# Patient Record
Sex: MALE | Race: BLACK OR AFRICAN AMERICAN | NOT HISPANIC OR LATINO | Employment: FULL TIME | URBAN - METROPOLITAN AREA
[De-identification: names, ages, dates, MRNs, and addresses within clinical notes are randomized per-mention and may not be internally consistent; named-entity substitution may affect disease eponyms.]

---

## 2019-12-30 ENCOUNTER — LAB REQUISITION (OUTPATIENT)
Dept: LAB | Facility: OTHER | Age: 39
End: 2019-12-30

## 2019-12-30 DIAGNOSIS — Z11.1 TUBERCULOSIS SCREENING: ICD-10-CM

## 2019-12-30 PROCEDURE — 86481 TB AG RESPONSE T-CELL SUSP: CPT | Performed by: EMERGENCY MEDICINE

## 2020-01-01 LAB
TSPOT INTERPRETATION: NEGATIVE
TSPOT NIL CONTROL INTERPRETATION: NORMAL
TSPOT PANEL A: 0
TSPOT PANEL B: 0
TSPOT POS CONTROL INTERPRETATION: NORMAL

## 2021-12-07 ENCOUNTER — APPOINTMENT (OUTPATIENT)
Dept: ULTRASOUND IMAGING | Facility: HOSPITAL | Age: 41
End: 2021-12-07

## 2021-12-07 ENCOUNTER — APPOINTMENT (OUTPATIENT)
Dept: GENERAL RADIOLOGY | Facility: HOSPITAL | Age: 41
End: 2021-12-07

## 2021-12-07 ENCOUNTER — HOSPITAL ENCOUNTER (INPATIENT)
Facility: HOSPITAL | Age: 41
LOS: 6 days | Discharge: HOME | End: 2021-12-13
Attending: EMERGENCY MEDICINE | Admitting: HOSPITALIST

## 2021-12-07 DIAGNOSIS — R74.8 ELEVATED LIVER ENZYMES: ICD-10-CM

## 2021-12-07 DIAGNOSIS — E66.2 OBESITY HYPOVENTILATION SYNDROME (HCC): ICD-10-CM

## 2021-12-07 DIAGNOSIS — R29.818 SUSPECTED SLEEP APNEA: ICD-10-CM

## 2021-12-07 DIAGNOSIS — R33.9 URINARY RETENTION: ICD-10-CM

## 2021-12-07 DIAGNOSIS — R60.1 ANASARCA: Primary | ICD-10-CM

## 2021-12-07 PROBLEM — E66.9 OBESITY (BMI 30-39.9): Status: ACTIVE | Noted: 2021-12-07

## 2021-12-07 PROBLEM — I16.0 HYPERTENSIVE URGENCY: Status: ACTIVE | Noted: 2021-12-07

## 2021-12-07 PROBLEM — F17.200 TOBACCO USE DISORDER: Status: ACTIVE | Noted: 2021-12-07

## 2021-12-07 PROBLEM — R33.8 ACUTE URINARY RETENTION: Status: ACTIVE | Noted: 2021-12-07

## 2021-12-07 PROBLEM — R03.0 ELEVATED BLOOD PRESSURE READING: Status: ACTIVE | Noted: 2021-12-07

## 2021-12-07 LAB
ALBUMIN SERPL-MCNC: 3.7 G/DL (ref 3.5–5.2)
ALBUMIN/GLOB SERPL: 0.8 G/DL
ALP SERPL-CCNC: 91 U/L (ref 39–117)
ALT SERPL W P-5'-P-CCNC: 60 U/L (ref 1–41)
ANION GAP SERPL CALCULATED.3IONS-SCNC: 9.3 MMOL/L (ref 5–15)
AST SERPL-CCNC: 60 U/L (ref 1–40)
BACTERIA UR QL AUTO: ABNORMAL /HPF
BASOPHILS # BLD AUTO: 0.04 10*3/MM3 (ref 0–0.2)
BASOPHILS NFR BLD AUTO: 0.6 % (ref 0–1.5)
BILIRUB SERPL-MCNC: 0.6 MG/DL (ref 0–1.2)
BILIRUB UR QL STRIP: NEGATIVE
BUN SERPL-MCNC: 14 MG/DL (ref 6–20)
BUN/CREAT SERPL: 13.9 (ref 7–25)
CALCIUM SPEC-SCNC: 9.1 MG/DL (ref 8.6–10.5)
CHLORIDE SERPL-SCNC: 98 MMOL/L (ref 98–107)
CLARITY UR: CLEAR
CO2 SERPL-SCNC: 30.7 MMOL/L (ref 22–29)
COLOR UR: YELLOW
CREAT SERPL-MCNC: 1.01 MG/DL (ref 0.76–1.27)
DEPRECATED RDW RBC AUTO: 46.7 FL (ref 37–54)
EOSINOPHIL # BLD AUTO: 0.11 10*3/MM3 (ref 0–0.4)
EOSINOPHIL NFR BLD AUTO: 1.7 % (ref 0.3–6.2)
ERYTHROCYTE [DISTWIDTH] IN BLOOD BY AUTOMATED COUNT: 17.7 % (ref 12.3–15.4)
GFR SERPL CREATININE-BSD FRML MDRD: 99 ML/MIN/1.73
GLOBULIN UR ELPH-MCNC: 4.9 GM/DL
GLUCOSE SERPL-MCNC: 86 MG/DL (ref 65–99)
GLUCOSE UR STRIP-MCNC: NEGATIVE MG/DL
HCT VFR BLD AUTO: 49.3 % (ref 37.5–51)
HGB BLD-MCNC: 14.9 G/DL (ref 13–17.7)
HGB UR QL STRIP.AUTO: NEGATIVE
HOLD SPECIMEN: NORMAL
HOLD SPECIMEN: NORMAL
HYALINE CASTS UR QL AUTO: ABNORMAL /LPF
IMM GRANULOCYTES # BLD AUTO: 0.01 10*3/MM3 (ref 0–0.05)
IMM GRANULOCYTES NFR BLD AUTO: 0.2 % (ref 0–0.5)
KETONES UR QL STRIP: NEGATIVE
LEUKOCYTE ESTERASE UR QL STRIP.AUTO: ABNORMAL
LYMPHOCYTES # BLD AUTO: 1.23 10*3/MM3 (ref 0.7–3.1)
LYMPHOCYTES NFR BLD AUTO: 19.1 % (ref 19.6–45.3)
MAGNESIUM SERPL-MCNC: 2.2 MG/DL (ref 1.6–2.6)
MCH RBC QN AUTO: 24.3 PG (ref 26.6–33)
MCHC RBC AUTO-ENTMCNC: 30.2 G/DL (ref 31.5–35.7)
MCV RBC AUTO: 80.3 FL (ref 79–97)
MONOCYTES # BLD AUTO: 0.57 10*3/MM3 (ref 0.1–0.9)
MONOCYTES NFR BLD AUTO: 8.8 % (ref 5–12)
NEUTROPHILS NFR BLD AUTO: 4.49 10*3/MM3 (ref 1.7–7)
NEUTROPHILS NFR BLD AUTO: 69.6 % (ref 42.7–76)
NITRITE UR QL STRIP: NEGATIVE
NRBC BLD AUTO-RTO: 0.8 /100 WBC (ref 0–0.2)
NT-PROBNP SERPL-MCNC: 1374 PG/ML (ref 0–450)
PH UR STRIP.AUTO: 5.5 [PH] (ref 5–8)
PLATELET # BLD AUTO: 281 10*3/MM3 (ref 140–450)
PMV BLD AUTO: 9.8 FL (ref 6–12)
POTASSIUM SERPL-SCNC: 4.6 MMOL/L (ref 3.5–5.2)
PROT SERPL-MCNC: 8.6 G/DL (ref 6–8.5)
PROT UR QL STRIP: ABNORMAL
QT INTERVAL: 353 MS
RBC # BLD AUTO: 6.14 10*6/MM3 (ref 4.14–5.8)
RBC # UR STRIP: ABNORMAL /HPF
REF LAB TEST METHOD: ABNORMAL
SARS-COV-2 ORF1AB RESP QL NAA+PROBE: NOT DETECTED
SODIUM SERPL-SCNC: 138 MMOL/L (ref 136–145)
SP GR UR STRIP: 1.02 (ref 1–1.03)
SQUAMOUS #/AREA URNS HPF: ABNORMAL /HPF
TROPONIN T SERPL-MCNC: 0.01 NG/ML (ref 0–0.03)
UROBILINOGEN UR QL STRIP: ABNORMAL
WBC # UR STRIP: ABNORMAL /HPF
WBC NRBC COR # BLD: 6.45 10*3/MM3 (ref 3.4–10.8)
WHOLE BLOOD HOLD SPECIMEN: NORMAL
WHOLE BLOOD HOLD SPECIMEN: NORMAL

## 2021-12-07 PROCEDURE — 80074 ACUTE HEPATITIS PANEL: CPT | Performed by: STUDENT IN AN ORGANIZED HEALTH CARE EDUCATION/TRAINING PROGRAM

## 2021-12-07 PROCEDURE — 83036 HEMOGLOBIN GLYCOSYLATED A1C: CPT | Performed by: STUDENT IN AN ORGANIZED HEALTH CARE EDUCATION/TRAINING PROGRAM

## 2021-12-07 PROCEDURE — P9612 CATHETERIZE FOR URINE SPEC: HCPCS

## 2021-12-07 PROCEDURE — 80053 COMPREHEN METABOLIC PANEL: CPT | Performed by: EMERGENCY MEDICINE

## 2021-12-07 PROCEDURE — 84484 ASSAY OF TROPONIN QUANT: CPT | Performed by: NURSE PRACTITIONER

## 2021-12-07 PROCEDURE — 25010000002 FUROSEMIDE PER 20 MG: Performed by: NURSE PRACTITIONER

## 2021-12-07 PROCEDURE — 83735 ASSAY OF MAGNESIUM: CPT | Performed by: NURSE PRACTITIONER

## 2021-12-07 PROCEDURE — 84443 ASSAY THYROID STIM HORMONE: CPT | Performed by: STUDENT IN AN ORGANIZED HEALTH CARE EDUCATION/TRAINING PROGRAM

## 2021-12-07 PROCEDURE — U0004 COV-19 TEST NON-CDC HGH THRU: HCPCS | Performed by: NURSE PRACTITIONER

## 2021-12-07 PROCEDURE — 83880 ASSAY OF NATRIURETIC PEPTIDE: CPT | Performed by: NURSE PRACTITIONER

## 2021-12-07 PROCEDURE — 93010 ELECTROCARDIOGRAM REPORT: CPT | Performed by: INTERNAL MEDICINE

## 2021-12-07 PROCEDURE — 85025 COMPLETE CBC W/AUTO DIFF WBC: CPT | Performed by: EMERGENCY MEDICINE

## 2021-12-07 PROCEDURE — 81001 URINALYSIS AUTO W/SCOPE: CPT | Performed by: NURSE PRACTITIONER

## 2021-12-07 PROCEDURE — 51798 US URINE CAPACITY MEASURE: CPT

## 2021-12-07 PROCEDURE — 93005 ELECTROCARDIOGRAM TRACING: CPT | Performed by: NURSE PRACTITIONER

## 2021-12-07 PROCEDURE — 71045 X-RAY EXAM CHEST 1 VIEW: CPT

## 2021-12-07 PROCEDURE — 80061 LIPID PANEL: CPT | Performed by: STUDENT IN AN ORGANIZED HEALTH CARE EDUCATION/TRAINING PROGRAM

## 2021-12-07 PROCEDURE — 99285 EMERGENCY DEPT VISIT HI MDM: CPT

## 2021-12-07 RX ORDER — ACETAMINOPHEN 325 MG/1
650 TABLET ORAL EVERY 4 HOURS PRN
Status: DISCONTINUED | OUTPATIENT
Start: 2021-12-07 | End: 2021-12-13 | Stop reason: HOSPADM

## 2021-12-07 RX ORDER — SODIUM CHLORIDE 0.9 % (FLUSH) 0.9 %
10 SYRINGE (ML) INJECTION EVERY 12 HOURS SCHEDULED
Status: DISCONTINUED | OUTPATIENT
Start: 2021-12-07 | End: 2021-12-08

## 2021-12-07 RX ORDER — ONDANSETRON 2 MG/ML
4 INJECTION INTRAMUSCULAR; INTRAVENOUS EVERY 6 HOURS PRN
Status: DISCONTINUED | OUTPATIENT
Start: 2021-12-07 | End: 2021-12-13 | Stop reason: HOSPADM

## 2021-12-07 RX ORDER — CHOLECALCIFEROL (VITAMIN D3) 125 MCG
5 CAPSULE ORAL NIGHTLY PRN
Status: DISCONTINUED | OUTPATIENT
Start: 2021-12-07 | End: 2021-12-13 | Stop reason: HOSPADM

## 2021-12-07 RX ORDER — SODIUM CHLORIDE 0.9 % (FLUSH) 0.9 %
10 SYRINGE (ML) INJECTION AS NEEDED
Status: DISCONTINUED | OUTPATIENT
Start: 2021-12-07 | End: 2021-12-08

## 2021-12-07 RX ORDER — NITROGLYCERIN 0.4 MG/1
0.4 TABLET SUBLINGUAL
Status: DISCONTINUED | OUTPATIENT
Start: 2021-12-07 | End: 2021-12-13 | Stop reason: HOSPADM

## 2021-12-07 RX ORDER — ACETAMINOPHEN 650 MG/1
650 SUPPOSITORY RECTAL EVERY 4 HOURS PRN
Status: DISCONTINUED | OUTPATIENT
Start: 2021-12-07 | End: 2021-12-10

## 2021-12-07 RX ORDER — SODIUM CHLORIDE 0.9 % (FLUSH) 0.9 %
10 SYRINGE (ML) INJECTION AS NEEDED
Status: DISCONTINUED | OUTPATIENT
Start: 2021-12-07 | End: 2021-12-10

## 2021-12-07 RX ORDER — ACETAMINOPHEN 160 MG/5ML
650 SOLUTION ORAL EVERY 4 HOURS PRN
Status: DISCONTINUED | OUTPATIENT
Start: 2021-12-07 | End: 2021-12-10

## 2021-12-07 RX ORDER — FUROSEMIDE 10 MG/ML
80 INJECTION INTRAMUSCULAR; INTRAVENOUS ONCE
Status: COMPLETED | OUTPATIENT
Start: 2021-12-07 | End: 2021-12-07

## 2021-12-07 RX ADMIN — FUROSEMIDE 80 MG: 10 INJECTION, SOLUTION INTRAMUSCULAR; INTRAVENOUS at 20:31

## 2021-12-07 NOTE — ED NOTES
Pt presents to ED with complaints of groin swelling x2 days, and decreased urination. Pt went to [facility] and they told him to come to the ER for additional scans. Pt is A&Ox4, able to ambulate, and in a mask at this time.     Patient was placed in face mask during first look triage.  Patient was wearing a face mask throughout encounter.  I wore personal protective equipment throughout the encounter.  Hand hygiene was performed before and after patient encounter.       Randi Díaz, RN  12/07/21 0666

## 2021-12-07 NOTE — ED PROVIDER NOTES
EMERGENCY DEPARTMENT ENCOUNTER    Room Number:  07/07  Date of encounter:  12/7/2021  PCP: Provider, No Known  Historian: Patient      PPE    Patient was placed in face mask in first look. Patient was wearing facemask when I entered the room and throughout our encounter. I wore full protective equipment throughout this patient encounter including a CAPR face mask, and gloves. Hand hygiene was performed before donning protective equipment and after removal when leaving the room.        HPI:  Chief Complaint: Edema  A complete HPI/ROS/PMH/PSH/SH/FH are unobtainable due to: Nothing    Context: Richard Rodrigez is a 41 y.o. male who arrives to the ED via private vehicle.  Patient presents with c/o moderate, constant, swelling to his abdomen, bilateral groin, penis and bilateral lower extremities for the past 2 days.   Patient also complains of decreased urine output also for the past 2 days.  Patient states that it is difficult for him to urinate because he is so swollen.  Patient denies fever, chills, chest pain, shortness of breath, nausea, vomiting, dizziness or any other symptoms.  Patient states that nothing makes the symptoms better and nothing worsens symptoms.  Patient states that he has no past medical history and takes no medications.  He states he did get his first COVID-19 vaccine 2 to 3 days ago.  He states he does drink at least 3 times a week.        PAST MEDICAL HISTORY  Active Ambulatory Problems     Diagnosis Date Noted   • No Active Ambulatory Problems     Resolved Ambulatory Problems     Diagnosis Date Noted   • No Resolved Ambulatory Problems     No Additional Past Medical History         PAST SURGICAL HISTORY  History reviewed. No pertinent surgical history.      FAMILY HISTORY  History reviewed. No pertinent family history.      SOCIAL HISTORY  Social History     Socioeconomic History   • Marital status:    Tobacco Use   • Smoking status: Current Every Day Smoker   Substance and Sexual  Activity   • Alcohol use: Yes     Alcohol/week: 2.0 standard drinks     Types: 2 Shots of liquor per week   • Drug use: Never   • Sexual activity: Defer         ALLERGIES  Patient has no known allergies.        REVIEW OF SYSTEMS  Review of Systems     All systems reviewed and negative except for those discussed in HPI.        PHYSICAL EXAM    ED Triage Vitals [12/07/21 1520]   Temp Heart Rate Resp BP SpO2   97.8 °F (36.6 °C) 117 18 -- 96 %      Temp src Heart Rate Source Patient Position BP Location FiO2 (%)   Tympanic Monitor -- -- --       Physical Exam  GENERAL: Patient appears older than stated age, nontoxic appearing, not distressed  HENT: normocephalic, atraumatic  EYES: no scleral icterus, PERRL  CV: regular rhythm, tachycardic, no murmur  RESPIRATORY: normal effort, diminished  ABDOMEN: Large, swollen abdomen with erythema, no tenderness to palpation, normal bowel sounds  Patient has moderate swelling to bilateral groin, penis and scrotum..  Cathy RN was at bedside during exam.  MUSCULOSKELETAL:Patient has 2+ pitting edema to bilateral lower extremities  NEURO: alert, moves all extremities, follows commands, mental status normal/baseline  SKIN: warm, dry, no rash   Psych: Appropriate mood and affect  Nursing notes and vital signs reviewed      LAB RESULTS  Recent Results (from the past 24 hour(s))   Comprehensive Metabolic Panel    Collection Time: 12/07/21  5:49 PM    Specimen: Blood   Result Value Ref Range    Glucose 86 65 - 99 mg/dL    BUN 14 6 - 20 mg/dL    Creatinine 1.01 0.76 - 1.27 mg/dL    Sodium 138 136 - 145 mmol/L    Potassium 4.6 3.5 - 5.2 mmol/L    Chloride 98 98 - 107 mmol/L    CO2 30.7 (H) 22.0 - 29.0 mmol/L    Calcium 9.1 8.6 - 10.5 mg/dL    Total Protein 8.6 (H) 6.0 - 8.5 g/dL    Albumin 3.70 3.50 - 5.20 g/dL    ALT (SGPT) 60 (H) 1 - 41 U/L    AST (SGOT) 60 (H) 1 - 40 U/L    Alkaline Phosphatase 91 39 - 117 U/L    Total Bilirubin 0.6 0.0 - 1.2 mg/dL    eGFR  African Amer 99 >60 mL/min/1.73     Globulin 4.9 gm/dL    A/G Ratio 0.8 g/dL    BUN/Creatinine Ratio 13.9 7.0 - 25.0    Anion Gap 9.3 5.0 - 15.0 mmol/L   CBC Auto Differential    Collection Time: 12/07/21  5:49 PM    Specimen: Blood   Result Value Ref Range    WBC 6.45 3.40 - 10.80 10*3/mm3    RBC 6.14 (H) 4.14 - 5.80 10*6/mm3    Hemoglobin 14.9 13.0 - 17.7 g/dL    Hematocrit 49.3 37.5 - 51.0 %    MCV 80.3 79.0 - 97.0 fL    MCH 24.3 (L) 26.6 - 33.0 pg    MCHC 30.2 (L) 31.5 - 35.7 g/dL    RDW 17.7 (H) 12.3 - 15.4 %    RDW-SD 46.7 37.0 - 54.0 fl    MPV 9.8 6.0 - 12.0 fL    Platelets 281 140 - 450 10*3/mm3    Neutrophil % 69.6 42.7 - 76.0 %    Lymphocyte % 19.1 (L) 19.6 - 45.3 %    Monocyte % 8.8 5.0 - 12.0 %    Eosinophil % 1.7 0.3 - 6.2 %    Basophil % 0.6 0.0 - 1.5 %    Immature Grans % 0.2 0.0 - 0.5 %    Neutrophils, Absolute 4.49 1.70 - 7.00 10*3/mm3    Lymphocytes, Absolute 1.23 0.70 - 3.10 10*3/mm3    Monocytes, Absolute 0.57 0.10 - 0.90 10*3/mm3    Eosinophils, Absolute 0.11 0.00 - 0.40 10*3/mm3    Basophils, Absolute 0.04 0.00 - 0.20 10*3/mm3    Immature Grans, Absolute 0.01 0.00 - 0.05 10*3/mm3    nRBC 0.8 (H) 0.0 - 0.2 /100 WBC   Green Top (Gel)    Collection Time: 12/07/21  5:49 PM   Result Value Ref Range    Extra Tube Hold for add-ons.    Lavender Top    Collection Time: 12/07/21  5:49 PM   Result Value Ref Range    Extra Tube hold for add-on    Gold Top - SST    Collection Time: 12/07/21  5:49 PM   Result Value Ref Range    Extra Tube Hold for add-ons.    Light Blue Top    Collection Time: 12/07/21  5:49 PM   Result Value Ref Range    Extra Tube hold for add-on    BNP    Collection Time: 12/07/21  5:49 PM    Specimen: Blood   Result Value Ref Range    proBNP 1,374.0 (H) 0.0 - 450.0 pg/mL   Troponin    Collection Time: 12/07/21  5:49 PM    Specimen: Blood   Result Value Ref Range    Troponin T 0.011 0.000 - 0.030 ng/mL   Magnesium    Collection Time: 12/07/21  5:49 PM    Specimen: Blood   Result Value Ref Range    Magnesium 2.2 1.6 - 2.6  mg/dL   Urinalysis With Microscopic If Indicated (No Culture) - Urine, Catheter    Collection Time: 12/07/21  5:51 PM    Specimen: Urine, Catheter   Result Value Ref Range    Color, UA Yellow Yellow, Straw    Appearance, UA Clear Clear    pH, UA 5.5 5.0 - 8.0    Specific Gravity, UA 1.016 1.005 - 1.030    Glucose, UA Negative Negative    Ketones, UA Negative Negative    Bilirubin, UA Negative Negative    Blood, UA Negative Negative    Protein, UA Trace (A) Negative    Leuk Esterase, UA Small (1+) (A) Negative    Nitrite, UA Negative Negative    Urobilinogen, UA 1.0 E.U./dL 0.2 - 1.0 E.U./dL   Urinalysis, Microscopic Only - Urine, Catheter    Collection Time: 12/07/21  5:51 PM    Specimen: Urine, Catheter   Result Value Ref Range    RBC, UA 0-2 None Seen, 0-2 /HPF    WBC, UA 6-12 (A) None Seen, 0-2 /HPF    Bacteria, UA None Seen None Seen /HPF    Squamous Epithelial Cells, UA 0-2 None Seen, 0-2 /HPF    Hyaline Casts, UA 0-2 None Seen /LPF    Methodology Automated Microscopy    ECG 12 Lead    Collection Time: 12/07/21  5:54 PM   Result Value Ref Range    QT Interval 353 ms       Ordered the above labs and independently reviewed the results.      RADIOLOGY  XR Chest 1 View    Result Date: 12/7/2021  AP CHEST  HISTORY: Swelling to abdomen and lower extremity  COMPARISON: None  FINDINGS: Lung fields appear clear. Heart size probably normal for technique. No appreciable pneumothorax. No acute bony abnormality.      No acute findings  This report was finalized on 12/7/2021 6:41 PM by Dr. Bryan Houston M.D.        I ordered the above noted radiological studies and viewed the images on the PACS system.       EKG      Independently viewed by me and interpreted by Dr Flores         MEDICAL RECORD REVIEW  Medical records reviewed in [facility], patient was sent here urgent care center today.  Otherwise no relevant medical records to review in epic      PROCEDURES    Procedures        DIFFERENTIAL DIAGNOSIS  Differential diagnosis  include but are not limited to the following: CHF, anasarca, electrolyte abnormality, DIEGO,      PROGRESS, DATA ANALYSIS, CONSULTS, AND MEDICAL DECISION MAKING        ED Course as of 12/07/21 2051   Tue Dec 07, 2021   1742 Discussed with patient that we will obtain labs, chest x-ray and EKG to evaluate for causes of his diffuse swelling.  Discussed with patient that he is going to require admission to the hospital.  Patient verbalized understanding and is agreeable to this plan. [MS]   1746 Bladder scan showed 469 cc.  Order placed for brown catheter. [MS]   1746 RN placed brown catheter with return of 800 cc of urine. [MS]   1832 BP(!): 187/117 [MS]   2000 proBNP(!): 1,374.0 [MS]   2016 Consult Note    Discussed care with Dr Ramos  Reviewed patient's history, exam, results and need for admission secondary to anasarca and urinary retention  Dr. Ramos accepts the patient to be admitted to telemetry inpatient bed.     [MS]      ED Course User Index  [MS] Charo Medley APRN     ADMISSION    Discussed treatment plan and reason for admission with pt/family and admitting physician.  Pt/family voiced understanding of the plan for admission for further testing/treatment as needed.      DIAGNOSIS  Final diagnoses:   Anasarca   Urinary retention   Elevated liver enzymes           MEDICATIONS GIVEN IN ED    Medications   sodium chloride 0.9 % flush 10 mL (has no administration in time range)   furosemide (LASIX) injection 80 mg (80 mg Intravenous Given 12/7/21 2031)           COURSE & MEDICAL DECISION MAKING  Any/All labs and Any/All Imaging studies that were ordered were reviewed and are noted above.  Results were reviewed/discussed with the patient and they were also made aware of online access.    Pt also made aware that some labs, such as cultures, will not be resulted during ER visit and followup with PMD is necessary.        Charo Medley APRN  12/07/21 2051

## 2021-12-07 NOTE — ED PROVIDER NOTES
I have supervised the care provided by the midlevel provider.    We have discussed this patient's history, physical exam, and treatment plan.   I have reviewed the note and have personally examined the patient and agree with the plan of care.  See attached attending note.  My personal findings are below:    Patient complains of swelling of the legs, groin, scrotum, and abdomen for the past several days.  Also reports decreased urine output and difficulty urinating.  Denies fever, chills, chest pain, shortness of breath, vomiting, or diarrhea.  Patient denies any significant past medical history but has not seen a primary care doctor many years.    On exam: Awake and alert.  Heart is regular rate and rhythm.  Lungs are clear bilaterally.  Respiratory effort is normal.  Abdomen is obese, distended, and nontender.  There is edema of the scrotum and penis.  There is 2+ pitting edema in both lower extremities.    Patient was found to have urinary retention and a Tilley catheter was placed with return of 800 mL of urine.  Labs and chest x-ray are pending.      EKG          EKG time: 1754  Rhythm/Rate: Sinus tachycardia, rate 101  P waves and OH: Normal  QRS, axis: RAD  ST and T waves: Normal    Interpreted Contemporaneously by me, independently viewed  No prior available for comparison       César Flores MD  12/07/21 0188

## 2021-12-07 NOTE — ED NOTES
Attempt IV x2 no success, call to Macho Leija RN to come place iv with ultrasound machine     Alejandra Feliciano RN  12/07/21 8819

## 2021-12-08 ENCOUNTER — APPOINTMENT (OUTPATIENT)
Dept: ULTRASOUND IMAGING | Facility: HOSPITAL | Age: 41
End: 2021-12-08

## 2021-12-08 PROBLEM — R79.89 ELEVATED LFTS: Status: ACTIVE | Noted: 2021-12-08

## 2021-12-08 LAB
ANION GAP SERPL CALCULATED.3IONS-SCNC: 3.9 MMOL/L (ref 5–15)
BASOPHILS # BLD AUTO: 0.03 10*3/MM3 (ref 0–0.2)
BASOPHILS NFR BLD AUTO: 0.5 % (ref 0–1.5)
BUN SERPL-MCNC: 14 MG/DL (ref 6–20)
BUN/CREAT SERPL: 14.7 (ref 7–25)
CALCIUM SPEC-SCNC: 9.3 MG/DL (ref 8.6–10.5)
CHLORIDE SERPL-SCNC: 98 MMOL/L (ref 98–107)
CHOLEST SERPL-MCNC: 133 MG/DL (ref 0–200)
CO2 SERPL-SCNC: 34.1 MMOL/L (ref 22–29)
CREAT SERPL-MCNC: 0.95 MG/DL (ref 0.76–1.27)
CREAT UR-MCNC: 11.8 MG/DL
DEPRECATED RDW RBC AUTO: 48.2 FL (ref 37–54)
EOSINOPHIL # BLD AUTO: 0.06 10*3/MM3 (ref 0–0.4)
EOSINOPHIL NFR BLD AUTO: 0.9 % (ref 0.3–6.2)
ERYTHROCYTE [DISTWIDTH] IN BLOOD BY AUTOMATED COUNT: 18 % (ref 12.3–15.4)
GFR SERPL CREATININE-BSD FRML MDRD: 106 ML/MIN/1.73
GLUCOSE SERPL-MCNC: 148 MG/DL (ref 65–99)
HAV IGM SERPL QL IA: NORMAL
HAV IGM SERPL QL IA: NORMAL
HBA1C MFR BLD: 6.4 % (ref 4.8–5.6)
HBV CORE IGM SERPL QL IA: NORMAL
HBV CORE IGM SERPL QL IA: NORMAL
HBV SURFACE AG SERPL QL IA: NORMAL
HBV SURFACE AG SERPL QL IA: NORMAL
HCT VFR BLD AUTO: 50 % (ref 37.5–51)
HCV AB SER DONR QL: NORMAL
HCV AB SER DONR QL: NORMAL
HDLC SERPL-MCNC: 40 MG/DL (ref 40–60)
HGB BLD-MCNC: 14.7 G/DL (ref 13–17.7)
HIV1+2 AB SER QL: NORMAL
IMM GRANULOCYTES # BLD AUTO: 0.02 10*3/MM3 (ref 0–0.05)
IMM GRANULOCYTES NFR BLD AUTO: 0.3 % (ref 0–0.5)
LDLC SERPL CALC-MCNC: 77 MG/DL (ref 0–100)
LDLC/HDLC SERPL: 1.92 {RATIO}
LYMPHOCYTES # BLD AUTO: 0.71 10*3/MM3 (ref 0.7–3.1)
LYMPHOCYTES NFR BLD AUTO: 10.9 % (ref 19.6–45.3)
MAGNESIUM SERPL-MCNC: 2.2 MG/DL (ref 1.6–2.6)
MCH RBC QN AUTO: 24.1 PG (ref 26.6–33)
MCHC RBC AUTO-ENTMCNC: 29.4 G/DL (ref 31.5–35.7)
MCV RBC AUTO: 81.8 FL (ref 79–97)
MONOCYTES # BLD AUTO: 0.45 10*3/MM3 (ref 0.1–0.9)
MONOCYTES NFR BLD AUTO: 6.9 % (ref 5–12)
NEUTROPHILS NFR BLD AUTO: 5.27 10*3/MM3 (ref 1.7–7)
NEUTROPHILS NFR BLD AUTO: 80.5 % (ref 42.7–76)
NRBC BLD AUTO-RTO: 0.5 /100 WBC (ref 0–0.2)
PHOSPHATE SERPL-MCNC: 5.3 MG/DL (ref 2.5–4.5)
PLATELET # BLD AUTO: 292 10*3/MM3 (ref 140–450)
PMV BLD AUTO: 10.6 FL (ref 6–12)
POTASSIUM SERPL-SCNC: 4.6 MMOL/L (ref 3.5–5.2)
PROT ?TM UR-MCNC: <4 MG/DL
PROT/CREAT UR: NORMAL MG/G{CREAT}
RBC # BLD AUTO: 6.11 10*6/MM3 (ref 4.14–5.8)
SODIUM SERPL-SCNC: 136 MMOL/L (ref 136–145)
TRIGL SERPL-MCNC: 81 MG/DL (ref 0–150)
TSH SERPL DL<=0.05 MIU/L-ACNC: 3.04 UIU/ML (ref 0.27–4.2)
VLDLC SERPL-MCNC: 16 MG/DL (ref 5–40)
WBC NRBC COR # BLD: 6.54 10*3/MM3 (ref 3.4–10.8)

## 2021-12-08 PROCEDURE — 83735 ASSAY OF MAGNESIUM: CPT | Performed by: STUDENT IN AN ORGANIZED HEALTH CARE EDUCATION/TRAINING PROGRAM

## 2021-12-08 PROCEDURE — 84156 ASSAY OF PROTEIN URINE: CPT | Performed by: STUDENT IN AN ORGANIZED HEALTH CARE EDUCATION/TRAINING PROGRAM

## 2021-12-08 PROCEDURE — 80074 ACUTE HEPATITIS PANEL: CPT | Performed by: INTERNAL MEDICINE

## 2021-12-08 PROCEDURE — 25010000002 FUROSEMIDE PER 20 MG: Performed by: INTERNAL MEDICINE

## 2021-12-08 PROCEDURE — 82105 ALPHA-FETOPROTEIN SERUM: CPT | Performed by: HOSPITALIST

## 2021-12-08 PROCEDURE — 84100 ASSAY OF PHOSPHORUS: CPT | Performed by: STUDENT IN AN ORGANIZED HEALTH CARE EDUCATION/TRAINING PROGRAM

## 2021-12-08 PROCEDURE — 85025 COMPLETE CBC W/AUTO DIFF WBC: CPT | Performed by: STUDENT IN AN ORGANIZED HEALTH CARE EDUCATION/TRAINING PROGRAM

## 2021-12-08 PROCEDURE — 80048 BASIC METABOLIC PNL TOTAL CA: CPT | Performed by: STUDENT IN AN ORGANIZED HEALTH CARE EDUCATION/TRAINING PROGRAM

## 2021-12-08 PROCEDURE — 82378 CARCINOEMBRYONIC ANTIGEN: CPT | Performed by: HOSPITALIST

## 2021-12-08 PROCEDURE — 82570 ASSAY OF URINE CREATININE: CPT | Performed by: STUDENT IN AN ORGANIZED HEALTH CARE EDUCATION/TRAINING PROGRAM

## 2021-12-08 PROCEDURE — G0432 EIA HIV-1/HIV-2 SCREEN: HCPCS | Performed by: STUDENT IN AN ORGANIZED HEALTH CARE EDUCATION/TRAINING PROGRAM

## 2021-12-08 PROCEDURE — 25010000002 FUROSEMIDE PER 20 MG: Performed by: STUDENT IN AN ORGANIZED HEALTH CARE EDUCATION/TRAINING PROGRAM

## 2021-12-08 RX ORDER — FUROSEMIDE 10 MG/ML
40 INJECTION INTRAMUSCULAR; INTRAVENOUS
Status: DISCONTINUED | OUTPATIENT
Start: 2021-12-08 | End: 2021-12-08

## 2021-12-08 RX ORDER — FUROSEMIDE 10 MG/ML
60 INJECTION INTRAMUSCULAR; INTRAVENOUS EVERY 8 HOURS
Status: DISCONTINUED | OUTPATIENT
Start: 2021-12-08 | End: 2021-12-11

## 2021-12-08 RX ADMIN — SODIUM CHLORIDE, PRESERVATIVE FREE 10 ML: 5 INJECTION INTRAVENOUS at 00:43

## 2021-12-08 RX ADMIN — FUROSEMIDE 60 MG: 10 INJECTION, SOLUTION INTRAMUSCULAR; INTRAVENOUS at 16:49

## 2021-12-08 RX ADMIN — ACETAMINOPHEN 650 MG: 325 TABLET, FILM COATED ORAL at 21:54

## 2021-12-08 RX ADMIN — SODIUM CHLORIDE, PRESERVATIVE FREE 10 ML: 5 INJECTION INTRAVENOUS at 10:02

## 2021-12-08 RX ADMIN — FUROSEMIDE 40 MG: 10 INJECTION, SOLUTION INTRAMUSCULAR; INTRAVENOUS at 10:01

## 2021-12-08 NOTE — PLAN OF CARE
Problem: Adult Inpatient Plan of Care  Goal: Plan of Care Review  Flowsheets (Taken 12/8/2021 5825)  Progress: improving  Plan of Care Reviewed With: patient  Outcome Summary: 3 + edema to body feet to mid abdomen having difficulty moving around diuresed a great deal with lasix f/c paent to bsd urine clear yellow oxygen on at 4 liters desats when asleep to the 80's much like sleep apnea   Goal Outcome Evaluation:  Plan of Care Reviewed With: patient        Progress: improving  Outcome Summary: 3 + edema to body feet to mid abdomen having difficulty moving around diuresed a great deal with lasix f/c paent to bsd urine clear yellow oxygen on at 4 liters desats when asleep to the 80's much like sleep apnea

## 2021-12-08 NOTE — ED NOTES
Pt in mask throughout encounter. This ERT was in appropriate ppe.       Tito Lee, PCT  12/07/21 1932

## 2021-12-08 NOTE — PROGRESS NOTES
Name: Richard Rodrigez ADMIT: 2021   : 1980  PCP: Provider, No Known    MRN: [id number] LOS: 1 days   AGE/SEX: 41 y.o. male  ROOM: [facility]/     Subjective   Subjective     Patient is lying on the bed and is in no major distress.  Denies nausea, vomiting, chest pain.  Does admit to shortness of breath.  Review of Systems     Objective   Objective   Vital Signs  Temp:  [97.1 °F (36.2 °C)-97.9 °F (36.6 °C)] 97.6 °F (36.4 °C)  Heart Rate:  [] 105  Resp:  [14-20] 20  BP: (139-187)/() 179/98  SpO2:  [90 %-100 %] 90 %  on  Flow (L/min):  [4-8] 4;   Device (Oxygen Therapy): nasal cannula  Body mass index is 50.86 kg/m².  Physical Exam  Constitutional:       General: He is not in acute distress.     Appearance: Normal appearance. He is obese.   HENT:      Head: Atraumatic.      Nose: Nose normal.      Mouth/Throat:      Mouth: Mucous membranes are moist.   Eyes:      Extraocular Movements: Extraocular movements intact.      Conjunctiva/sclera: Conjunctivae normal.      Pupils: Pupils are equal, round, and reactive to light.   Cardiovascular:      Rate and Rhythm: Normal rate and regular rhythm.      Pulses: Normal pulses.      Heart sounds: Normal heart sounds.   Pulmonary:      Effort: Pulmonary effort is normal. No respiratory distress.      Breath sounds: Normal breath sounds.   Abdominal:      General: There is distension.      Tenderness: There is no abdominal tenderness.      Comments: Positive for ascites   Musculoskeletal:         General: Swelling present. Normal range of motion.      Cervical back: Neck supple. No rigidity.   Skin:     General: Skin is warm and dry.      Coloration: Skin is not jaundiced.   Neurological:      General: No focal deficit present.      Mental Status: He is alert and oriented to person, place, and time.   Psychiatric:         Mood and Affect: Mood normal.         Behavior: Behavior normal.             Results Review     I reviewed the patient's new clinical  results.  Results from last 7 days   Lab Units 12/08/21  0828 12/07/21  1749   WBC 10*3/mm3 6.54 6.45   HEMOGLOBIN g/dL 14.7 14.9   PLATELETS 10*3/mm3 292 281     Results from last 7 days   Lab Units 12/08/21  0828 12/07/21  1749   SODIUM mmol/L 136 138   POTASSIUM mmol/L 4.6 4.6   CHLORIDE mmol/L 98 98   CO2 mmol/L 34.1* 30.7*   BUN mg/dL 14 14   CREATININE mg/dL 0.95 1.01   GLUCOSE mg/dL 148* 86   EGFR IF AFRICN AM mL/min/1.73 106 99     Results from last 7 days   Lab Units 12/07/21  1749   ALBUMIN g/dL 3.70   BILIRUBIN mg/dL 0.6   ALK PHOS U/L 91   AST (SGOT) U/L 60*   ALT (SGPT) U/L 60*     Results from last 7 days   Lab Units 12/08/21  0828 12/07/21  1749   CALCIUM mg/dL 9.3 9.1   ALBUMIN g/dL  --  3.70   MAGNESIUM mg/dL 2.2 2.2   PHOSPHORUS mg/dL 5.3*  --        COVID19   Date Value Ref Range Status   12/07/2021 Not Detected Not Detected - Ref. Range Final     Hemoglobin A1C   Date/Time Value Ref Range Status   12/07/2021 1749 6.40 (H) 4.80 - 5.60 % Final       XR Chest 1 View  Narrative: AP CHEST     HISTORY: Swelling to abdomen and lower extremity     COMPARISON: None     FINDINGS: Lung fields appear clear. Heart size probably normal for  technique. No appreciable pneumothorax. No acute bony abnormality.     Impression: No acute findings     This report was finalized on 12/7/2021 6:41 PM by Dr. Bryan Houston M.D.       Scheduled Medications  furosemide, 40 mg, Intravenous, BID  sodium chloride, 10 mL, Intravenous, Q12H    Infusions   Diet  Diet Regular; Low Sodium; 2,000 mg Na       Assessment/Plan     Active Hospital Problems    Diagnosis  POA   • **Anasarca [R60.1]  Yes   • Elevated LFTs [R79.89]  Yes   • Acute urinary retention [R33.8]  Yes   • Obesity (BMI 30-39.9) [E66.9]  Yes   • Tobacco use disorder [F17.200]  Yes   • Elevated blood pressure reading [R03.0]  Yes      Resolved Hospital Problems   No resolved problems to display.       41 y.o. male admitted with Anasarca.    1. Anasarca, patient will  be diuresed and liver ultrasound and 2D echo will also be obtained.  Cardiology consult also be obtained.  2.  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  He was on folate and thiamine supplements as well.  3.  Obesity, counseled to lose weight.  4.  Tobacco abuse, counseled to quit smoking.  5.  Borderline diabetes mellitus, blood sugars are reasonably well controlled at the moment.  6.  On Lovenox for DVT prophylaxis.  7.  CODE STATUS is full code.    Chacho Castro MD  [facility]  12/08/21  16:27 EST

## 2021-12-08 NOTE — H&P
"    Patient Name:  Richard Rodrigez  YOB: 1980  MRN:  [id number]  Admit Date:  12/7/2021  Patient Care Team:  Provider, No Known as PCP - General      Subjective   History Present Illness     Chief Complaint   Patient presents with   • Groin Swelling   • Urinary Retention       History of Present Illness   Mr. Rodrigez is a 41 y.o. male with no significant past medical history who presents with penile swelling and difficulty urinating for the last 2 days.  He also has significant pitting edema up to his flanks.  He is not sure when the lower extremity swelling started, \"maybe a few weeks\".  He denies orthopnea, shortness of breath, or cough.    Review of Systems   Constitutional: Negative for appetite change, chills, fever and unexpected weight change.   HENT: Negative for trouble swallowing.    Respiratory: Negative for cough and shortness of breath.    Cardiovascular: Positive for leg swelling. Negative for chest pain and palpitations.   Gastrointestinal: Positive for abdominal distention. Negative for abdominal pain, blood in stool, constipation, diarrhea, nausea and vomiting.   Endocrine: Negative for polydipsia and polyuria.   Genitourinary: Positive for difficulty urinating, penile swelling and scrotal swelling. Negative for dysuria, flank pain and hematuria.   Neurological: Negative for dizziness, syncope and headaches.        Personal History     History reviewed. No pertinent past medical history.  Past Surgical History:   Procedure Laterality Date   • FRACTURE SURGERY       History reviewed. No pertinent family history.  Social History     Tobacco Use   • Smoking status: Current Every Day Smoker     Types: Cigars   • Smokeless tobacco: Not on file   Substance Use Topics   • Alcohol use: Yes     Alcohol/week: 2.0 standard drinks     Types: 2 Shots of liquor per week   • Drug use: Never     No current facility-administered medications on file prior to encounter.     No current outpatient " medications on file prior to encounter.     No Known Allergies    Objective    Objective     Vital Signs  Temp:  [97.8 °F (36.6 °C)-97.9 °F (36.6 °C)] 97.9 °F (36.6 °C)  Heart Rate:  [] 94  Resp:  [14-18] 18  BP: (139-187)/() 139/84  SpO2:  [95 %-100 %] 95 %  on  Flow (L/min):  [4-8] 4;   Device (Oxygen Therapy): nasal cannula  There is no height or weight on file to calculate BMI.    Physical Exam  Constitutional:       General: He is not in acute distress.     Appearance: He is obese. He is not diaphoretic.   HENT:      Head: Normocephalic and atraumatic.      Mouth/Throat:      Mouth: Mucous membranes are moist.      Pharynx: No oropharyngeal exudate or posterior oropharyngeal erythema.   Eyes:      General: No scleral icterus.     Extraocular Movements: Extraocular movements intact.      Pupils: Pupils are equal, round, and reactive to light.   Cardiovascular:      Rate and Rhythm: Normal rate and regular rhythm.      Pulses: Normal pulses.      Heart sounds: No murmur heard.  No gallop.    Pulmonary:      Effort: Pulmonary effort is normal. No respiratory distress.      Breath sounds: No wheezing or rhonchi.   Abdominal:      General: There is no distension.      Palpations: Abdomen is soft.      Tenderness: There is no abdominal tenderness. There is no guarding.   Musculoskeletal:         General: No tenderness or deformity.      Right lower leg: Edema present.      Left lower leg: Edema present.      Comments: Dependent pitting edema up to his flanks   Skin:     General: Skin is warm and dry.      Capillary Refill: Capillary refill takes less than 2 seconds.      Findings: No lesion or rash.   Neurological:      Mental Status: He is alert and oriented to person, place, and time.   Psychiatric:         Mood and Affect: Mood normal.         Results Review:  I reviewed the patient's new clinical results.  I reviewed the patient's new imaging results and agree with the interpretation.  I reviewed the  patient's other test results and agree with the interpretation  I personally viewed and interpreted the patient's EKG/Telemetry data  Discussed with ED provider.    Lab Results (last 24 hours)     Procedure Component Value Units Date/Time    COVID PRE-OP / PRE-PROCEDURE SCREENING ORDER (NO ISOLATION) - Swab, Nasopharynx [[id number]]  (Normal) Collected: 12/07/21 1748    Specimen: Swab from Nasopharynx Updated: 12/07/21 2256    Narrative:      The following orders were created for panel order COVID PRE-OP / PRE-PROCEDURE SCREENING ORDER (NO ISOLATION) - Swab, Nasopharynx.  Procedure                               Abnormality         Status                     ---------                               -----------         ------                     COVID-19,APTIMA PANTHER(...[[id number]]  Normal              Final result                 Please view results for these tests on the individual orders.    COVID-19,APTIMA PANTHER(ROE),[facility], NP/OP SWAB IN UTM/VTM/SALINE TRANSPORT MEDIA,24 HR TAT - Swab, Nasopharynx [[id number]]  (Normal) Collected: 12/07/21 1748    Specimen: Swab from Nasopharynx Updated: 12/07/21 2256     COVID19 Not Detected    Narrative:      Fact sheet for providers: https://www.fda.gov/media/849576/download     Fact sheet for patients: https://www.fda.gov/media/794926/download    Test performed by RT PCR.    CBC & Differential [[id number]]  (Abnormal) Collected: 12/07/21 1749    Specimen: Blood Updated: 12/07/21 1838    Narrative:      The following orders were created for panel order CBC & Differential.  Procedure                               Abnormality         Status                     ---------                               -----------         ------                     CBC Auto Differential[[id number]]        Abnormal            Final result                 Please view results for these tests on the individual orders.    Comprehensive Metabolic Panel [[id number]]  (Abnormal) Collected: 12/07/21 1749     Specimen: Blood Updated: 12/07/21 1932     Glucose 86 mg/dL      BUN 14 mg/dL      Creatinine 1.01 mg/dL      Sodium 138 mmol/L      Potassium 4.6 mmol/L      Chloride 98 mmol/L      CO2 30.7 mmol/L      Calcium 9.1 mg/dL      Total Protein 8.6 g/dL      Albumin 3.70 g/dL      ALT (SGPT) 60 U/L      AST (SGOT) 60 U/L      Alkaline Phosphatase 91 U/L      Total Bilirubin 0.6 mg/dL      eGFR  African Amer 99 mL/min/1.73      Globulin 4.9 gm/dL      A/G Ratio 0.8 g/dL      BUN/Creatinine Ratio 13.9     Anion Gap 9.3 mmol/L     Narrative:      GFR Normal >60  Chronic Kidney Disease <60  Kidney Failure <15      CBC Auto Differential [[id number]]  (Abnormal) Collected: 12/07/21 1749    Specimen: Blood Updated: 12/07/21 1838     WBC 6.45 10*3/mm3      RBC 6.14 10*6/mm3      Hemoglobin 14.9 g/dL      Hematocrit 49.3 %      MCV 80.3 fL      MCH 24.3 pg      MCHC 30.2 g/dL      RDW 17.7 %      RDW-SD 46.7 fl      MPV 9.8 fL      Platelets 281 10*3/mm3      Neutrophil % 69.6 %      Lymphocyte % 19.1 %      Monocyte % 8.8 %      Eosinophil % 1.7 %      Basophil % 0.6 %      Immature Grans % 0.2 %      Neutrophils, Absolute 4.49 10*3/mm3      Lymphocytes, Absolute 1.23 10*3/mm3      Monocytes, Absolute 0.57 10*3/mm3      Eosinophils, Absolute 0.11 10*3/mm3      Basophils, Absolute 0.04 10*3/mm3      Immature Grans, Absolute 0.01 10*3/mm3      nRBC 0.8 /100 WBC     BNP [[id number]]  (Abnormal) Collected: 12/07/21 1749    Specimen: Blood Updated: 12/07/21 1947     proBNP 1,374.0 pg/mL     Narrative:      Among patients with dyspnea, NT-proBNP is highly sensitive for the detection of acute congestive heart failure. In addition NT-proBNP of <300 pg/ml effectively rules out acute congestive heart failure with 99% negative predictive value.    Results may be falsely decreased if patient taking Biotin.      Troponin [[id number]]  (Normal) Collected: 12/07/21 1749    Specimen: Blood Updated: 12/07/21 1947     Troponin T 0.011 ng/mL      Narrative:      Troponin T Reference Range:  <= 0.03 ng/mL-   Negative for AMI  >0.03 ng/mL-     Abnormal for myocardial necrosis.  Clinicians would have to utilize clinical acumen, EKG, Troponin and serial changes to determine if it is an Acute Myocardial Infarction or myocardial injury due to an underlying chronic condition.       Results may be falsely decreased if patient taking Biotin.      Magnesium [[id number]]  (Normal) Collected: 12/07/21 1749    Specimen: Blood Updated: 12/07/21 1932     Magnesium 2.2 mg/dL     Urinalysis With Microscopic If Indicated (No Culture) - Urine, Catheter [[id number]]  (Abnormal) Collected: 12/07/21 1751    Specimen: Urine, Catheter Updated: 12/07/21 1821     Color, UA Yellow     Appearance, UA Clear     pH, UA 5.5     Specific Gravity, UA 1.016     Glucose, UA Negative     Ketones, UA Negative     Bilirubin, UA Negative     Blood, UA Negative     Protein, UA Trace     Leuk Esterase, UA Small (1+)     Nitrite, UA Negative     Urobilinogen, UA 1.0 E.U./dL    Urinalysis, Microscopic Only - Urine, Catheter [[id number]]  (Abnormal) Collected: 12/07/21 1751    Specimen: Urine, Catheter Updated: 12/07/21 1821     RBC, UA 0-2 /HPF      WBC, UA 6-12 /HPF      Bacteria, UA None Seen /HPF      Squamous Epithelial Cells, UA 0-2 /HPF      Hyaline Casts, UA 0-2 /LPF      Methodology Automated Microscopy    Protein / Creatinine Ratio, Urine - Urine, Clean Catch [[id number]] Collected: 12/08/21 0043    Specimen: Urine, Clean Catch Updated: 12/08/21 0108          Imaging Results (Last 24 Hours)     Procedure Component Value Units Date/Time    XR Chest 1 View [[id number]] Collected: 12/07/21 1840     Updated: 12/07/21 1844    Narrative:      AP CHEST     HISTORY: Swelling to abdomen and lower extremity     COMPARISON: None     FINDINGS: Lung fields appear clear. Heart size probably normal for  technique. No appreciable pneumothorax. No acute bony abnormality.       Impression:      No acute findings      This report was finalized on 12/7/2021 6:41 PM by Dr. Bryan Houston M.D.                 ECG 12 Lead   Final Result   HEART RATE= 101  bpm   RR Interval= 594  ms   UT Interval= 196  ms   P Horizontal Axis= -7  deg   P Front Axis= 63  deg   QRSD Interval= 88  ms   QT Interval= 353  ms   QRS Axis= 104  deg   T Wave Axis= 56  deg   - OTHERWISE NORMAL ECG -   Sinus tachycardia   Right axis deviation   No Prior Tracing for Comparison   Electronically Signed By: Adrianna Sheikh ([facility]) 07-Dec-2021 18:25:56   Date and Time of Study: 2021-12-07 17:54:36      I personally reviewed his EKG which shows sinus tachycardia  I personally reviewed his chest x-ray which shows no obvious consolidation, effusion or pneumothorax     Assessment/Plan     Active Hospital Problems    Diagnosis  POA   • **Anasarca [R60.1]  Yes   • Elevated LFTs [R79.89]  Yes   • Acute urinary retention [R33.8]  Yes   • Obesity (BMI 30-39.9) [E66.9]  Yes   • Tobacco use disorder [F17.200]  Yes   • Elevated blood pressure reading [R03.0]  Yes      Resolved Hospital Problems   No resolved problems to display.       Mr. Rodrigez is a 41 y.o. male with no significant past medical history who presents with anasarca with penile swelling resulting in urinary retention.    Anasarca  -Unknown etiology.  Right-sided heart failure vs nephrotic syndrome.   Only trace protein on his UA and normal albumin.  Check lipid panel.  BNP 1300  -Echocardiogram, TSH and urine protein/creatinine ratio ordered.  Further work-up pending these results.   -Lasix 40 IV twice daily    Penile swelling, urinary retention related to above  -Tilley placed in the ED. can continue for now until swelling improves    Elevated LFTs  -Check hepatitis panel    · I discussed the patient's findings and my recommendations with patient and ED provider.  Patient was seen and evaluated on 12/7.  Documentation delayed due to patient care      VTE Prophylaxis - SCDs.  Code Status - Full code.        Stacey Ramos, [facility]  12/08/21  01:30 EST

## 2021-12-08 NOTE — ED NOTES
Nursing report ED to floor  Richard Rodrigez  41 y.o.  male    HPI :   Chief Complaint   Patient presents with   • Groin Swelling   • Urinary Retention       Admitting doctor:   Stacey Ramos DO    Admitting diagnosis:   The primary encounter diagnosis was Anasarca. Diagnoses of Urinary retention and Elevated liver enzymes were also pertinent to this visit.    Code status:   Current Code Status     Date Active Code Status Order ID Comments User Context       Not on file    Advance Care Planning Activity          Allergies:   Patient has no known allergies.    Intake and Output    Intake/Output Summary (Last 24 hours) at 12/7/2021 2044  Last data filed at 12/7/2021 2037  Gross per 24 hour   Intake --   Output 1600 ml   Net -1600 ml       Weight:   There were no vitals filed for this visit.    Most recent vitals:   Vitals:    12/07/21 2031 12/07/21 2033 12/07/21 2036 12/07/21 2042   BP: (!) 172/106  (!) 160/105    BP Location:       Patient Position:       Pulse: 108 106 103 100   Resp:       Temp:       TempSrc:       SpO2:  99% 100% 99%       Active LDAs/IV Access:   Lines, Drains & Airways     Active LDAs     Name Placement date Placement time Site Days    Peripheral IV 12/07/21 1752 Left Antecubital 12/07/21  1752  Antecubital  less than 1    Urethral Catheter Non-latex 16 Fr. 12/07/21  1740   less than 1                Labs (abnormal labs have a star):   Labs Reviewed   COMPREHENSIVE METABOLIC PANEL - Abnormal; Notable for the following components:       Result Value    CO2 30.7 (*)     Total Protein 8.6 (*)     ALT (SGPT) 60 (*)     AST (SGOT) 60 (*)     All other components within normal limits    Narrative:     GFR Normal >60  Chronic Kidney Disease <60  Kidney Failure <15     CBC WITH AUTO DIFFERENTIAL - Abnormal; Notable for the following components:    RBC 6.14 (*)     MCH 24.3 (*)     MCHC 30.2 (*)     RDW 17.7 (*)     Lymphocyte % 19.1 (*)     nRBC 0.8 (*)     All other components within normal limits    BNP (IN-HOUSE) - Abnormal; Notable for the following components:    proBNP 1,374.0 (*)     All other components within normal limits    Narrative:     Among patients with dyspnea, NT-proBNP is highly sensitive for the detection of acute congestive heart failure. In addition NT-proBNP of <300 pg/ml effectively rules out acute congestive heart failure with 99% negative predictive value.    Results may be falsely decreased if patient taking Biotin.     URINALYSIS W/ MICROSCOPIC IF INDICATED (NO CULTURE) - Abnormal; Notable for the following components:    Protein, UA Trace (*)     Leuk Esterase, UA Small (1+) (*)     All other components within normal limits   URINALYSIS, MICROSCOPIC ONLY - Abnormal; Notable for the following components:    WBC, UA 6-12 (*)     All other components within normal limits   TROPONIN (IN-HOUSE) - Normal    Narrative:     Troponin T Reference Range:  <= 0.03 ng/mL-   Negative for AMI  >0.03 ng/mL-     Abnormal for myocardial necrosis.  Clinicians would have to utilize clinical acumen, EKG, Troponin and serial changes to determine if it is an Acute Myocardial Infarction or myocardial injury due to an underlying chronic condition.       Results may be falsely decreased if patient taking Biotin.     MAGNESIUM - Normal   COVID PRE-OP / PRE-PROCEDURE SCREENING ORDER (NO ISOLATION)    Narrative:     The following orders were created for panel order COVID PRE-OP / PRE-PROCEDURE SCREENING ORDER (NO ISOLATION) - Swab, Nasopharynx.  Procedure                               Abnormality         Status                     ---------                               -----------         ------                     COVID-19,APTIMA PANTHER(...[[id number]]                      In process                   Please view results for these tests on the individual orders.   COVID-19,APTIMA PANTHER (ROE) [facility] ,NP/OP SWAB IN UTM/VTM/SALINE TRANSPORT MEDIA,24 HR TAT   RAINBOW DRAW    Narrative:     The following orders  were created for panel order [place] Draw.  Procedure                               Abnormality         Status                     ---------                               -----------         ------                     Green Top (Gel)[[id number]]                                  Final result               Lavender Top[[id number]]                                     Final result               Gold Top - SST[[id number]]                                   Final result               Light Blue Top[[id number]]                                   Final result                 Please view results for these tests on the individual orders.   CBC AND DIFFERENTIAL    Narrative:     The following orders were created for panel order CBC & Differential.  Procedure                               Abnormality         Status                     ---------                               -----------         ------                     CBC Auto Differential[[id number]]        Abnormal            Final result                 Please view results for these tests on the individual orders.   GREEN TOP   LAVENDER TOP   GOLD TOP - SST   LIGHT BLUE TOP       EKG:   ECG 12 Lead   Final Result   HEART RATE= 101  bpm   RR Interval= 594  ms   AK Interval= 196  ms   P Horizontal Axis= -7  deg   P Front Axis= 63  deg   QRSD Interval= 88  ms   QT Interval= 353  ms   QRS Axis= 104  deg   T Wave Axis= 56  deg   - OTHERWISE NORMAL ECG -   Sinus tachycardia   Right axis deviation   No Prior Tracing for Comparison   Electronically Signed By: Adrianna Sheikh ([facility]) 07-Dec-2021 18:25:56   Date and Time of Study: 2021-12-07 17:54:36          Meds given in ED:   Medications   sodium chloride 0.9 % flush 10 mL (has no administration in time range)   furosemide (LASIX) injection 80 mg (80 mg Intravenous Given 12/7/21 2031)       Imaging results:  XR Chest 1 View    Result Date: 12/7/2021  No acute findings  This report was finalized on 12/7/2021 6:41 PM by Dr. Bryan Houston,  M.D.        Ambulatory status:   - up with assist    Social issues:   Social History     Socioeconomic History   • Marital status:    Tobacco Use   • Smoking status: Current Every Day Smoker   Substance and Sexual Activity   • Alcohol use: Yes     Alcohol/week: 2.0 standard drinks     Types: 2 Shots of liquor per week   • Drug use: Never   • Sexual activity: Defer       NIH Stroke Scale:        Nursing report ED to floor:       Alejandra Feliciano RN  12/07/21 5132

## 2021-12-08 NOTE — PAYOR COMM NOTE
"INITIAL CLINICAL FOR REVIEW  REF #[id number]  F:  [phone]      Richard Rodrigez (41 y.o. Male)             Date of Birth Social Security Number Address Home Phone MRN    1980  [address] [address] [phone] [id number]    Christian Marital Status             None        Admission Date Admission Type Admitting Provider Attending Provider Department, Room/Bed    12/7/21 Emergency Stacey Ramos DO Reddy, Rahul Kandada, MD [address], [address]/1    Discharge Date Discharge Disposition Discharge Destination                         Attending Provider: Chacho Castro MD    Allergies: No Known Allergies    Isolation: None   Infection: None   Code Status: CPR   Advance Care Planning Activity    Ht: 182.9 cm (72\")   Wt: 170 kg (375 lb)    Admission Cmt: None   Principal Problem: Anasarca [R60.1]                 Active Insurance as of 12/7/2021     Primary Coverage     Payor Plan Insurance Group Employer/Plan Group    [employer] BLUE [employer]OSS [employer] BLUE CROSS BLUE [employer] PPO [id number]     Payor Plan Address Payor Plan Phone Number Payor Plan Fax Number Effective Dates    [address] [phone]  1/1/2021 - None Entered    [address] [address]       Subscriber Name Subscriber Birth Date Member ID       RICHARD RODRIGEZ 1980 [id number]                 Emergency Contacts      (Rel.) Home Phone Work Phone Mobile Phone    AIDE KING (Daughter) [phone] -- [phone]               History & Physical      Stacey Ramos DO at 12/07/21 3109              Patient Name:  Richard Rodrigez  YOB: 1980  MRN:  [id number]  Admit Date:  12/7/2021  Patient Care Team:  Provider, No Known as PCP - General      Subjective   History Present Illness     Chief Complaint   Patient presents with   • Groin Swelling   • Urinary Retention       History of Present Illness   Mr. Rodrigez is a 41 y.o. male with no significant past medical " "history who presents with penile swelling and difficulty urinating for the last 2 days.  He also has significant pitting edema up to his flanks.  He is not sure when the lower extremity swelling started, \"maybe a few weeks\".  He denies orthopnea, shortness of breath, or cough.    Review of Systems   Constitutional: Negative for appetite change, chills, fever and unexpected weight change.   HENT: Negative for trouble swallowing.    Respiratory: Negative for cough and shortness of breath.    Cardiovascular: Positive for leg swelling. Negative for chest pain and palpitations.   Gastrointestinal: Positive for abdominal distention. Negative for abdominal pain, blood in stool, constipation, diarrhea, nausea and vomiting.   Endocrine: Negative for polydipsia and polyuria.   Genitourinary: Positive for difficulty urinating, penile swelling and scrotal swelling. Negative for dysuria, flank pain and hematuria.   Neurological: Negative for dizziness, syncope and headaches.        Personal History     History reviewed. No pertinent past medical history.  Past Surgical History:   Procedure Laterality Date   • FRACTURE SURGERY       History reviewed. No pertinent family history.  Social History     Tobacco Use   • Smoking status: Current Every Day Smoker     Types: Cigars   • Smokeless tobacco: Not on file   Substance Use Topics   • Alcohol use: Yes     Alcohol/week: 2.0 standard drinks     Types: 2 Shots of liquor per week   • Drug use: Never     No current facility-administered medications on file prior to encounter.     No current outpatient medications on file prior to encounter.     No Known Allergies    Objective    Objective     Vital Signs  Temp:  [97.8 °F (36.6 °C)-97.9 °F (36.6 °C)] 97.9 °F (36.6 °C)  Heart Rate:  [] 94  Resp:  [14-18] 18  BP: (139-187)/() 139/84  SpO2:  [95 %-100 %] 95 %  on  Flow (L/min):  [4-8] 4;   Device (Oxygen Therapy): nasal cannula  There is no height or weight on file to calculate " BMI.    Physical Exam  Constitutional:       General: He is not in acute distress.     Appearance: He is obese. He is not diaphoretic.   HENT:      Head: Normocephalic and atraumatic.      Mouth/Throat:      Mouth: Mucous membranes are moist.      Pharynx: No oropharyngeal exudate or posterior oropharyngeal erythema.   Eyes:      General: No scleral icterus.     Extraocular Movements: Extraocular movements intact.      Pupils: Pupils are equal, round, and reactive to light.   Cardiovascular:      Rate and Rhythm: Normal rate and regular rhythm.      Pulses: Normal pulses.      Heart sounds: No murmur heard.  No gallop.    Pulmonary:      Effort: Pulmonary effort is normal. No respiratory distress.      Breath sounds: No wheezing or rhonchi.   Abdominal:      General: There is no distension.      Palpations: Abdomen is soft.      Tenderness: There is no abdominal tenderness. There is no guarding.   Musculoskeletal:         General: No tenderness or deformity.      Right lower leg: Edema present.      Left lower leg: Edema present.      Comments: Dependent pitting edema up to his flanks   Skin:     General: Skin is warm and dry.      Capillary Refill: Capillary refill takes less than 2 seconds.      Findings: No lesion or rash.   Neurological:      Mental Status: He is alert and oriented to person, place, and time.   Psychiatric:         Mood and Affect: Mood normal.         Results Review:  I reviewed the patient's new clinical results.  I reviewed the patient's new imaging results and agree with the interpretation.  I reviewed the patient's other test results and agree with the interpretation  I personally viewed and interpreted the patient's EKG/Telemetry data  Discussed with ED provider.    Lab Results (last 24 hours)     Procedure Component Value Units Date/Time    COVID PRE-OP / PRE-PROCEDURE SCREENING ORDER (NO ISOLATION) - Swab, Nasopharynx [[id number]]  (Normal) Collected: 12/07/21 3630    Specimen: Swab from  Nasopharynx Updated: 12/07/21 2256    Narrative:      The following orders were created for panel order COVID PRE-OP / PRE-PROCEDURE SCREENING ORDER (NO ISOLATION) - Swab, Nasopharynx.  Procedure                               Abnormality         Status                     ---------                               -----------         ------                     COVID-19,APTIMA PANTHER(...[[id number]]  Normal              Final result                 Please view results for these tests on the individual orders.    COVID-19,APTIMA PANTHER(ROE), SABINA, NP/OP SWAB IN UTM/VTM/SALINE TRANSPORT MEDIA,24 HR TAT - Swab, Nasopharynx [[id number]]  (Normal) Collected: 12/07/21 1748    Specimen: Swab from Nasopharynx Updated: 12/07/21 2256     COVID19 Not Detected    Narrative:      Fact sheet for providers: https://www.fda.gov/media/646935/download     Fact sheet for patients: https://www.fda.gov/media/983068/download    Test performed by RT PCR.    CBC & Differential [[id number]]  (Abnormal) Collected: 12/07/21 1749    Specimen: Blood Updated: 12/07/21 1838    Narrative:      The following orders were created for panel order CBC & Differential.  Procedure                               Abnormality         Status                     ---------                               -----------         ------                     CBC Auto Differential[[id number]]        Abnormal            Final result                 Please view results for these tests on the individual orders.    Comprehensive Metabolic Panel [[id number]]  (Abnormal) Collected: 12/07/21 1749    Specimen: Blood Updated: 12/07/21 1932     Glucose 86 mg/dL      BUN 14 mg/dL      Creatinine 1.01 mg/dL      Sodium 138 mmol/L      Potassium 4.6 mmol/L      Chloride 98 mmol/L      CO2 30.7 mmol/L      Calcium 9.1 mg/dL      Total Protein 8.6 g/dL      Albumin 3.70 g/dL      ALT (SGPT) 60 U/L      AST (SGOT) 60 U/L      Alkaline Phosphatase 91 U/L      Total Bilirubin 0.6 mg/dL      eGFR    Amer 99 mL/min/1.73      Globulin 4.9 gm/dL      A/G Ratio 0.8 g/dL      BUN/Creatinine Ratio 13.9     Anion Gap 9.3 mmol/L     Narrative:      GFR Normal >60  Chronic Kidney Disease <60  Kidney Failure <15      CBC Auto Differential [[id number]]  (Abnormal) Collected: 12/07/21 1749    Specimen: Blood Updated: 12/07/21 1838     WBC 6.45 10*3/mm3      RBC 6.14 10*6/mm3      Hemoglobin 14.9 g/dL      Hematocrit 49.3 %      MCV 80.3 fL      MCH 24.3 pg      MCHC 30.2 g/dL      RDW 17.7 %      RDW-SD 46.7 fl      MPV 9.8 fL      Platelets 281 10*3/mm3      Neutrophil % 69.6 %      Lymphocyte % 19.1 %      Monocyte % 8.8 %      Eosinophil % 1.7 %      Basophil % 0.6 %      Immature Grans % 0.2 %      Neutrophils, Absolute 4.49 10*3/mm3      Lymphocytes, Absolute 1.23 10*3/mm3      Monocytes, Absolute 0.57 10*3/mm3      Eosinophils, Absolute 0.11 10*3/mm3      Basophils, Absolute 0.04 10*3/mm3      Immature Grans, Absolute 0.01 10*3/mm3      nRBC 0.8 /100 WBC     BNP [[id number]]  (Abnormal) Collected: 12/07/21 1749    Specimen: Blood Updated: 12/07/21 1947     proBNP 1,374.0 pg/mL     Narrative:      Among patients with dyspnea, NT-proBNP is highly sensitive for the detection of acute congestive heart failure. In addition NT-proBNP of <300 pg/ml effectively rules out acute congestive heart failure with 99% negative predictive value.    Results may be falsely decreased if patient taking Biotin.      Troponin [[id number]]  (Normal) Collected: 12/07/21 1749    Specimen: Blood Updated: 12/07/21 1947     Troponin T 0.011 ng/mL     Narrative:      Troponin T Reference Range:  <= 0.03 ng/mL-   Negative for AMI  >0.03 ng/mL-     Abnormal for myocardial necrosis.  Clinicians would have to utilize clinical acumen, EKG, Troponin and serial changes to determine if it is an Acute Myocardial Infarction or myocardial injury due to an underlying chronic condition.       Results may be falsely decreased if patient taking Biotin.       Magnesium [[id number]]  (Normal) Collected: 12/07/21 1749    Specimen: Blood Updated: 12/07/21 1932     Magnesium 2.2 mg/dL     Urinalysis With Microscopic If Indicated (No Culture) - Urine, Catheter [[id number]]  (Abnormal) Collected: 12/07/21 1751    Specimen: Urine, Catheter Updated: 12/07/21 1821     Color, UA Yellow     Appearance, UA Clear     pH, UA 5.5     Specific Gravity, UA 1.016     Glucose, UA Negative     Ketones, UA Negative     Bilirubin, UA Negative     Blood, UA Negative     Protein, UA Trace     Leuk Esterase, UA Small (1+)     Nitrite, UA Negative     Urobilinogen, UA 1.0 E.U./dL    Urinalysis, Microscopic Only - Urine, Catheter [[id number]]  (Abnormal) Collected: 12/07/21 1751    Specimen: Urine, Catheter Updated: 12/07/21 1821     RBC, UA 0-2 /HPF      WBC, UA 6-12 /HPF      Bacteria, UA None Seen /HPF      Squamous Epithelial Cells, UA 0-2 /HPF      Hyaline Casts, UA 0-2 /LPF      Methodology Automated Microscopy    Protein / Creatinine Ratio, Urine - Urine, Clean Catch [[id number]] Collected: 12/08/21 0043    Specimen: Urine, Clean Catch Updated: 12/08/21 0108          Imaging Results (Last 24 Hours)     Procedure Component Value Units Date/Time    XR Chest 1 View [[id number]] Collected: 12/07/21 1840     Updated: 12/07/21 1844    Narrative:      AP CHEST     HISTORY: Swelling to abdomen and lower extremity     COMPARISON: None     FINDINGS: Lung fields appear clear. Heart size probably normal for  technique. No appreciable pneumothorax. No acute bony abnormality.       Impression:      No acute findings     This report was finalized on 12/7/2021 6:41 PM by Dr. Bryan Houston M.D.                 ECG 12 Lead   Final Result   HEART RATE= 101  bpm   RR Interval= 594  ms   [place] Interval= 196  ms   P Horizontal Axis= -7  deg   P Front Axis= 63  deg   QRSD Interval= 88  ms   QT Interval= 353  ms   QRS Axis= 104  deg   T Wave Axis= 56  deg   - OTHERWISE NORMAL ECG -   Sinus tachycardia   Right axis  deviation   No Prior Tracing for Comparison   Electronically Signed By: Adrianna Sheikh ([facility]) 07-Dec-2021 18:25:56   Date and Time of Study: 2021-12-07 17:54:36      I personally reviewed his EKG which shows sinus tachycardia  I personally reviewed his chest x-ray which shows no obvious consolidation, effusion or pneumothorax     Assessment/Plan     Active Hospital Problems    Diagnosis  POA   • **Anasarca [R60.1]  Yes   • Elevated LFTs [R79.89]  Yes   • Acute urinary retention [R33.8]  Yes   • Obesity (BMI 30-39.9) [E66.9]  Yes   • Tobacco use disorder [F17.200]  Yes   • Elevated blood pressure reading [R03.0]  Yes      Resolved Hospital Problems   No resolved problems to display.       Mr. Rodrigez is a 41 y.o. male with no significant past medical history who presents with anasarca with penile swelling resulting in urinary retention.    Anasarca  -Unknown etiology.  Right-sided heart failure vs nephrotic syndrome.   Only trace protein on his UA and normal albumin.  Check lipid panel.  BNP 1300  -Echocardiogram, TSH and urine protein/creatinine ratio ordered.  Further work-up pending these results.   -Lasix 40 IV twice daily    Penile swelling, urinary retention related to above  -Tilley placed in the ED. can continue for now until swelling improves    Elevated LFTs  -Check hepatitis panel    · I discussed the patient's findings and my recommendations with patient and ED provider.  Patient was seen and evaluated on 12/7.  Documentation delayed due to patient care      VTE Prophylaxis - SCDs.  Code Status - Full code.       Stacey Ramos DO  [facility]  12/08/21  01:30 EST      Electronically signed by Stacey Ramos DO at 12/08/21 0136          Emergency Department Notes      Randi Díaz, RN at 12/07/21 1515        Pt presents to ED with complaints of groin swelling x2 days, and decreased urination. Pt went to [facility] and they told him to come to the ER for additional scans. Pt is  A&Ox4, able to ambulate, and in a mask at this time.     Patient was placed in face mask during first look triage.  Patient was wearing a face mask throughout encounter.  I wore personal protective equipment throughout the encounter.  Hand hygiene was performed before and after patient encounter.       Randi Díaz RN  12/07/21 1519      Electronically signed by Randi Díaz RN at 12/07/21 1519     Alejandra Feliciano RN at 12/07/21 1729        Attempt IV x2 no success, call to Macho Leija RN to come place iv with ultrasound machine     Alejandra Feliciano RN  12/07/21 1729      Electronically signed by Alejandra Feliciano RN at 12/07/21 1729     Charo Medley APRN at 12/07/21 1734     Attestation signed by César Flores MD at 12/07/21 8609          For this patient encounter, I reviewed the NP or PA documentation, treatment plan, and medical decision making. César Flores MD 12/7/2021 21:36 EST                   EMERGENCY DEPARTMENT ENCOUNTER    Room Number:  07/07  Date of encounter:  12/7/2021  PCP: Provider, No Known  Historian: Patient      PPE    Patient was placed in face mask in first look. Patient was wearing facemask when I entered the room and throughout our encounter. I wore full protective equipment throughout this patient encounter including a CAPR face mask, and gloves. Hand hygiene was performed before donning protective equipment and after removal when leaving the room.        HPI:  Chief Complaint: Edema  A complete HPI/ROS/PMH/PSH/SH/FH are unobtainable due to: Nothing    Context: Richard Rodrigez is a 41 y.o. male who arrives to the ED via private vehicle.  Patient presents with c/o moderate, constant, swelling to his abdomen, bilateral groin, penis and bilateral lower extremities for the past 2 days.   Patient also complains of decreased urine output also for the past 2 days.  Patient states that it is difficult for him to urinate because he is so swollen.  Patient  denies fever, chills, chest pain, shortness of breath, nausea, vomiting, dizziness or any other symptoms.  Patient states that nothing makes the symptoms better and nothing worsens symptoms.  Patient states that he has no past medical history and takes no medications.  He states he did get his first COVID-19 vaccine 2 to 3 days ago.  He states he does drink at least 3 times a week.        PAST MEDICAL HISTORY  Active Ambulatory Problems     Diagnosis Date Noted   • No Active Ambulatory Problems     Resolved Ambulatory Problems     Diagnosis Date Noted   • No Resolved Ambulatory Problems     No Additional Past Medical History         PAST SURGICAL HISTORY  History reviewed. No pertinent surgical history.      FAMILY HISTORY  History reviewed. No pertinent family history.      SOCIAL HISTORY  Social History     Socioeconomic History   • Marital status:    Tobacco Use   • Smoking status: Current Every Day Smoker   Substance and Sexual Activity   • Alcohol use: Yes     Alcohol/week: 2.0 standard drinks     Types: 2 Shots of liquor per week   • Drug use: Never   • Sexual activity: Defer         ALLERGIES  Patient has no known allergies.        REVIEW OF SYSTEMS  Review of Systems     All systems reviewed and negative except for those discussed in HPI.        PHYSICAL EXAM    ED Triage Vitals [12/07/21 1520]   Temp Heart Rate Resp BP SpO2   97.8 °F (36.6 °C) 117 18 -- 96 %      Temp src Heart Rate Source Patient Position BP Location FiO2 (%)   Tympanic Monitor -- -- --       Physical Exam  GENERAL: Patient appears older than stated age, nontoxic appearing, not distressed  HENT: normocephalic, atraumatic  EYES: no scleral icterus, PERRL  CV: regular rhythm, tachycardic, no murmur  RESPIRATORY: normal effort, diminished  ABDOMEN: Large, swollen abdomen with erythema, no tenderness to palpation, normal bowel sounds  Patient has moderate swelling to bilateral groin, penis and scrotum..  ASHA Morgan was at bedside during  exam.  MUSCULOSKELETAL:Patient has 2+ pitting edema to bilateral lower extremities  NEURO: alert, moves all extremities, follows commands, mental status normal/baseline  SKIN: warm, dry, no rash   Psych: Appropriate mood and affect  Nursing notes and vital signs reviewed      LAB RESULTS  Recent Results (from the past 24 hour(s))   Comprehensive Metabolic Panel    Collection Time: 12/07/21  5:49 PM    Specimen: Blood   Result Value Ref Range    Glucose 86 65 - 99 mg/dL    BUN 14 6 - 20 mg/dL    Creatinine 1.01 0.76 - 1.27 mg/dL    Sodium 138 136 - 145 mmol/L    Potassium 4.6 3.5 - 5.2 mmol/L    Chloride 98 98 - 107 mmol/L    CO2 30.7 (H) 22.0 - 29.0 mmol/L    Calcium 9.1 8.6 - 10.5 mg/dL    Total Protein 8.6 (H) 6.0 - 8.5 g/dL    Albumin 3.70 3.50 - 5.20 g/dL    ALT (SGPT) 60 (H) 1 - 41 U/L    AST (SGOT) 60 (H) 1 - 40 U/L    Alkaline Phosphatase 91 39 - 117 U/L    Total Bilirubin 0.6 0.0 - 1.2 mg/dL    eGFR  African Amer 99 >60 mL/min/1.73    Globulin 4.9 gm/dL    A/G Ratio 0.8 g/dL    BUN/Creatinine Ratio 13.9 7.0 - 25.0    Anion Gap 9.3 5.0 - 15.0 mmol/L   CBC Auto Differential    Collection Time: 12/07/21  5:49 PM    Specimen: Blood   Result Value Ref Range    WBC 6.45 3.40 - 10.80 10*3/mm3    RBC 6.14 (H) 4.14 - 5.80 10*6/mm3    Hemoglobin 14.9 13.0 - 17.7 g/dL    Hematocrit 49.3 37.5 - 51.0 %    MCV 80.3 79.0 - 97.0 fL    MCH 24.3 (L) 26.6 - 33.0 pg    MCHC 30.2 (L) 31.5 - 35.7 g/dL    RDW 17.7 (H) 12.3 - 15.4 %    RDW-SD 46.7 37.0 - 54.0 fl    MPV 9.8 6.0 - 12.0 fL    Platelets 281 140 - 450 10*3/mm3    Neutrophil % 69.6 42.7 - 76.0 %    Lymphocyte % 19.1 (L) 19.6 - 45.3 %    Monocyte % 8.8 5.0 - 12.0 %    Eosinophil % 1.7 0.3 - 6.2 %    Basophil % 0.6 0.0 - 1.5 %    Immature Grans % 0.2 0.0 - 0.5 %    Neutrophils, Absolute 4.49 1.70 - 7.00 10*3/mm3    Lymphocytes, Absolute 1.23 0.70 - 3.10 10*3/mm3    Monocytes, Absolute 0.57 0.10 - 0.90 10*3/mm3    Eosinophils, Absolute 0.11 0.00 - 0.40 10*3/mm3     Basophils, Absolute 0.04 0.00 - 0.20 10*3/mm3    Immature Grans, Absolute 0.01 0.00 - 0.05 10*3/mm3    nRBC 0.8 (H) 0.0 - 0.2 /100 WBC   Green Top (Gel)    Collection Time: 12/07/21  5:49 PM   Result Value Ref Range    Extra Tube Hold for add-ons.    Lavender Top    Collection Time: 12/07/21  5:49 PM   Result Value Ref Range    Extra Tube hold for add-on    Gold Top - SST    Collection Time: 12/07/21  5:49 PM   Result Value Ref Range    Extra Tube Hold for add-ons.    Light Blue Top    Collection Time: 12/07/21  5:49 PM   Result Value Ref Range    Extra Tube hold for add-on    BNP    Collection Time: 12/07/21  5:49 PM    Specimen: Blood   Result Value Ref Range    proBNP 1,374.0 (H) 0.0 - 450.0 pg/mL   Troponin    Collection Time: 12/07/21  5:49 PM    Specimen: Blood   Result Value Ref Range    Troponin T 0.011 0.000 - 0.030 ng/mL   Magnesium    Collection Time: 12/07/21  5:49 PM    Specimen: Blood   Result Value Ref Range    Magnesium 2.2 1.6 - 2.6 mg/dL   Urinalysis With Microscopic If Indicated (No Culture) - Urine, Catheter    Collection Time: 12/07/21  5:51 PM    Specimen: Urine, Catheter   Result Value Ref Range    Color, UA Yellow Yellow, Straw    Appearance, UA Clear Clear    pH, UA 5.5 5.0 - 8.0    Specific Gravity, UA 1.016 1.005 - 1.030    Glucose, UA Negative Negative    Ketones, UA Negative Negative    Bilirubin, UA Negative Negative    Blood, UA Negative Negative    Protein, UA Trace (A) Negative    Leuk Esterase, UA Small (1+) (A) Negative    Nitrite, UA Negative Negative    Urobilinogen, UA 1.0 E.U./dL 0.2 - 1.0 E.U./dL   Urinalysis, Microscopic Only - Urine, Catheter    Collection Time: 12/07/21  5:51 PM    Specimen: Urine, Catheter   Result Value Ref Range    RBC, UA 0-2 None Seen, 0-2 /HPF    WBC, UA 6-12 (A) None Seen, 0-2 /HPF    Bacteria, UA None Seen None Seen /HPF    Squamous Epithelial Cells, UA 0-2 None Seen, 0-2 /HPF    Hyaline Casts, UA 0-2 None Seen /LPF    Methodology Automated  Microscopy    ECG 12 Lead    Collection Time: 12/07/21  5:54 PM   Result Value Ref Range    QT Interval 353 ms       Ordered the above labs and independently reviewed the results.      RADIOLOGY  XR Chest 1 View    Result Date: 12/7/2021  AP CHEST  HISTORY: Swelling to abdomen and lower extremity  COMPARISON: None  FINDINGS: Lung fields appear clear. Heart size probably normal for technique. No appreciable pneumothorax. No acute bony abnormality.      No acute findings  This report was finalized on 12/7/2021 6:41 PM by Dr. Bryan Houston M.D.        I ordered the above noted radiological studies and viewed the images on the PACS system.       EKG      Independently viewed by me and interpreted by Dr Flores         MEDICAL RECORD REVIEW  Medical records reviewed in [facility], patient was sent here urgent care center today.  Otherwise no relevant medical records to review in epic      PROCEDURES    Procedures        DIFFERENTIAL DIAGNOSIS  Differential diagnosis include but are not limited to the following: CHF, anasarca, electrolyte abnormality, DIEGO,      PROGRESS, DATA ANALYSIS, CONSULTS, AND MEDICAL DECISION MAKING        ED Course as of 12/07/21 2051   Tue Dec 07, 2021   1742 Discussed with patient that we will obtain labs, chest x-ray and EKG to evaluate for causes of his diffuse swelling.  Discussed with patient that he is going to require admission to the hospital.  Patient verbalized understanding and is agreeable to this plan. [MS]   1746 Bladder scan showed 469 cc.  Order placed for brown catheter. [MS]   1746 RN placed brown catheter with return of 800 cc of urine. [MS]   1832 BP(!): 187/117 [MS]   2000 proBNP(!): 1,374.0 [MS]   2016 Consult Note    Discussed care with Dr Ramos  Reviewed patient's history, exam, results and need for admission secondary to anasarca and urinary retention  Dr. Ramos accepts the patient to be admitted to telemetry inpatient bed.     [MS]      ED Course User Index  [MS]  Charo Medley APRN     ADMISSION    Discussed treatment plan and reason for admission with pt/family and admitting physician.  Pt/family voiced understanding of the plan for admission for further testing/treatment as needed.      DIAGNOSIS  Final diagnoses:   Anasarca   Urinary retention   Elevated liver enzymes           MEDICATIONS GIVEN IN ED    Medications   sodium chloride 0.9 % flush 10 mL (has no administration in time range)   furosemide (LASIX) injection 80 mg (80 mg Intravenous Given 12/7/21 2031)           COURSE & MEDICAL DECISION MAKING  Any/All labs and Any/All Imaging studies that were ordered were reviewed and are noted above.  Results were reviewed/discussed with the patient and they were also made aware of online access.    Pt also made aware that some labs, such as cultures, will not be resulted during ER visit and followup with PMD is necessary.        Charo Medley APRN  12/07/21 2051      Electronically signed by César Flores MD at 12/07/21 2136     César Flores MD at 12/07/21 1815        I have supervised the care provided by the midlevel provider.    We have discussed this patient's history, physical exam, and treatment plan.   I have reviewed the note and have personally examined the patient and agree with the plan of care.  See attached attending note.  My personal findings are below:    Patient complains of swelling of the legs, groin, scrotum, and abdomen for the past several days.  Also reports decreased urine output and difficulty urinating.  Denies fever, chills, chest pain, shortness of breath, vomiting, or diarrhea.  Patient denies any significant past medical history but has not seen a primary care doctor many years.    On exam: Awake and alert.  Heart is regular rate and rhythm.  Lungs are clear bilaterally.  Respiratory effort is normal.  Abdomen is obese, distended, and nontender.  There is edema of the scrotum and penis.  There is 2+ pitting  edema in both lower extremities.    Patient was found to have urinary retention and a Tilley catheter was placed with return of 800 mL of urine.  Labs and chest x-ray are pending.      EKG          EKG time: 1754  Rhythm/Rate: Sinus tachycardia, rate 101  P waves and SD: Normal  QRS, axis: RAD  ST and T waves: Normal    Interpreted Contemporaneously by me, independently viewed  No prior available for comparison       César Flores MD  12/07/21 2136      Electronically signed by César Flores MD at 12/07/21 2136     Tito Lee PCT at 12/07/21 1932        Pt in mask throughout encounter. This ERT was in appropriate ppe.       Tito Lee PCT  12/07/21 1932      Electronically signed by Tito Lee PCT at 12/07/21 1932     Alejandra eFliciano, RN at 12/07/21 2044          Nursing report ED to floor  Richard Rodrigez  41 y.o.  male    HPI :   Chief Complaint   Patient presents with   • Groin Swelling   • Urinary Retention       Admitting doctor:   Stacey Ramos DO    Admitting diagnosis:   The primary encounter diagnosis was Anasarca. Diagnoses of Urinary retention and Elevated liver enzymes were also pertinent to this visit.    Code status:   Current Code Status     Date Active Code Status Order ID Comments User Context       Not on file    Advance Care Planning Activity          Allergies:   Patient has no known allergies.    Intake and Output    Intake/Output Summary (Last 24 hours) at 12/7/2021 2044  Last data filed at 12/7/2021 2037  Gross per 24 hour   Intake --   Output 1600 ml   Net -1600 ml       Weight:   There were no vitals filed for this visit.    Most recent vitals:   Vitals:    12/07/21 2031 12/07/21 2033 12/07/21 2036 12/07/21 2042   BP: (!) 172/106  (!) 160/105    BP Location:       Patient Position:       Pulse: 108 106 103 100   Resp:       Temp:       TempSrc:       SpO2:  99% 100% 99%       Active LDAs/IV Access:   Lines, Drains & Airways     Active LDAs     Name Placement  date Placement time Site Days    Peripheral IV 12/07/21 1752 Left Antecubital 12/07/21  1752  Antecubital  less than 1    Urethral Catheter Non-latex 16 Fr. 12/07/21  1740   less than 1                Labs (abnormal labs have a star):   Labs Reviewed   COMPREHENSIVE METABOLIC PANEL - Abnormal; Notable for the following components:       Result Value    CO2 30.7 (*)     Total Protein 8.6 (*)     ALT (SGPT) 60 (*)     AST (SGOT) 60 (*)     All other components within normal limits    Narrative:     GFR Normal >60  Chronic Kidney Disease <60  Kidney Failure <15     CBC WITH AUTO DIFFERENTIAL - Abnormal; Notable for the following components:    RBC 6.14 (*)     MCH 24.3 (*)     MCHC 30.2 (*)     RDW 17.7 (*)     Lymphocyte % 19.1 (*)     nRBC 0.8 (*)     All other components within normal limits   BNP (IN-HOUSE) - Abnormal; Notable for the following components:    proBNP 1,374.0 (*)     All other components within normal limits    Narrative:     Among patients with dyspnea, NT-proBNP is highly sensitive for the detection of acute congestive heart failure. In addition NT-proBNP of <300 pg/ml effectively rules out acute congestive heart failure with 99% negative predictive value.    Results may be falsely decreased if patient taking Biotin.     URINALYSIS W/ MICROSCOPIC IF INDICATED (NO CULTURE) - Abnormal; Notable for the following components:    Protein, UA Trace (*)     Leuk Esterase, UA Small (1+) (*)     All other components within normal limits   URINALYSIS, MICROSCOPIC ONLY - Abnormal; Notable for the following components:    WBC, UA 6-12 (*)     All other components within normal limits   TROPONIN (IN-HOUSE) - Normal    Narrative:     Troponin T Reference Range:  <= 0.03 ng/mL-   Negative for AMI  >0.03 ng/mL-     Abnormal for myocardial necrosis.  Clinicians would have to utilize clinical acumen, EKG, Troponin and serial changes to determine if it is an Acute Myocardial Infarction or myocardial injury due to an  underlying chronic condition.       Results may be falsely decreased if patient taking Biotin.     MAGNESIUM - Normal   COVID PRE-OP / PRE-PROCEDURE SCREENING ORDER (NO ISOLATION)    Narrative:     The following orders were created for panel order COVID PRE-OP / PRE-PROCEDURE SCREENING ORDER (NO ISOLATION) - Swab, Nasopharynx.  Procedure                               Abnormality         Status                     ---------                               -----------         ------                     COVID-19,APTIMA PANTHER(...[[id number]]                      In process                   Please view results for these tests on the individual orders.   COVID-19,APTIMA PANTHER (ROE)[facility] ,NP/OP SWAB IN UTM/VTM/SALINE TRANSPORT MEDIA,24 HR TAT   RAINBOW DRAW    Narrative:     The following orders were created for panel order [place] Draw.  Procedure                               Abnormality         Status                     ---------                               -----------         ------                     Green Top (Gel)[[id number]]                                  Final result               Lavender Top[[id number]]                                     Final result               Gold Top - SST[[id number]]                                   Final result               Light Blue Top[[id number]]                                   Final result                 Please view results for these tests on the individual orders.   CBC AND DIFFERENTIAL    Narrative:     The following orders were created for panel order CBC & Differential.  Procedure                               Abnormality         Status                     ---------                               -----------         ------                     CBC Auto Differential[[id number]]        Abnormal            Final result                 Please view results for these tests on the individual orders.   GREEN TOP   LAVENDER TOP   GOLD TOP - SST   LIGHT BLUE TOP       EKG:   ECG  12 Lead   Final Result   HEART RATE= 101  bpm   RR Interval= 594  ms   NM Interval= 196  ms   P Horizontal Axis= -7  deg   P Front Axis= 63  deg   QRSD Interval= 88  ms   QT Interval= 353  ms   QRS Axis= 104  deg   T Wave Axis= 56  deg   - OTHERWISE NORMAL ECG -   Sinus tachycardia   Right axis deviation   No Prior Tracing for Comparison   Electronically Signed By: [facility]) 07-Dec-2021 18:25:56   Date and Time of Study: 2021-12-07 17:54:36          Meds given in ED:   Medications   sodium chloride 0.9 % flush 10 mL (has no administration in time range)   furosemide (LASIX) injection 80 mg (80 mg Intravenous Given 12/7/21 2031)       Imaging results:  XR Chest 1 View    Result Date: 12/7/2021  No acute findings  This report was finalized on 12/7/2021 6:41 PM by Dr. Bryan Houston M.D.        Ambulatory status:   - up with assist    Social issues:   Social History     Socioeconomic History   • Marital status:    Tobacco Use   • Smoking status: Current Every Day Smoker   Substance and Sexual Activity   • Alcohol use: Yes     Alcohol/week: 2.0 standard drinks     Types: 2 Shots of liquor per week   • Drug use: Never   • Sexual activity: Defer       NIH Stroke Scale:        Nursing report ED to floor:       Alejandra Feliciano RN  12/07/21 2044      Electronically signed by Alejandra Feliciano RN at 12/07/21 2044     Alejandra Feliciano RN at 12/07/21 2103        PPE per protocol utilized.         Alejandra Feliciano RN  12/07/21 2103      Electronically signed by Alejandra Feliciano RN at 12/07/21 2103       {Outbreak/Travel/Exposure Documentation......;  Question Available Choices Patient Response   COVID-19 Outbreak Screen:  Do you currently have a new onset of the following symptoms?        Fever/Chills, Cough, Shortness of air, Loss of taste or smell, No, Unknown  No (12/07/21 1520)   COVID-19 Outbreak Screen: In the last 14 days, have you had contact with anyone who is ill, has show any of the  symptoms listed above and/or has been diagnosis with the 2019 Novel Coronavirus? This includes any immediate household members but excludes any patients with whom you have been in contact within your normal work duties wearing proper PPE, if you are a healthcare worker.  Yes, No, Unknown              No (12/07/21 1520)   COVID-19 Outbreak Screen: Who was notified? Free text (not recorded)   Ebola Screening Outbreak Screen: Have you traveled to the [place] or [place] within the past 21 days?  Yes, No, Unknown (not recorded)   Ebola Screening Outbreak Screen: Do you have ANY of the following symptoms: Fever/Chills, Vomiting, Diarrhea, Fatigue, Headache, Muscle pain, Unexplained bleeding, Abdominal (stomach) pain, No, Unknown (not recorded)   Ebola Screening Outbreak Screen: Name of Person notified Free text (not recorded)   Travel Screen: Have you traveled in the last month? If so, to what country have you traveled? If [place] what state? Yes, No, Unknown  List of all countries  List of all States No (12/07/21 1520)  (not recorded)  (not recorded)   Infection Risk: Do you currently have the following symptoms?  (If cough is selected, the Tuberculosis Screen is performed.) Cough, Fever, Rash, No No (12/07/21 1520)   Tuberculosis Screen: Do you have any of the following Tuberculosis Risks?  · Have you lived or spent time with anyone who had or may have TB?  · Have you lived in or visited any of the following areas for more than one month: [place], [place], [place], [place] or [place], the [place] or [place]?  · Do you have HIV/AIDS?  · Have you lived in or worked in a nursing home, homeless shelter, correctional facility, or substance abuse treatment facility?   · No    If Yes do you have any of the following symptoms? Yes responses display to the right    If Yes, symptoms listed are:  Cough greater than or equal to 3 weeks, Loss of appetite, Unexplained weight loss, Night sweats, Bloody  sputum or hemoptysis, Hoarseness, Fever, Fatigue, Chest pain, No (not recorded)  (not recorded)   Exposure Screen: Have you been exposed to any of these contagious diseases in the last month? Measles, Chickenpox, Meningitis, Pertussis, Whooping Cough, No No (12/07/21 1520)       Vital Signs (last day)     Date/Time Temp Temp src Pulse Resp BP Patient Position SpO2    12/08/21 1100 97.1 (36.2) Oral 108 20 149/97 Lying 94    12/08/21 0822 97.3 (36.3) Oral 101 18 154/97 Lying 96    12/08/21 0700 97.3 (36.3) Oral 101 18 154/97 Sitting 96    12/08/21 0026 97.9 (36.6) Oral 94 18 139/84 -- 95    12/08/21 0024 97.9 (36.6) Oral 94 18 139/84 -- 95    12/07/21 2331 97.9 (36.6) Oral 94 18 139/84 Sitting 95    12/07/21 2146 -- -- 108 18 144/98 Lying 100    12/07/21 2101 -- -- 101 14 -- -- 99    12/07/21 2042 -- -- 100 -- -- -- 99    12/07/21 2036 -- -- 103 -- 160/105 -- 100    12/07/21 2033 -- -- 106 -- -- -- 99    12/07/21 2031 -- -- 108 -- 172/106 -- --    12/07/21 2009 -- -- 105 -- -- -- 100    12/07/21 2006 -- -- 102 -- 172/106 -- 100    12/07/21 1936 -- -- 103 -- 159/102 -- 100    12/07/21 1746 -- -- 100 -- -- -- 95    12/07/21 1742 -- -- -- -- 187/117 Lying --    12/07/21 1520 97.8 (36.6) Tympanic 117 18 -- -- 96          Oxygen Therapy (last day)     Date/Time SpO2 Device (Oxygen Therapy) Flow (L/min) Oxygen Concentration (%) ETCO2 (mmHg)    12/08/21 1100 94 nasal cannula 4 -- --    12/08/21 0822 96 nasal cannula -- -- --    12/08/21 0700 96 nasal cannula 4 -- --    12/08/21 0026 95 -- -- -- --    12/08/21 0024 95 nasal cannula 4 -- --    12/07/21 2331 95 nasal cannula -- -- --    12/07/21 2200 -- nasal cannula 4 -- --    12/07/21 2146 100 nasal cannula -- -- --    12/07/21 2101 99 nasal cannula 4 -- --    12/07/21 2042 99 -- -- -- --    12/07/21 2036 100 -- -- -- --    12/07/21 2033 99 -- -- -- --    12/07/21 2009 100 -- -- -- --    12/07/21 2006 100 -- -- -- --    12/07/21 1936 100 -- -- -- --    12/07/21 19:24:53 -- --  8 100 --    12/07/21 1746 95 -- -- -- --    12/07/21 1520 96 room air -- -- --          Lines, Drains & Airways     Active LDAs     Name Placement date Placement time Site Days    Peripheral IV 12/07/21 1752 Left Antecubital 12/07/21  1752  Antecubital  less than 1    Urethral Catheter Non-latex 16 Fr. 12/07/21  1740   less than 1                  Medication Administration Report for Richard Rodrigez as of 12/08/21 1444   Legend:    Given Hold Not Given Due Canceled Entry Other Actions    Time Time (Time) Time  Time-Action       Discontinued     Completed     Future     MAR Hold     Linked           Medications 12/07/21 12/08/21    furosemide (LASIX) injection 40 mg  Dose: 40 mg  Freq: 2 Times Daily (Diuretics) Route: IV  Start: 12/08/21 0900        1001-Given     1800              sodium chloride 0.9 % flush 10 mL  Dose: 10 mL  Freq: Every 12 Hours Scheduled Route: IV  Start: 12/07/21 2330        0043-Given     1002-Given     2100            Completed Medications  Medications 12/07/21 12/08/21       furosemide (LASIX) injection 80 mg  Dose: 80 mg  Freq: Once Route: IV  Start: 12/07/21 2004   End: 12/07/21 2031       2031-Given                     ,   Medication Administration Report for Richard Rodrigez as of 12/08/21 1444   Legend:    Given Hold Not Given Due Canceled Entry Other Actions    Time Time (Time) Time  Time-Action       Discontinued     Completed     Future     MAR Hold     Linked           Medications 12/07/21 12/08/21          and   Medication Administration Report for Richard Rodrigez as of 12/08/21 1444   Legend:    Given Hold Not Given Due Canceled Entry Other Actions    Time Time (Time) Time  Time-Action       Discontinued     Completed     Future     MAR Hold     Linked           Medications 12/07/21 12/08/21    acetaminophen (TYLENOL) tablet 650 mg  Dose: 650 mg  Freq: Every 4 Hours PRN Route: PO  PRN Reason: Mild Pain   Start: 12/07/21 2236   Admin Instructions:   Do not exceed 4 grams of  acetaminophen in a 24 hr period. Max dose of 2gm for AST/ALT greater than 120 units/L      If given for pain, use the following pain scale:   Mild Pain = Pain Score of 1-3, CPOT 1-2  Moderate Pain = Pain Score of 4-6, CPOT 3-4  Severe Pain = Pain Score of 7-10, CPOT 5-8        Or  acetaminophen (TYLENOL) 160 MG/5ML solution 650 mg  Dose: 650 mg  Freq: Every 4 Hours PRN Route: PO  PRN Reason: Mild Pain   Start: 12/07/21 2236   Admin Instructions:   Do not exceed 4 grams of acetaminophen in a 24 hr period. Max dose of 2gm for AST/ALT greater than 120 units/L      If given for pain, use the following pain scale:   Mild Pain = Pain Score of 1-3, CPOT 1-2  Moderate Pain = Pain Score of 4-6, CPOT 3-4  Severe Pain = Pain Score of 7-10, CPOT 5-8        Or  acetaminophen (TYLENOL) suppository 650 mg  Dose: 650 mg  Freq: Every 4 Hours PRN Route: RE  PRN Reason: Mild Pain   Start: 12/07/21 2236   Admin Instructions:   Do not exceed 4 grams of acetaminophen in a 24 hr period. Max dose of 2gm for AST/ALT greater than 120 units/L      If given for pain, use the following pain scale:   Mild Pain = Pain Score of 1-3, CPOT 1-2  Moderate Pain = Pain Score of 4-6, CPOT 3-4  Severe Pain = Pain Score of 7-10, CPOT 5-8         melatonin tablet 5 mg  Dose: 5 mg  Freq: Nightly PRN Route: PO  PRN Reason: Sleep  Start: 12/07/21 2236         nitroglycerin (NITROSTAT) SL tablet 0.4 mg  Dose: 0.4 mg  Freq: Every 5 Minutes PRN Route: SL  PRN Reason: Chest Pain  PRN Comment: Only if SBP Greater Than 100  Start: 12/07/21 2236   Admin Instructions:   If Pain Unrelieved After 3 Doses Notify MD         ondansetron (ZOFRAN) injection 4 mg  Dose: 4 mg  Freq: Every 6 Hours PRN Route: IV  PRN Reasons: Nausea,Vomiting  Start: 12/07/21 2236   Admin Instructions:   If BOTH ondansetron (ZOFRAN) and promethazine (PHENERGAN) are ordered use ondansetron first and THEN promethazine IF ondansetron is ineffective.         sodium chloride 0.9 % flush 10 mL  Dose:  10 mL  Freq: As Needed Route: IV  PRN Reason: Line Care  Start: 12/07/21 2236         sodium chloride 0.9 % flush 10 mL  Dose: 10 mL  Freq: As Needed Route: IV  PRN Reason: Line Care  Start: 12/07/21 1733                     Orders (active)      Start     Ordered    12/08/21 0906  Inpatient Case Management  Consult  Once        Provider:  (Not yet assigned)    12/08/21 0906    12/08/21 0900  furosemide (LASIX) injection 40 mg  2 Times Daily (Diuretics)         12/08/21 0115    12/08/21 0600  Basic Metabolic Panel  Daily       12/07/21 2237 12/08/21 0600  CBC & Differential  Daily       12/07/21 2237 12/08/21 0117  Code Status and Medical Interventions:  Continuous         12/08/21 0116    12/08/21 0000  Vital Signs  Every 4 Hours       12/07/21 2236 12/07/21 2330  sodium chloride 0.9 % flush 10 mL  Every 12 Hours Scheduled         12/07/21 2236 12/07/21 2238  Adult Transthoracic Echo Complete W/ Cont if Necessary Per Protocol  Once         12/07/21 2239 12/07/21 2237  Intake & Output  Every Shift       12/07/21 2236 12/07/21 2237  Weigh Patient  Once         12/07/21 2236 12/07/21 2237  Oxygen Therapy- Nasal Cannula; Titrate for SPO2: 90% - 95%  Continuous         12/07/21 2236 12/07/21 2237  Insert Peripheral IV  Once         12/07/21 2236 12/07/21 2237  Maintain Sequential Compression Device  Continuous         12/07/21 2236 12/07/21 2237  Telemetry - Maintain IV Access  Continuous         12/07/21 2236 12/07/21 2237  Continuous Cardiac Monitoring  Continuous         12/07/21 2236 12/07/21 2237  May Be Off Telemetry for Tests  Continuous         12/07/21 2236 12/07/21 2237  ACLS Protocol For Life Threatening Dysrhythmias (Unless Code Status Indicates Otherwise)  Continuous         12/07/21 2236 12/07/21 2237  Notify Provider if ACLS Protocol Activated  Until Discontinued         12/07/21 2236 12/07/21 2236  ondansetron (ZOFRAN) injection 4 mg  Every 6  "Hours PRN         12/07/21 2236 12/07/21 2236  melatonin tablet 5 mg  Nightly PRN         12/07/21 2236 12/07/21 2236  acetaminophen (TYLENOL) tablet 650 mg  Every 4 Hours PRN        \"Or\" Linked Group Details    12/07/21 2236 12/07/21 2236  acetaminophen (TYLENOL) 160 MG/5ML solution 650 mg  Every 4 Hours PRN        \"Or\" Linked Group Details    12/07/21 2236 12/07/21 2236  acetaminophen (TYLENOL) suppository 650 mg  Every 4 Hours PRN        \"Or\" Linked Group Details    12/07/21 2236 12/07/21 2236  nitroglycerin (NITROSTAT) SL tablet 0.4 mg  Every 5 Minutes PRN         12/07/21 2236 12/07/21 2236  sodium chloride 0.9 % flush 10 mL  As Needed         12/07/21 2236 12/07/21 2236  Diet Regular; Low Sodium; 2,000 mg Na  Diet Effective Now         12/07/21 2235 12/07/21 2002  [facility] (on-call MD unless specified) Details  Once        Specialty:  Hospitalist  Provider:  (Not yet assigned)    12/07/21 2002 12/07/21 1734  Assess Need for Indwelling Urinary Catheter - Follow Removal Protocol  Continuous        Comments: Indwelling Urinary Catheter Removal Criteria  Discontinue Indwelling Urinary Catheter Unless One of the Following is Present:  Urinary Retention or Obstruction  Chronic Urinary Catheter Use  End of Life  Critical Illness with Strict I/O   Tract or Abdominal Surgery  Stage 3/4 Sacral / Perineal Wound  Required Activity Restriction: Trauma  Required Activity Restriction: Spine Surgery  If Patient is Being Followed by Urology Contact Them PRIOR to Removal  Do Not Remove Indwelling Urinary Catheter Order is Present with a CLINICAL REASON to Maintain the Catheter. Provider is Required to Include a Clinical Reason to Maintain a Urinary Catheter    Patient Admitted With Indwelling Urinary Catheter (Not Placed at [facility])  Assess for Continued Need & Document Medical Necessity  If Infection is Suspected, Contact the Provider       \"And\" Linked Group Details    12/07/21 1734    " "12/07/21 1734  Urinary Catheter Care  Every Shift      \"And\" Linked Group Details    12/07/21 1734    12/07/21 1734  Insert peripheral IV  Once        \"And\" Linked Group Details    12/07/21 1734    12/07/21 1733  sodium chloride 0.9 % flush 10 mL  As Needed        \"And\" Linked Group Details    12/07/21 1734    Unscheduled  Telemetry - Pulse Oximetry  Continuous PRN      Comments: If Patient Develops Unresponsiveness, Acute Dyspnea, Cyanosis or Suspected Hypoxemia Start Continuous Pulse Ox Monitoring, Apply Oxygen & Notify Provider    12/07/21 2236    Unscheduled  Oxygen Therapy- Nasal Cannula; Titrate for SPO2: 90% - 95%  Continuous PRN      Comments: If Patient Develops Unresponsiveness, Acute Dyspnea, Cyanosis or Suspected Hypoxemia Start Continuous Pulse Ox Monitoring, Apply Oxygen & Notify Provider    12/07/21 2236    Unscheduled  ECG 12 Lead  As Needed      Comments: Nurse to Release if Patient Expericences Acute Chest Pain or Dysrhythmias    12/07/21 2236    Unscheduled  Potassium  As Needed      Comments: For Ventricular Arrhythmias      12/07/21 2236    Unscheduled  Magnesium  As Needed      Comments: For Ventricular Arrhythmias      12/07/21 2236    Unscheduled  Troponin  As Needed      Comments: For Chest Pain      12/07/21 2236    Unscheduled  Digoxin Level  As Needed      Comments: For Atrial Arrhythmias      12/07/21 2236    Unscheduled  Blood Gas, Arterial -  As Needed      Comments: Per O2 PolicyNotify Physician      12/07/21 2236    Unscheduled  Follow Acute Urinary Retention Protocol  As Needed       12/07/21 2236    Unscheduled  Measure Post Void Residual  As Needed       12/08/21 0116                "

## 2021-12-08 NOTE — CASE MANAGEMENT/SOCIAL WORK
Discharge Planning Assessment  [facility]     Patient Name: Richard Rodrigez  MRN: [id number]  Today's Date: 12/8/2021    Admit Date: 12/7/2021     Discharge Needs Assessment     Row Name 12/08/21 1518       Living Environment    Lives With alone    Current Living Arrangements home/apartment/condo    Primary Care Provided by self    Provides Primary Care For no one    Family Caregiver if Needed none    Quality of Family Relationships supportive       Resource/Environmental Concerns    Resource/Environmental Concerns none    Transportation Concerns car, none       Transition Planning    Patient/Family Anticipates Transition to home with family    Patient/Family Anticipated Services at Transition none       Discharge Needs Assessment    Equipment Currently Used at Home none    Concerns to be Addressed no discharge needs identified    Equipment Needed After Discharge none               Discharge Plan     Row Name 12/08/21 1518       Plan    Plan Home    Patient/Family in Agreement with Plan yes    Plan Comments Met with pt at bedside Introduced self, explained CCP role, facesheet verified.  Pt states he lives alone and independent with ADLs.  Pt states he does not have a PCP and declines assistance in obtaining one.  No history of DME, HH, or SNF.  Plans to return home at discharge, no identified needs.  JUDY Chacon RN              Continued Care and Services - Admitted Since 12/7/2021    Coordination has not been started for this encounter.          Demographic Summary     Row Name 12/08/21 1517       General Information    Admission Type inpatient    Arrived From home    Referral Source admission list    Reason for Consult discharge planning    Preferred Language English       Contact Information    Permission Granted to Share Info With family/designee  Navarro Bullock (daughter) [phone]               Functional Status    No documentation.                Psychosocial    No documentation.                Abuse/Neglect     No documentation.                Legal    No documentation.                Substance Abuse    No documentation.                Patient Forms    No documentation.                   Carlie Chacon RN

## 2021-12-09 ENCOUNTER — APPOINTMENT (OUTPATIENT)
Dept: ULTRASOUND IMAGING | Facility: HOSPITAL | Age: 41
End: 2021-12-09

## 2021-12-09 ENCOUNTER — APPOINTMENT (OUTPATIENT)
Dept: CARDIOLOGY | Facility: HOSPITAL | Age: 41
End: 2021-12-09

## 2021-12-09 LAB
ALBUMIN SERPL-MCNC: 3.3 G/DL (ref 3.5–5.2)
ALBUMIN/GLOB SERPL: 0.6 G/DL
ALP SERPL-CCNC: 99 U/L (ref 39–117)
ALT SERPL W P-5'-P-CCNC: 51 U/L (ref 1–41)
ANION GAP SERPL CALCULATED.3IONS-SCNC: 6.7 MMOL/L (ref 5–15)
AORTIC DIMENSIONLESS INDEX: 0.8 (DI)
AST SERPL-CCNC: 47 U/L (ref 1–40)
BASOPHILS # BLD AUTO: 0.02 10*3/MM3 (ref 0–0.2)
BASOPHILS NFR BLD AUTO: 0.3 % (ref 0–1.5)
BH CV ECHO MEAS - AO MAX PG (FULL): 2.2 MMHG
BH CV ECHO MEAS - AO MAX PG: 6 MMHG
BH CV ECHO MEAS - AO MEAN PG (FULL): 1.5 MMHG
BH CV ECHO MEAS - AO MEAN PG: 3.5 MMHG
BH CV ECHO MEAS - AO ROOT AREA (BSA CORRECTED): 1.2
BH CV ECHO MEAS - AO ROOT AREA: 8.6 CM^2
BH CV ECHO MEAS - AO ROOT DIAM: 3.3 CM
BH CV ECHO MEAS - AO V2 MAX: 122.2 CM/SEC
BH CV ECHO MEAS - AO V2 MEAN: 89.8 CM/SEC
BH CV ECHO MEAS - AO V2 VTI: 20.9 CM
BH CV ECHO MEAS - AVA(I,A): 3.6 CM^2
BH CV ECHO MEAS - AVA(I,D): 3.6 CM^2
BH CV ECHO MEAS - AVA(V,A): 3.4 CM^2
BH CV ECHO MEAS - AVA(V,D): 3.4 CM^2
BH CV ECHO MEAS - BSA(HAYCOCK): 3 M^2
BH CV ECHO MEAS - BSA: 2.8 M^2
BH CV ECHO MEAS - BZI_BMI: 50.9 KILOGRAMS/M^2
BH CV ECHO MEAS - BZI_METRIC_HEIGHT: 182.9 CM
BH CV ECHO MEAS - BZI_METRIC_WEIGHT: 170.1 KG
BH CV ECHO MEAS - EDV(CUBED): 135.8 ML
BH CV ECHO MEAS - EDV(MOD-SP2): 114 ML
BH CV ECHO MEAS - EDV(MOD-SP4): 111 ML
BH CV ECHO MEAS - EDV(TEICH): 126.1 ML
BH CV ECHO MEAS - EF(CUBED): 66.6 %
BH CV ECHO MEAS - EF(MOD-BP): 56.5 %
BH CV ECHO MEAS - EF(MOD-SP2): 54.4 %
BH CV ECHO MEAS - EF(MOD-SP4): 58.6 %
BH CV ECHO MEAS - EF(TEICH): 57.8 %
BH CV ECHO MEAS - ESV(CUBED): 45.3 ML
BH CV ECHO MEAS - ESV(MOD-SP2): 52 ML
BH CV ECHO MEAS - ESV(MOD-SP4): 46 ML
BH CV ECHO MEAS - ESV(TEICH): 53.2 ML
BH CV ECHO MEAS - FS: 30.6 %
BH CV ECHO MEAS - IVS/LVPW: 0.87
BH CV ECHO MEAS - IVSD: 1.2 CM
BH CV ECHO MEAS - LAT PEAK E' VEL: 17.4 CM/SEC
BH CV ECHO MEAS - LV DIASTOLIC VOL/BSA (35-75): 39.9 ML/M^2
BH CV ECHO MEAS - LV MASS(C)D: 278.9 GRAMS
BH CV ECHO MEAS - LV MASS(C)DI: 100.2 GRAMS/M^2
BH CV ECHO MEAS - LV MAX PG: 3.8 MMHG
BH CV ECHO MEAS - LV MEAN PG: 2.1 MMHG
BH CV ECHO MEAS - LV SYSTOLIC VOL/BSA (12-30): 16.5 ML/M^2
BH CV ECHO MEAS - LV V1 MAX: 97.1 CM/SEC
BH CV ECHO MEAS - LV V1 MEAN: 67.4 CM/SEC
BH CV ECHO MEAS - LV V1 VTI: 17.3 CM
BH CV ECHO MEAS - LVIDD: 5.1 CM
BH CV ECHO MEAS - LVIDS: 3.6 CM
BH CV ECHO MEAS - LVLD AP2: 9.7 CM
BH CV ECHO MEAS - LVLD AP4: 9 CM
BH CV ECHO MEAS - LVLS AP2: 8.1 CM
BH CV ECHO MEAS - LVLS AP4: 7.2 CM
BH CV ECHO MEAS - LVOT AREA (M): 4.2 CM^2
BH CV ECHO MEAS - LVOT AREA: 4.3 CM^2
BH CV ECHO MEAS - LVOT DIAM: 2.3 CM
BH CV ECHO MEAS - LVPWD: 1.4 CM
BH CV ECHO MEAS - MED PEAK E' VEL: 10.8 CM/SEC
BH CV ECHO MEAS - MV DEC SLOPE: 1018 CM/SEC^2
BH CV ECHO MEAS - MV DEC TIME: 114 SEC
BH CV ECHO MEAS - MV E MAX VEL: 117 CM/SEC
BH CV ECHO MEAS - MV MAX PG: 6.1 MMHG
BH CV ECHO MEAS - MV MEAN PG: 2.5 MMHG
BH CV ECHO MEAS - MV P1/2T MAX VEL: 124.9 CM/SEC
BH CV ECHO MEAS - MV P1/2T: 35.9 MSEC
BH CV ECHO MEAS - MV V2 MAX: 123.5 CM/SEC
BH CV ECHO MEAS - MV V2 MEAN: 74.2 CM/SEC
BH CV ECHO MEAS - MV V2 VTI: 18.6 CM
BH CV ECHO MEAS - MVA P1/2T LCG: 1.8 CM^2
BH CV ECHO MEAS - MVA(P1/2T): 6.1 CM^2
BH CV ECHO MEAS - MVA(VTI): 4 CM^2
BH CV ECHO MEAS - PA ACC TIME: 0.09 SEC
BH CV ECHO MEAS - PA MAX PG (FULL): 1.9 MMHG
BH CV ECHO MEAS - PA MAX PG: 3.1 MMHG
BH CV ECHO MEAS - PA PR(ACCEL): 38.6 MMHG
BH CV ECHO MEAS - PA V2 MAX: 88.2 CM/SEC
BH CV ECHO MEAS - RAP SYSTOLE: 3 MMHG
BH CV ECHO MEAS - RV MAX PG: 1.2 MMHG
BH CV ECHO MEAS - RV MEAN PG: 0.63 MMHG
BH CV ECHO MEAS - RV V1 MAX: 55.3 CM/SEC
BH CV ECHO MEAS - RV V1 MEAN: 37.3 CM/SEC
BH CV ECHO MEAS - RV V1 VTI: 9 CM
BH CV ECHO MEAS - SI(AO): 64.2 ML/M^2
BH CV ECHO MEAS - SI(CUBED): 32.5 ML/M^2
BH CV ECHO MEAS - SI(LVOT): 26.8 ML/M^2
BH CV ECHO MEAS - SI(MOD-SP2): 22.3 ML/M^2
BH CV ECHO MEAS - SI(MOD-SP4): 23.4 ML/M^2
BH CV ECHO MEAS - SI(TEICH): 26.2 ML/M^2
BH CV ECHO MEAS - SV(AO): 178.5 ML
BH CV ECHO MEAS - SV(CUBED): 90.5 ML
BH CV ECHO MEAS - SV(LVOT): 74.5 ML
BH CV ECHO MEAS - SV(MOD-SP2): 62 ML
BH CV ECHO MEAS - SV(MOD-SP4): 65 ML
BH CV ECHO MEAS - SV(TEICH): 72.9 ML
BH CV ECHO MEAS - TAPSE (>1.6): 2.3 CM
BH CV ECHO MEASUREMENTS AVERAGE E/E' RATIO: 8.3
BH CV XLRA - RV BASE: 3.7 CM
BH CV XLRA - RV LENGTH: 9.4 CM
BH CV XLRA - RV MID: 3.1 CM
BH CV XLRA - TDI S': 13.8 CM/SEC
BILIRUB SERPL-MCNC: 0.6 MG/DL (ref 0–1.2)
BUN SERPL-MCNC: 11 MG/DL (ref 6–20)
BUN/CREAT SERPL: 13.1 (ref 7–25)
CALCIUM SPEC-SCNC: 9.4 MG/DL (ref 8.6–10.5)
CHLORIDE SERPL-SCNC: 92 MMOL/L (ref 98–107)
CO2 SERPL-SCNC: 39.3 MMOL/L (ref 22–29)
CREAT SERPL-MCNC: 0.84 MG/DL (ref 0.76–1.27)
DEPRECATED RDW RBC AUTO: 49.2 FL (ref 37–54)
EOSINOPHIL # BLD AUTO: 0.12 10*3/MM3 (ref 0–0.4)
EOSINOPHIL NFR BLD AUTO: 1.6 % (ref 0.3–6.2)
ERYTHROCYTE [DISTWIDTH] IN BLOOD BY AUTOMATED COUNT: 17.9 % (ref 12.3–15.4)
GFR SERPL CREATININE-BSD FRML MDRD: 123 ML/MIN/1.73
GLOBULIN UR ELPH-MCNC: 5.7 GM/DL
GLUCOSE SERPL-MCNC: 123 MG/DL (ref 65–99)
HCT VFR BLD AUTO: 52.4 % (ref 37.5–51)
HGB BLD-MCNC: 15.1 G/DL (ref 13–17.7)
IMM GRANULOCYTES # BLD AUTO: 0.03 10*3/MM3 (ref 0–0.05)
IMM GRANULOCYTES NFR BLD AUTO: 0.4 % (ref 0–0.5)
LEFT ATRIUM VOLUME INDEX: 14.7 ML/M2
LV EF 2D ECHO EST: 57 %
LYMPHOCYTES # BLD AUTO: 0.85 10*3/MM3 (ref 0.7–3.1)
LYMPHOCYTES NFR BLD AUTO: 11.6 % (ref 19.6–45.3)
MCH RBC QN AUTO: 23.9 PG (ref 26.6–33)
MCHC RBC AUTO-ENTMCNC: 28.8 G/DL (ref 31.5–35.7)
MCV RBC AUTO: 82.9 FL (ref 79–97)
MONOCYTES # BLD AUTO: 0.7 10*3/MM3 (ref 0.1–0.9)
MONOCYTES NFR BLD AUTO: 9.5 % (ref 5–12)
NEUTROPHILS NFR BLD AUTO: 5.62 10*3/MM3 (ref 1.7–7)
NEUTROPHILS NFR BLD AUTO: 76.6 % (ref 42.7–76)
NRBC BLD AUTO-RTO: 0.4 /100 WBC (ref 0–0.2)
PLATELET # BLD AUTO: 296 10*3/MM3 (ref 140–450)
PMV BLD AUTO: 10.2 FL (ref 6–12)
POTASSIUM SERPL-SCNC: 4.3 MMOL/L (ref 3.5–5.2)
PROT SERPL-MCNC: 9 G/DL (ref 6–8.5)
RBC # BLD AUTO: 6.32 10*6/MM3 (ref 4.14–5.8)
SODIUM SERPL-SCNC: 138 MMOL/L (ref 136–145)
WBC NRBC COR # BLD: 7.34 10*3/MM3 (ref 3.4–10.8)

## 2021-12-09 PROCEDURE — 93306 TTE W/DOPPLER COMPLETE: CPT

## 2021-12-09 PROCEDURE — 85025 COMPLETE CBC W/AUTO DIFF WBC: CPT | Performed by: STUDENT IN AN ORGANIZED HEALTH CARE EDUCATION/TRAINING PROGRAM

## 2021-12-09 PROCEDURE — 76700 US EXAM ABDOM COMPLETE: CPT

## 2021-12-09 PROCEDURE — 94762 N-INVAS EAR/PLS OXIMTRY CONT: CPT

## 2021-12-09 PROCEDURE — 25010000002 PERFLUTREN (DEFINITY) 8.476 MG IN SODIUM CHLORIDE (PF) 0.9 % 10 ML INJECTION: Performed by: STUDENT IN AN ORGANIZED HEALTH CARE EDUCATION/TRAINING PROGRAM

## 2021-12-09 PROCEDURE — 99222 1ST HOSP IP/OBS MODERATE 55: CPT | Performed by: INTERNAL MEDICINE

## 2021-12-09 PROCEDURE — 80053 COMPREHEN METABOLIC PANEL: CPT | Performed by: INTERNAL MEDICINE

## 2021-12-09 PROCEDURE — 93306 TTE W/DOPPLER COMPLETE: CPT | Performed by: INTERNAL MEDICINE

## 2021-12-09 PROCEDURE — 25010000002 FUROSEMIDE PER 20 MG: Performed by: INTERNAL MEDICINE

## 2021-12-09 RX ORDER — LISINOPRIL 20 MG/1
20 TABLET ORAL EVERY 12 HOURS SCHEDULED
Status: DISCONTINUED | OUTPATIENT
Start: 2021-12-09 | End: 2021-12-13 | Stop reason: HOSPADM

## 2021-12-09 RX ORDER — HYDRALAZINE HYDROCHLORIDE 20 MG/ML
10 INJECTION INTRAMUSCULAR; INTRAVENOUS EVERY 4 HOURS PRN
Status: DISCONTINUED | OUTPATIENT
Start: 2021-12-09 | End: 2021-12-10

## 2021-12-09 RX ADMIN — FUROSEMIDE 60 MG: 10 INJECTION, SOLUTION INTRAMUSCULAR; INTRAVENOUS at 08:56

## 2021-12-09 RX ADMIN — LISINOPRIL 20 MG: 20 TABLET ORAL at 20:16

## 2021-12-09 RX ADMIN — PERFLUTREN 3 ML: 6.52 INJECTION, SUSPENSION INTRAVENOUS at 10:32

## 2021-12-09 RX ADMIN — FUROSEMIDE 60 MG: 10 INJECTION, SOLUTION INTRAMUSCULAR; INTRAVENOUS at 02:54

## 2021-12-09 RX ADMIN — FUROSEMIDE 60 MG: 10 INJECTION, SOLUTION INTRAMUSCULAR; INTRAVENOUS at 17:32

## 2021-12-09 NOTE — CONSULTS
Cardiology History & Physical / Consultation      Patient Name: Richard Rodrigez  Age/Sex: 41 y.o. male  : 1980  MRN: [id number]    Date of Admission: 2021  Date of Encounter Visit: 21  Encounter Provider: Dennsy Moreno MD  Referring Provider: Stacey Ramos DO  Place of Service: [facility] CARDIOLOGY  Patient Care Team:  Provider, No Known as PCP - General          Subjective:     Chief Complaint: edema    Reason for consultation: CHF    History of Present Illness:  Richard Rodrigez is a 41 y.o. male pt with no significant medical history.       Pt presented to ER with complaints of penile swelling and difficulty urinating for 2 days. Pt also having swelling to his abdomen, bilateral groin and bilateral lower extremities. Pt reported he had his first COVID vaccine 2-3 days ago. Pt reported he drinks at least 3 times a week. In ER ALT/AST 60/60, troponin 0.011, BNP 1374, UA negative for nitrates and bacteria, CXR showed nothing acute. /117.       Patient any type of chest discomfort.  He says it just started he has never had anything like this before.  He denies orthopnea exertional chest discomfort.  He has had a little bit of a cough here recently.      Past Medical History:  History reviewed. No pertinent past medical history.    Past Surgical History:   Procedure Laterality Date   • FRACTURE SURGERY         Home Medications:   No medications prior to admission.       Allergies:  No Known Allergies    Past Social History:  Social History     Socioeconomic History   • Marital status:    Tobacco Use   • Smoking status: Current Every Day Smoker     Types: Cigars   Substance and Sexual Activity   • Alcohol use: Yes     Alcohol/week: 2.0 standard drinks     Types: 2 Shots of liquor per week   • Drug use: Never   • Sexual activity: Defer       Past Family History: History reviewed. No pertinent family history.   History reviewed. No pertinent  family history.    Review of Systems   All other systems reviewed and are negative.          Objective:     Objective:  Temp:  [97.6 °F (36.4 °C)-98.4 °F (36.9 °C)] 98.4 °F (36.9 °C)  Heart Rate:  [105-117] 117  Resp:  [18-20] 18  BP: (106-179)/() 166/111    Intake/Output Summary (Last 24 hours) at 12/9/2021 1354  Last data filed at 12/9/2021 1300  Gross per 24 hour   Intake 600 ml   Output 75440 ml   Net -9400 ml     Body mass index is 50.86 kg/m².      12/08/21  0026 12/08/21  0822 12/09/21  1031   Weight: (!) 170 kg (375 lb) (!) 170 kg (375 lb) (!) 170 kg (375 lb)           Physical Exam:   Vitals reviewed.   Constitutional:       Appearance: Well-developed.   Eyes:      Conjunctiva/sclera: Conjunctivae normal.   HENT:      Head: Normocephalic.   Pulmonary:      Breath sounds: Normal breath sounds.   Cardiovascular:      Normal rate. Regular rhythm.   Edema:     Ankle: bilateral 1+ edema of the ankle.     Feet: bilateral 1+ edema of the feet.  Abdominal:      General: Bowel sounds are normal.      Palpations: Abdomen is soft.   Musculoskeletal: Normal range of motion.      Cervical back: Normal range of motion. Skin:     General: Skin is warm and dry.   Neurological:      Mental Status: Alert and oriented to person, place, and time.   Psychiatric:         Behavior: Behavior normal.          Labs:   Lab Review:     Results from last 7 days   Lab Units 12/09/21  0518 12/08/21  0828 12/07/21  1749   SODIUM mmol/L 138 136 138   POTASSIUM mmol/L 4.3 4.6 4.6   CHLORIDE mmol/L 92* 98 98   CO2 mmol/L 39.3* 34.1* 30.7*   BUN mg/dL 11 14 14   CREATININE mg/dL 0.84 0.95 1.01   GLUCOSE mg/dL 123* 148* 86   CALCIUM mg/dL 9.4 9.3 9.1   AST (SGOT) U/L 47*  --  60*   ALT (SGPT) U/L 51*  --  60*     Results from last 7 days   Lab Units 12/07/21  1749   TROPONIN T ng/mL 0.011     Results from last 7 days   Lab Units 12/09/21  0518   WBC 10*3/mm3 7.34   HEMOGLOBIN g/dL 15.1   HEMATOCRIT % 52.4*   PLATELETS 10*3/mm3 296          Results from last 7 days   Lab Units 12/07/21  1749   CHOLESTEROL mg/dL 133     Results from last 7 days   Lab Units 12/08/21  0828   MAGNESIUM mg/dL 2.2     Results from last 7 days   Lab Units 12/07/21  1749   CHOLESTEROL mg/dL 133   TRIGLYCERIDES mg/dL 81   HDL CHOL mg/dL 40   LDL CHOL mg/dL 77     Results from last 7 days   Lab Units 12/07/21  1749   PROBNP pg/mL 1,374.0*         Results from last 7 days   Lab Units 12/07/21  1749   TSH uIU/mL 3.040             Interpretation Summary    · Left ventricular wall thickness is consistent with mild concentric hypertrophy.  · Estimated left ventricular EF = 57% Left ventricular systolic function is normal.  · Left ventricular diastolic function was indeterminate.  · Mildly reduced right ventricular systolic function noted.        Assessment:       Anasarca    Acute urinary retention    Obesity (BMI 30-39.9)    Tobacco use disorder    Elevated blood pressure reading    Elevated LFTs        Plan:     1.  New onset lower extremity edema/anasarca.  Patient's echocardiogram noted above he has normal LV function 57% with some mild left ventricular dysfunction.  Patient is a large individual.  His hematocrit is elevated.  With his obesity and his right-sided issues sleep apnea is also significant concern.  2.  Patient is diuresed very nicely almost 7 L he says he is much better today.  He had any types of chest discomforts.  3.  Patient did get Covid vaccine.  Troponins not elevated ECG shows no ST or T wave abnormalities.  Patient's had no chest discomforts.  Also no wall motion abnormalities noted on echo.  4.  Treat symptomatic and see what evolves.    Thank you for allowing me to participate in the care of Richard Rodrigez. Feel free to contact me directly with any further questions or concerns.    Dennys Moreno MD  [facility]  12/09/21  13:54 EST

## 2021-12-09 NOTE — PROGRESS NOTES
Name: Richard Rodrigez ADMIT: 2021   : 1980  PCP: Provider, No Known    MRN: [id number] LOS: 2 days   AGE/SEX: 41 y.o. male  ROOM: [facility]/     Subjective   Subjective     Patient is lying on the bed and is in no major distress.  Denies nausea, vomiting, chest pain.  Does admit to shortness of breath.    Review of Systems     Objective   Objective   Vital Signs  Temp:  [98 °F (36.7 °C)-98.4 °F (36.9 °C)] 98.2 °F (36.8 °C)  Heart Rate:  [110-117] 113  Resp:  [18-19] 18  BP: (106-176)/() 137/82  SpO2:  [97 %] 97 %  on  Flow (L/min):  [4-11] 11;   Device (Oxygen Therapy): high-flow nasal cannula; humidified  Body mass index is 50.86 kg/m².  Physical Exam  Constitutional:       General: He is not in acute distress.     Appearance: Normal appearance. He is obese.   HENT:      Head: Atraumatic.      Nose: Nose normal.      Mouth/Throat:      Mouth: Mucous membranes are moist.   Eyes:      Extraocular Movements: Extraocular movements intact.      Conjunctiva/sclera: Conjunctivae normal.      Pupils: Pupils are equal, round, and reactive to light.   Cardiovascular:      Rate and Rhythm: Normal rate and regular rhythm.      Pulses: Normal pulses.      Heart sounds: Normal heart sounds.   Pulmonary:      Effort: Pulmonary effort is normal. No respiratory distress.      Breath sounds: Normal breath sounds.   Abdominal:      General: There is distension.      Tenderness: There is no abdominal tenderness.      Comments: Positive for ascites   Musculoskeletal:         General: Swelling present. Normal range of motion.      Cervical back: Neck supple. No rigidity.   Skin:     General: Skin is warm and dry.      Coloration: Skin is not jaundiced.   Neurological:      General: No focal deficit present.      Mental Status: He is alert and oriented to person, place, and time.   Psychiatric:         Mood and Affect: Mood normal.         Behavior: Behavior normal.             Results Review     I reviewed the patient's  new clinical results.  Results from last 7 days   Lab Units 12/09/21  0518 12/08/21  0828 12/07/21  1749   WBC 10*3/mm3 7.34 6.54 6.45   HEMOGLOBIN g/dL 15.1 14.7 14.9   PLATELETS 10*3/mm3 296 292 281     Results from last 7 days   Lab Units 12/09/21  0518 12/08/21  0828 12/07/21  1749   SODIUM mmol/L 138 136 138   POTASSIUM mmol/L 4.3 4.6 4.6   CHLORIDE mmol/L 92* 98 98   CO2 mmol/L 39.3* 34.1* 30.7*   BUN mg/dL 11 14 14   CREATININE mg/dL 0.84 0.95 1.01   GLUCOSE mg/dL 123* 148* 86   EGFR IF AFRICN AM mL/min/1.73 123 106 99     Results from last 7 days   Lab Units 12/09/21  0518 12/07/21  1749   ALBUMIN g/dL 3.30* 3.70   BILIRUBIN mg/dL 0.6 0.6   ALK PHOS U/L 99 91   AST (SGOT) U/L 47* 60*   ALT (SGPT) U/L 51* 60*     Results from last 7 days   Lab Units 12/09/21  0518 12/08/21  0828 12/07/21  1749   CALCIUM mg/dL 9.4 9.3 9.1   ALBUMIN g/dL 3.30*  --  3.70   MAGNESIUM mg/dL  --  2.2 2.2   PHOSPHORUS mg/dL  --  5.3*  --        COVID19   Date Value Ref Range Status   12/07/2021 Not Detected Not Detected - Ref. Range Final     Hemoglobin A1C   Date/Time Value Ref Range Status   12/07/2021 1749 6.40 (H) 4.80 - 5.60 % Final       Adult Transthoracic Echo Complete W/ Cont if Necessary Per Protocol  · Left ventricular wall thickness is consistent with mild concentric   hypertrophy.  · Estimated left ventricular EF = 57% Left ventricular systolic function   is normal.  · Left ventricular diastolic function was indeterminate.  · Mildly reduced right ventricular systolic function noted.     US Abdomen Complete  US ABDOMEN COMPLETE-     Clinical: Alcoholic hepatitis, ascites     No: Technically difficult examination due to large body habitus     Both kidneys were somewhat difficult to image. The right kidney is 11.4  cm in length and left kidney is 13.4 cm in length. No obstructive  uropathy or mass identified. Renal cortical echotexture within normal  limits.     There is a 1 cm mobile gallstone demonstrated. In addition  there is  adenomyomatosis of the gallbladder. No biliary duct dilatation CBD is 5  mm. No pericholecystic fluid or gallbladder wall thickening seen.     The abdominal aorta is partially visualized 2.4 cm in maximum diameter.  Visualized inferior vena cava within normal limits. Splenic size upper  limits of normal measuring 12.9 cm in maximum length. No free fluid is  demonstrated within the upper abdomen.     The pancreas cannot be adequately visualized. Obscured due to gas within  the overlying stomach. There is however a masslike area in the  generalized area of the pancreatic head measuring 5.3 x 4.4 x 4.4 cm.  Pancreatic head mass or enlarged caudate lobe of the liver are both  possibilities. Computed tomography recommended as follow-up.     Hepatic echotexture is heterogenous. Hepatic size normal to slightly  smaller than anticipated. The overall hepatic echotexture is greater  than anticipated, suggesting fatty infiltration. Areas of fatty sparing  noted. No focal liver lesion seen.     CONCLUSION:  1. Indeterminate masslike area in the general vicinity of the pancreatic  head, dedicated pancreatic protocol CT is recommended as follow-up.  2. Hepatic echotexture is heterogenous, overall greater than anticipated  consistent with areas of fatty infiltration. No focal liver lesion seen.  3. No free fluid within the upper abdomen.  4. Splenic size upper limits of normal.  5. Gallstone with adenomyomatosis of the gallbladder.     This report was finalized on 12/9/2021 8:09 AM by Dr. Darryl Dallas M.D.       Scheduled Medications  furosemide, 60 mg, Intravenous, Q8H    Infusions   Diet  Diet Regular; Thin; Consistent Carbohydrate, Low Sodium; 2,000 mg Na       Assessment/Plan     Active Hospital Problems    Diagnosis  POA   • **Anasarca [R60.1]  Yes   • Elevated LFTs [R79.89]  Yes   • Acute urinary retention [R33.8]  Yes   • Obesity (BMI 30-39.9) [E66.9]  Yes   • Tobacco use disorder [F17.200]  Yes   • Elevated  blood pressure reading [R03.0]  Yes      Resolved Hospital Problems   No resolved problems to display.       41 y.o. male admitted with Anasarca.    1. Anasarca/lower extremity edema, echo revealed normal ejection fraction with no wall motion abnormalities.  Diuresing quite well with IV Lasix which will be continued.  Liver ultrasound revealed fatty infiltration and possible mass in the pancreatic head.  UA with no significant proteinuria.  Overall his volume overload status has been improving and cardiology is following along as well.    2.  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  He was on folate and thiamine supplements as well.    3.  Obesity, counseled to lose weight.    4.  Tobacco abuse, counseled to quit smoking.    5.  Borderline diabetes mellitus, blood sugars are reasonably well controlled at the moment.    6.  Suspected pancreatic mass on ultrasound, CT of the abdomen and pelvis with contrast will be ordered to further evaluate this.    7.  On Lovenox for DVT prophylaxis.    8.  CODE STATUS is full code.    Chacho Castro MD  [facility]  12/09/21  16:48 EST

## 2021-12-09 NOTE — PLAN OF CARE
Goal Outcome Evaluation:      VSS, A&o x4 when awake, sinus tach, room air unless sleeping-patient required 11 LPM high flow to maintain O2 sats greater than 90%. Patient slept the majority of the shift. Patient educated and encouraged to shift positions frequently to prevent pressure injury. Patient slept supine and would not change positions. Patient with increased urine output after Lasix IV, output decreases a couple hours after Lasix. Patient had Echo and abd U/S done this shift.

## 2021-12-09 NOTE — PLAN OF CARE
Problem: Adult Inpatient Plan of Care  Goal: Plan of Care Review  Outcome: Ongoing, Progressing  Goal: Patient-Specific Goal (Individualized)  Outcome: Ongoing, Progressing  Goal: Absence of Hospital-Acquired Illness or Injury  Outcome: Ongoing, Progressing  Intervention: Identify and Manage Fall Risk  Recent Flowsheet Documentation  Taken 12/8/2021 2135 by Ivanna Mendes, RN  Safety Promotion/Fall Prevention:   activity supervised   fall prevention program maintained   nonskid shoes/slippers when out of bed   safety round/check completed  Goal: Optimal Comfort and Wellbeing  Outcome: Ongoing, Progressing  Intervention: Provide Person-Centered Care  Recent Flowsheet Documentation  Taken 12/8/2021 2135 by Ivanna Mendes, RN  Trust Relationship/Rapport:   care explained   choices provided  Goal: Readiness for Transition of Care  Outcome: Ongoing, Progressing     Problem: Skin Injury Risk Increased  Goal: Skin Health and Integrity  Outcome: Ongoing, Progressing     Problem: Fluid Volume Excess  Goal: Fluid Balance  Outcome: Ongoing, Progressing   Goal Outcome Evaluation:   VSS, overnight O2 study failed. 8L HFNC. Jarek remains. Up with SBA.

## 2021-12-10 ENCOUNTER — APPOINTMENT (OUTPATIENT)
Dept: CT IMAGING | Facility: HOSPITAL | Age: 41
End: 2021-12-10

## 2021-12-10 PROBLEM — K76.0 HEPATIC STEATOSIS: Status: ACTIVE | Noted: 2021-12-08

## 2021-12-10 PROBLEM — R29.818 SUSPECTED SLEEP APNEA: Status: ACTIVE | Noted: 2021-12-10

## 2021-12-10 PROBLEM — J96.02 ACUTE RESPIRATORY FAILURE WITH HYPOXIA AND HYPERCAPNIA: Status: ACTIVE | Noted: 2021-12-10

## 2021-12-10 PROBLEM — I10 HYPERTENSION: Status: ACTIVE | Noted: 2021-12-07

## 2021-12-10 PROBLEM — E66.2 OBESITY HYPOVENTILATION SYNDROME: Status: ACTIVE | Noted: 2021-12-10

## 2021-12-10 PROBLEM — J96.01 ACUTE RESPIRATORY FAILURE WITH HYPOXIA AND HYPERCAPNIA: Status: ACTIVE | Noted: 2021-12-10

## 2021-12-10 PROBLEM — E66.01 MORBID OBESITY WITH BMI OF 50.0-59.9, ADULT: Status: ACTIVE | Noted: 2021-12-07

## 2021-12-10 LAB
ALBUMIN SERPL-MCNC: 3.1 G/DL (ref 3.5–5.2)
ALBUMIN/GLOB SERPL: 0.6 G/DL
ALP SERPL-CCNC: 92 U/L (ref 39–117)
ALPHA-FETOPROTEIN: 1.11 NG/ML (ref 0–8.3)
ALT SERPL W P-5'-P-CCNC: 41 U/L (ref 1–41)
ANION GAP SERPL CALCULATED.3IONS-SCNC: 8.5 MMOL/L (ref 5–15)
ARTERIAL PATENCY WRIST A: ABNORMAL
AST SERPL-CCNC: 46 U/L (ref 1–40)
ATMOSPHERIC PRESS: 745.6 MMHG
BASE EXCESS BLDA CALC-SCNC: 15.3 MMOL/L (ref 0–2)
BASOPHILS # BLD AUTO: 0.03 10*3/MM3 (ref 0–0.2)
BASOPHILS NFR BLD AUTO: 0.4 % (ref 0–1.5)
BDY SITE: ABNORMAL
BILIRUB SERPL-MCNC: 0.7 MG/DL (ref 0–1.2)
BUN SERPL-MCNC: 11 MG/DL (ref 6–20)
BUN/CREAT SERPL: 12.1 (ref 7–25)
CALCIUM SPEC-SCNC: 9.6 MG/DL (ref 8.6–10.5)
CEA SERPL-MCNC: 2.29 NG/ML
CHLORIDE SERPL-SCNC: 88 MMOL/L (ref 98–107)
CO2 SERPL-SCNC: 40.5 MMOL/L (ref 22–29)
CREAT SERPL-MCNC: 0.91 MG/DL (ref 0.76–1.27)
D DIMER PPP FEU-MCNC: 2.61 MCGFEU/ML (ref 0–0.49)
DEPRECATED RDW RBC AUTO: 46.6 FL (ref 37–54)
EOSINOPHIL # BLD AUTO: 0.21 10*3/MM3 (ref 0–0.4)
EOSINOPHIL NFR BLD AUTO: 3.1 % (ref 0.3–6.2)
ERYTHROCYTE [DISTWIDTH] IN BLOOD BY AUTOMATED COUNT: 17.3 % (ref 12.3–15.4)
GAS FLOW AIRWAY: 7 LPM
GFR SERPL CREATININE-BSD FRML MDRD: 112 ML/MIN/1.73
GLOBULIN UR ELPH-MCNC: 5.5 GM/DL
GLUCOSE SERPL-MCNC: 96 MG/DL (ref 65–99)
HCO3 BLDA-SCNC: 49.8 MMOL/L (ref 22–28)
HCT VFR BLD AUTO: 51.1 % (ref 37.5–51)
HGB BLD-MCNC: 15.2 G/DL (ref 13–17.7)
IMM GRANULOCYTES # BLD AUTO: 0.02 10*3/MM3 (ref 0–0.05)
IMM GRANULOCYTES NFR BLD AUTO: 0.3 % (ref 0–0.5)
LYMPHOCYTES # BLD AUTO: 0.67 10*3/MM3 (ref 0.7–3.1)
LYMPHOCYTES NFR BLD AUTO: 9.8 % (ref 19.6–45.3)
MCH RBC QN AUTO: 24.1 PG (ref 26.6–33)
MCHC RBC AUTO-ENTMCNC: 29.7 G/DL (ref 31.5–35.7)
MCV RBC AUTO: 81.1 FL (ref 79–97)
MODALITY: ABNORMAL
MONOCYTES # BLD AUTO: 0.69 10*3/MM3 (ref 0.1–0.9)
MONOCYTES NFR BLD AUTO: 10.1 % (ref 5–12)
NEUTROPHILS NFR BLD AUTO: 5.19 10*3/MM3 (ref 1.7–7)
NEUTROPHILS NFR BLD AUTO: 76.3 % (ref 42.7–76)
NRBC BLD AUTO-RTO: 0.3 /100 WBC (ref 0–0.2)
PCO2 BLDA: 96 MM HG (ref 35–45)
PH BLDA: 7.32 PH UNITS (ref 7.35–7.45)
PLATELET # BLD AUTO: 292 10*3/MM3 (ref 140–450)
PMV BLD AUTO: 10.4 FL (ref 6–12)
PO2 BLDA: 101 MM HG (ref 80–100)
POTASSIUM SERPL-SCNC: 4.5 MMOL/L (ref 3.5–5.2)
PROT SERPL-MCNC: 8.6 G/DL (ref 6–8.5)
RBC # BLD AUTO: 6.3 10*6/MM3 (ref 4.14–5.8)
SAO2 % BLDCOA: 96.5 % (ref 92–99)
SODIUM SERPL-SCNC: 137 MMOL/L (ref 136–145)
TOTAL RATE: 24 BREATHS/MINUTE
TROPONIN T SERPL-MCNC: 0.02 NG/ML (ref 0–0.03)
WBC NRBC COR # BLD: 6.81 10*3/MM3 (ref 3.4–10.8)

## 2021-12-10 PROCEDURE — 94799 UNLISTED PULMONARY SVC/PX: CPT

## 2021-12-10 PROCEDURE — 0 IOPAMIDOL PER 1 ML: Performed by: HOSPITALIST

## 2021-12-10 PROCEDURE — 94660 CPAP INITIATION&MGMT: CPT

## 2021-12-10 PROCEDURE — 36600 WITHDRAWAL OF ARTERIAL BLOOD: CPT

## 2021-12-10 PROCEDURE — 84484 ASSAY OF TROPONIN QUANT: CPT | Performed by: NURSE PRACTITIONER

## 2021-12-10 PROCEDURE — 94761 N-INVAS EAR/PLS OXIMETRY MLT: CPT

## 2021-12-10 PROCEDURE — 0 DIATRIZOATE MEGLUMINE & SODIUM PER 1 ML: Performed by: HOSPITALIST

## 2021-12-10 PROCEDURE — 74170 CT ABD WO CNTRST FLWD CNTRST: CPT

## 2021-12-10 PROCEDURE — 85379 FIBRIN DEGRADATION QUANT: CPT | Performed by: NURSE PRACTITIONER

## 2021-12-10 PROCEDURE — 99232 SBSQ HOSP IP/OBS MODERATE 35: CPT | Performed by: NURSE PRACTITIONER

## 2021-12-10 PROCEDURE — 80053 COMPREHEN METABOLIC PANEL: CPT | Performed by: INTERNAL MEDICINE

## 2021-12-10 PROCEDURE — 25010000002 FUROSEMIDE PER 20 MG: Performed by: INTERNAL MEDICINE

## 2021-12-10 PROCEDURE — 82803 BLOOD GASES ANY COMBINATION: CPT

## 2021-12-10 PROCEDURE — 85025 COMPLETE CBC W/AUTO DIFF WBC: CPT | Performed by: STUDENT IN AN ORGANIZED HEALTH CARE EDUCATION/TRAINING PROGRAM

## 2021-12-10 RX ADMIN — LISINOPRIL 20 MG: 20 TABLET ORAL at 08:58

## 2021-12-10 RX ADMIN — SODIUM CHLORIDE, PRESERVATIVE FREE 10 ML: 5 INJECTION INTRAVENOUS at 08:58

## 2021-12-10 RX ADMIN — IOPAMIDOL 85 ML: 755 INJECTION, SOLUTION INTRAVENOUS at 17:03

## 2021-12-10 RX ADMIN — FUROSEMIDE 60 MG: 10 INJECTION, SOLUTION INTRAMUSCULAR; INTRAVENOUS at 08:58

## 2021-12-10 RX ADMIN — Medication 5 MG: at 23:18

## 2021-12-10 RX ADMIN — FUROSEMIDE 60 MG: 10 INJECTION, SOLUTION INTRAMUSCULAR; INTRAVENOUS at 19:00

## 2021-12-10 RX ADMIN — DIATRIZOATE MEGLUMINE AND DIATRIZOATE SODIUM 30 ML: 600; 100 SOLUTION ORAL; RECTAL at 13:47

## 2021-12-10 RX ADMIN — FUROSEMIDE 60 MG: 10 INJECTION, SOLUTION INTRAMUSCULAR; INTRAVENOUS at 01:29

## 2021-12-10 RX ADMIN — LISINOPRIL 20 MG: 20 TABLET ORAL at 22:16

## 2021-12-10 RX ADMIN — Medication 5 MG: at 00:54

## 2021-12-10 NOTE — PROGRESS NOTES
"    Patient Name: Richard Rodrigez  :1980  41 y.o.      Patient Care Team:  Provider, No Known as PCP - General    Chief Complaint: follow up anasarca    Interval History: continues to have large volume diuresis. He thinks is swelling is improving.        Objective   Vital Signs  Temp:  [98 °F (36.7 °C)-98.6 °F (37 °C)] 98 °F (36.7 °C)  Heart Rate:  [107-114] 108  Resp:  [16-18] 18  BP: ()/() 136/82    Intake/Output Summary (Last 24 hours) at 12/10/2021 1506  Last data filed at 12/10/2021 1300  Gross per 24 hour   Intake 720 ml   Output 4825 ml   Net -4105 ml     Flowsheet Rows      First Filed Value   Admission Height 182.9 cm (72\") Documented at 2021 0822   Admission Weight 170 kg (375 lb) Documented at 2021 2237          Physical Exam:   General Appearance:    Alert, cooperative, in no acute distress   Lungs:     Clear to auscultation.  Normal respiratory effort and rate.      Heart:    Regular rhythm and normal rate, normal S1 and S2, no murmurs, gallops or rubs.     Chest Wall:    No abnormalities observed   Abdomen:     Soft, nontender, positive bowel sounds.     Extremities:   no cyanosis, clubbing or edema.  No marked joint deformities.  Adequate musculoskeletal strength.       Results Review:    Results from last 7 days   Lab Units 12/10/21  0735   SODIUM mmol/L 137   POTASSIUM mmol/L 4.5   CHLORIDE mmol/L 88*   CO2 mmol/L 40.5*   BUN mg/dL 11   CREATININE mg/dL 0.91   GLUCOSE mg/dL 96   CALCIUM mg/dL 9.6     Results from last 7 days   Lab Units 21  1749   TROPONIN T ng/mL 0.011     Results from last 7 days   Lab Units 12/10/21  0735   WBC 10*3/mm3 6.81   HEMOGLOBIN g/dL 15.2   HEMATOCRIT % 51.1*   PLATELETS 10*3/mm3 292         Results from last 7 days   Lab Units 21  0828   MAGNESIUM mg/dL 2.2     Results from last 7 days   Lab Units 21  1749   CHOLESTEROL mg/dL 133   TRIGLYCERIDES mg/dL 81   HDL CHOL mg/dL 40   LDL CHOL mg/dL 77               Medication " Review:   furosemide, 60 mg, Intravenous, Q8H  lisinopril, 20 mg, Oral, Q12H              Assessment/Plan   1. New onset lower extremity edema/anacarca.  -- hypoxic, RV dysfunction, sinus tachycardia. Check ddimer, if positive. Rule out PE with CTA chest.  Continue IV diuresis. Renal function stable.   2. Acute hypoxic respiratory failure - volume overload, obesity hypoventilation, rule out PE as above if ddimer elevated.   3. Pancreatic mass - evaluation underway  4. Morbid obesity BMI 50  5. Suspected sleep apnea - will need outpatient evaluation. pulm has been consulted.     QUIQUE Morales  [facility]  12/10/21  15:06 EST  .

## 2021-12-10 NOTE — CASE MANAGEMENT/SOCIAL WORK
Continued Stay Note  [facility]     Patient Name: Richard Rodrigez  MRN: [id number]  Today's Date: 12/10/2021    Admit Date: 12/7/2021     Discharge Plan     Row Name 12/10/21 1325       Plan    Plan Home    Patient/Family in Agreement with Plan yes    Plan Comments Met with pt at bedside who confirms plan to return home at discharge.  Pt is agreeable to PCP appt now.  New pt appt made for Ulises Scooby Friday Dec 17 at 10:30am.  Pt informed and agreeable.  CCP will continue to follow.  JUDY Chacon RN               Discharge Codes    No documentation.                     Carlie Chacon, RN

## 2021-12-10 NOTE — NURSING NOTE
"This nurse woke patient x3 to drink oral contrast for CT Abd & pelvis. Patient stated \"ok\" and went right back to sleep each time. Contrast left on bedside table clearly marked contrast.   "

## 2021-12-10 NOTE — PLAN OF CARE
Problem: Adult Inpatient Plan of Care  Goal: Plan of Care Review  Outcome: Ongoing, Progressing  Flowsheets (Taken 12/10/2021 0453)  Plan of Care Reviewed With: patient  Outcome Summary:   Patient has +2 edema BLE to abdomen. Patient on IV Lasix over night   increased urine output. Patient requiring high flow NC while sleeping to maintain O2. Patient's vss   will continue to monitor.  Goal: Patient-Specific Goal (Individualized)  Outcome: Ongoing, Progressing  Goal: Absence of Hospital-Acquired Illness or Injury  Outcome: Ongoing, Progressing  Intervention: Identify and Manage Fall Risk  Recent Flowsheet Documentation  Taken 12/10/2021 0400 by Arleen Becerra RN  Safety Promotion/Fall Prevention:   activity supervised   clutter free environment maintained   gait belt   room organization consistent   safety round/check completed  Taken 12/10/2021 0200 by Arleen Becerra RN  Safety Promotion/Fall Prevention:   activity supervised   clutter free environment maintained   gait belt   nonskid shoes/slippers when out of bed   safety round/check completed   room organization consistent  Taken 12/9/2021 2020 by Arleen Becerra RN  Safety Promotion/Fall Prevention:   activity supervised   clutter free environment maintained   nonskid shoes/slippers when out of bed   room organization consistent   safety round/check completed  Intervention: Prevent Skin Injury  Recent Flowsheet Documentation  Taken 12/10/2021 0400 by Arleen Becerra RN  Body Position: position changed independently  Taken 12/9/2021 2020 by Arleen Becerra RN  Body Position: position changed independently  Intervention: Prevent Infection  Recent Flowsheet Documentation  Taken 12/10/2021 0200 by Arleen Becerra, RN  Infection Prevention:   hand hygiene promoted   rest/sleep promoted   single patient room provided  Taken 12/9/2021 2020 by Arleen Becerra RN  Infection Prevention:   rest/sleep promoted   single patient room provided   hand hygiene  promoted  Goal: Optimal Comfort and Wellbeing  Outcome: Ongoing, Progressing  Intervention: Provide Person-Centered Care  Recent Flowsheet Documentation  Taken 12/10/2021 0200 by Arleen Becerra RN  Trust Relationship/Rapport: care explained  Taken 12/9/2021 2020 by Arleen Becerra, RN  Trust Relationship/Rapport: care explained  Goal: Readiness for Transition of Care  Outcome: Ongoing, Progressing     Problem: Skin Injury Risk Increased  Goal: Skin Health and Integrity  Outcome: Ongoing, Progressing  Intervention: Optimize Skin Protection  Recent Flowsheet Documentation  Taken 12/10/2021 0400 by Arleen Becerra, RN  Head of Bed (HOB): HOB at 30-45 degrees  Taken 12/9/2021 2020 by Arleen Becerra RN  Head of Bed (HOB): HOB at 30-45 degrees     Problem: Fluid Volume Excess  Goal: Fluid Balance  Outcome: Ongoing, Progressing   Goal Outcome Evaluation:  Plan of Care Reviewed With: patient           Outcome Summary: Patient has +2 edema BLE to abdomen. Patient on IV Lasix over night; increased urine output. Patient requiring high flow NC while sleeping to maintain O2. Patient's vss; will continue to monitor.

## 2021-12-10 NOTE — PROGRESS NOTES
Name: Richard Rodrigez ADMIT: 2021   : 1980  PCP: Provider, No Known    MRN: [id number] LOS: 3 days   AGE/SEX: 41 y.o. male  ROOM: [facility]/     Subjective   Subjective   Feels overall some better.  Denies difficulty breathing.  Has voided large amounts with Tilley catheter and IV Lasix.  Tolerating diet.  Bowels moving.    Review of Systems     Objective   Objective   Vital Signs  Temp:  [98 °F (36.7 °C)-98.6 °F (37 °C)] 98 °F (36.7 °C)  Heart Rate:  [107-114] 107  Resp:  [16-18] 18  BP: ()/() 136/82  SpO2:  [97 %-100 %] 97 %  on  Flow (L/min):  [10-11] 10;   Device (Oxygen Therapy): high-flow nasal cannula; humidified  Body mass index is 50.86 kg/m².    Intake/Output Summary (Last 24 hours) at 12/10/2021 1428  Last data filed at 12/10/2021 1300  Gross per 24 hour   Intake 720 ml   Output 4825 ml   Net -4105 ml     Wt Readings from Last 1 Encounters:   21 1031 (!) 170 kg (375 lb)   21 0822 (!) 170 kg (375 lb)   21 0026 (!) 170 kg (375 lb)   21 0024 (!) 170 kg (375 lb)   21 2237 (!) 170 kg (375 lb)       Physical Exam  Vitals and nursing note reviewed.   Constitutional:       General: He is not in acute distress.     Appearance: Normal appearance. He is obese.   Neck:      Vascular: No JVD.      Trachea: No tracheal deviation.   Cardiovascular:      Rate and Rhythm: Normal rate and regular rhythm.      Heart sounds: Normal heart sounds.   Pulmonary:      Effort: Pulmonary effort is normal. No respiratory distress.      Breath sounds: Normal breath sounds.   Abdominal:      General: Bowel sounds are normal.      Palpations: Abdomen is soft.      Tenderness: There is no abdominal tenderness.   Musculoskeletal:      Right lower leg: Edema present.      Left lower leg: Edema present.   Skin:     General: Skin is warm and dry.   Neurological:      General: No focal deficit present.      Mental Status: He is alert and oriented to person, place, and time.   Psychiatric:          Behavior: Behavior normal. Behavior is cooperative.         Results Review     I reviewed the patient's new clinical results.  Results from last 7 days   Lab Units 12/10/21  0735 12/09/21  0518 12/08/21  0828 12/07/21  1749   WBC 10*3/mm3 6.81 7.34 6.54 6.45   HEMOGLOBIN g/dL 15.2 15.1 14.7 14.9   PLATELETS 10*3/mm3 292 296 292 281     Results from last 7 days   Lab Units 12/10/21  0735 12/09/21  0518 12/08/21  0828 12/07/21  1749   SODIUM mmol/L 137 138 136 138   POTASSIUM mmol/L 4.5 4.3 4.6 4.6   CHLORIDE mmol/L 88* 92* 98 98   CO2 mmol/L 40.5* 39.3* 34.1* 30.7*   BUN mg/dL 11 11 14 14   CREATININE mg/dL 0.91 0.84 0.95 1.01   GLUCOSE mg/dL 96 123* 148* 86   EGFR IF AFRICN AM mL/min/1.73 112 123 106 99     Results from last 7 days   Lab Units 12/10/21  0735 12/09/21  0518 12/07/21  1749   ALBUMIN g/dL 3.10* 3.30* 3.70   BILIRUBIN mg/dL 0.7 0.6 0.6   ALK PHOS U/L 92 99 91   AST (SGOT) U/L 46* 47* 60*   ALT (SGPT) U/L 41 51* 60*     Results from last 7 days   Lab Units 12/10/21  0735 12/09/21  0518 12/08/21  0828 12/07/21  1749   CALCIUM mg/dL 9.6 9.4 9.3 9.1   ALBUMIN g/dL 3.10* 3.30*  --  3.70   MAGNESIUM mg/dL  --   --  2.2 2.2   PHOSPHORUS mg/dL  --   --  5.3*  --    )    Results from last 7 days   Lab Units 12/07/21  1749   TRIGLYCERIDES mg/dL 81       Results from last 7 days   Lab Units 12/07/21  1748   COVID19  Not Detected     Scheduled Medications  furosemide, 60 mg, Intravenous, Q8H  lisinopril, 20 mg, Oral, Q12H    Infusions   Diet  Diet Regular; Thin; Low Sodium; 2,000 mg Na  CIWA (since admission)     None             Assessment/Plan     Active Hospital Problems    Diagnosis  POA   • **Anasarca [R60.1]  Yes   • Suspected sleep apnea [R29.818]  Yes   • Hepatic steatosis [K76.0]  Yes   • Acute urinary retention [R33.8]  Yes   • Morbid obesity with BMI of 50.0-59.9, adult (HCC) [E66.01, Z68.43]  Not Applicable   • Tobacco use disorder [F17.200]  Yes   • Elevated blood pressure reading [R03.0]  Yes       Resolved Hospital Problems   No resolved problems to display.       41 y.o. male with Anasarca.    Patient admitted with anasarca of uncertain etiology.  He is morbidly obese but otherwise has no known PMH because he has no PCP.  He has been seen by cardiology and echocardiogram shows normal EF.  He has mild LVH and mildly reduced RVSF.  He has suspected severe sleep apnea and likely has some right-sided failure.  Severe oxygen desaturations while sleeping.  Discussed with nursing about turning down his oxygen while he is awake.  Will check ABG.  Consult pulmonology.  Wean oxygen as tolerated.  Will definitely need sleep study.  May need CPAP while in hospital setting.    Liver ultrasound shows fatty infiltration of the liver and possible pancreatic head mass.  CT scan abd/pelvis ordered without IV contrast.  Order changed to pancreatic protocol.  Check tumor markers.  Patient has history of alcohol abuse.  CIWA protocol not ordered but thus far no signs of withdrawal (does not drink daily).  AST/ALT minimally elevated on admission improving. Suspect due to fatty liver vs hepatic congestion.       Urinary retention in ED.  Will discontinue Tilley catheter and perform intermittent catheterization if needed.  Only trace protein on UA.      · SCDs for DVT prophylaxis.  Will have pharmacy add Lovenox.  · Full code.  · Discussed with patient, nursing staff and CCP.  · Anticipate discharge home next week.      Abrahan Godwin MD  [facility]  12/10/21  14:28 EST

## 2021-12-10 NOTE — PLAN OF CARE
Goal Outcome Evaluation:   BP stable, patient with sinus tach, A&O x4, O2 via high flow NC at 10 LPM d/t patient continuously desating, ABG performed showing patient pCO2 at 96. Pulmonary consult requested by A. Patient has slept majority of day. When patient was awake O2 was taken off until patient desat indicating that he had went back to sleep. Patient brown d/c at 1600 and patient due to void at 2000. Pt had CT abd done this shift. Patient refused up to chair. Patient educated that he needs to shift his position frequently to prevent HAPI. Patient encouraged to clean up to prevent infection.

## 2021-12-10 NOTE — CONSULTS
"CONSULT NOTE    Patient Identification:  Richard Rodrigez  41 y.o.  male  1980  [id number]            Requesting physician: Hospitalist    Reason for Consultation: DEJAN suspected    CC:     History of Present Illness:  41-year-old gentleman with morbid obesity admitted to the hospitalist service with penile swelling and difficulty urinating.  He was noted to have anasarca with increased leg swelling and abdominal distention.  Patient has been initiated on aggressive diuresis.  His echo showed reduced RV dysfunction.  Reports snoring with excessive daytime sleepiness.  No heavy alcohol use abuse or parasomnias reported.  No previous diagnosis of sleep apnea reported.  Also reports that he smokes cigars.  Currently is requiring High flow nasal cannula oxygen at 5 L nasal cannula.  He denies any active chest pain.      Review of Systems  As above rest of the 12 point review system negative  Past Medical History:  History reviewed. No pertinent past medical history.    Past Surgical History:  Past Surgical History:   Procedure Laterality Date   • FRACTURE SURGERY          Home Meds:  No medications prior to admission.       Allergies:  No Known Allergies    Social History:   Social History     Socioeconomic History   • Marital status:    Tobacco Use   • Smoking status: Current Every Day Smoker     Types: Cigars   Substance and Sexual Activity   • Alcohol use: Yes     Alcohol/week: 2.0 standard drinks     Types: 2 Shots of liquor per week   • Drug use: Never   • Sexual activity: Defer       Family History:  History reviewed. No pertinent family history.    Physical Exam:  /77   Pulse 113   Temp 98.1 °F (36.7 °C) (Oral)   Resp 16   Ht 182.9 cm (72\")   Wt (!) 170 kg (375 lb)   SpO2 (!) 88%   BMI 50.86 kg/m²  Body mass index is 50.86 kg/m². (!) 88% (!) 170 kg (375 lb)  Physical Exam  Patient is examined using the personal protective equipment as per guidelines from infection control for this particular " patient as enacted.  Hand hygiene was performed before and after patient interaction.  Well-developed normal body habitus  Eyes normal conjunctivae and pupils reactive to light  ENT Mallampati between 3 and 4 normal nasal exam  Neck midline trachea no thyromegaly  Chest no labored breathing clear  CVS regular rate and rhythm 3+ lower extremity edema  Abdomen soft nontender no hepatosplenomegaly  CNS intact normal sensory exam  Skin no rashes no nodules  Psych oriented to time place and person normal memory  Musculoskeletal no cyanosis no clubbing normal range of motion        LABS:  Lab Results   Component Value Date    CALCIUM 9.6 12/10/2021    PHOS 5.3 (H) 12/08/2021     Results from last 7 days   Lab Units 12/10/21  0735 12/09/21  0518 12/09/21  0518 12/08/21  0828 12/08/21  0828 12/07/21  1749 12/07/21  1749   MAGNESIUM mg/dL  --   --   --   --  2.2  --  2.2   SODIUM mmol/L 137  --  138  --  136   < > 138   POTASSIUM mmol/L 4.5  --  4.3  --  4.6   < > 4.6   CHLORIDE mmol/L 88*  --  92*  --  98   < > 98   CO2 mmol/L 40.5*  --  39.3*  --  34.1*   < > 30.7*   BUN mg/dL 11  --  11  --  14   < > 14   CREATININE mg/dL 0.91  --  0.84  --  0.95   < > 1.01   GLUCOSE mg/dL 96   < > 123*   < > 148*   < > 86   CALCIUM mg/dL 9.6  --  9.4  --  9.3   < > 9.1   WBC 10*3/mm3 6.81  --  7.34  --  6.54   < > 6.45   HEMOGLOBIN g/dL 15.2  --  15.1  --  14.7   < > 14.9   PLATELETS 10*3/mm3 292  --  296  --  292   < > 281   ALT (SGPT) U/L 41  --  51*  --   --   --  60*   AST (SGOT) U/L 46*  --  47*  --   --   --  60*   PROBNP pg/mL  --   --   --   --   --   --  1,374.0*    < > = values in this interval not displayed.     Lab Results   Component Value Date    TROPONINT 0.018 12/10/2021     Results from last 7 days   Lab Units 12/10/21  0735 12/07/21  1749   TROPONIN T ng/mL 0.018 0.011             Results from last 7 days   Lab Units 12/10/21  1507   PH, ARTERIAL pH units 7.323*   PCO2, ARTERIAL mm Hg 96.0*   PO2 ART mm Hg 101.0*   FLOW  RATE lpm 7   MODALITY  HFNC   O2 SATURATION CALC % 96.5                 Lab Results   Component Value Date    TSH 3.040 12/07/2021     Estimated Creatinine Clearance: 175.5 mL/min (by C-G formula based on SCr of 0.91 mg/dL).  Results from last 7 days   Lab Units 12/07/21  1751   NITRITE UA  Negative   WBC UA /HPF 6-12*   BACTERIA UA /HPF None Seen   SQUAM EPITHEL UA /HPF 0-2        Imaging: I personally visualized the images of scans/x-rays performed within last 3 days.      Assessment:  Anasarca  Suspected DEJAN  Morbid obesity  Tobacco abuse  Hypertension  Cor pulmonale suspect pulmonary hypertension      Recommendations:  At this point we have a gentleman with clinical signs and symptoms and airway anatomy consistent with possible sleep disordered breathing with possible obesity hypoventilation syndrome and questionable COPD.  ABGs actually reflect hypercapnia suggestive of obesity hypoventilation syndrome together with DEJAN  Educated patient extensively on DEJAN.  Discussed pathophysiology consequence of untreated sleep apnea.  I suspect his pulmonary hypertension/acute cor pulmonale likely due to untreated sleep apnea also  I would recommend empiric auto BiPAP tonight and see if he can tolerate it better  Use of minimal oxygen and avoid hyperoxia to keep sats above 90%.  Continue weaning down oxygen aggressively to keep sats above 90%  Agree with aggressive diuresis to optimize volume status and treat volume overload  Long-term weight loss is beneficial.  I suspect with diuresis his oxygenation will improve  Advised patient to quit smoking long-term  He will need outpatient sleep study and follow-up for his DEJAN                Corby Duval MD  12/10/2021  17:08 EST      Much of this encounter note is an electronic transcription/translation of spoken language to printed text using Dragon Software.

## 2021-12-11 ENCOUNTER — APPOINTMENT (OUTPATIENT)
Dept: CARDIOLOGY | Facility: HOSPITAL | Age: 41
End: 2021-12-11

## 2021-12-11 ENCOUNTER — INPATIENT HOSPITAL (OUTPATIENT)
Dept: URBAN - METROPOLITAN AREA HOSPITAL 113 | Facility: HOSPITAL | Age: 41
End: 2021-12-11

## 2021-12-11 DIAGNOSIS — R60.1 GENERALIZED EDEMA: ICD-10-CM

## 2021-12-11 DIAGNOSIS — Z72.0 TOBACCO USE: ICD-10-CM

## 2021-12-11 DIAGNOSIS — R93.2 ABNORMAL FINDINGS ON DIAGNOSTIC IMAGING OF LIVER AND BILIARY: ICD-10-CM

## 2021-12-11 DIAGNOSIS — J96.00 ACUTE RESPIRATORY FAILURE, UNSPECIFIED WHETHER WITH HYPOXIA: ICD-10-CM

## 2021-12-11 DIAGNOSIS — E66.9 OBESITY, UNSPECIFIED: ICD-10-CM

## 2021-12-11 PROBLEM — I27.20 PULMONARY HYPERTENSION: Status: ACTIVE | Noted: 2021-12-11

## 2021-12-11 PROBLEM — R33.8 ACUTE URINARY RETENTION: Status: RESOLVED | Noted: 2021-12-07 | Resolved: 2021-12-11

## 2021-12-11 LAB
ALBUMIN SERPL-MCNC: 3.2 G/DL (ref 3.5–5.2)
ALBUMIN/GLOB SERPL: 0.6 G/DL
ALP SERPL-CCNC: 91 U/L (ref 39–117)
ALT SERPL W P-5'-P-CCNC: 34 U/L (ref 1–41)
ANION GAP SERPL CALCULATED.3IONS-SCNC: 10 MMOL/L (ref 5–15)
AST SERPL-CCNC: 34 U/L (ref 1–40)
BASOPHILS # BLD AUTO: 0.03 10*3/MM3 (ref 0–0.2)
BASOPHILS NFR BLD AUTO: 0.5 % (ref 0–1.5)
BH CV LOWER VASCULAR LEFT COMMON FEMORAL AUGMENT: NORMAL
BH CV LOWER VASCULAR LEFT COMMON FEMORAL COMPETENT: NORMAL
BH CV LOWER VASCULAR LEFT COMMON FEMORAL COMPRESS: NORMAL
BH CV LOWER VASCULAR LEFT COMMON FEMORAL PHASIC: NORMAL
BH CV LOWER VASCULAR LEFT COMMON FEMORAL SPONT: NORMAL
BH CV LOWER VASCULAR LEFT DISTAL FEMORAL COMPRESS: NORMAL
BH CV LOWER VASCULAR LEFT GASTRONEMIUS COMPRESS: NORMAL
BH CV LOWER VASCULAR LEFT GREATER SAPH AK COMPRESS: NORMAL
BH CV LOWER VASCULAR LEFT GREATER SAPH BK COMPRESS: NORMAL
BH CV LOWER VASCULAR LEFT LESSER SAPH COMPRESS: NORMAL
BH CV LOWER VASCULAR LEFT MID FEMORAL AUGMENT: NORMAL
BH CV LOWER VASCULAR LEFT MID FEMORAL COMPETENT: NORMAL
BH CV LOWER VASCULAR LEFT MID FEMORAL COMPRESS: NORMAL
BH CV LOWER VASCULAR LEFT MID FEMORAL PHASIC: NORMAL
BH CV LOWER VASCULAR LEFT MID FEMORAL SPONT: NORMAL
BH CV LOWER VASCULAR LEFT PERONEAL COMPRESS: NORMAL
BH CV LOWER VASCULAR LEFT POPLITEAL AUGMENT: NORMAL
BH CV LOWER VASCULAR LEFT POPLITEAL COMPETENT: NORMAL
BH CV LOWER VASCULAR LEFT POPLITEAL COMPRESS: NORMAL
BH CV LOWER VASCULAR LEFT POPLITEAL PHASIC: NORMAL
BH CV LOWER VASCULAR LEFT POPLITEAL SPONT: NORMAL
BH CV LOWER VASCULAR LEFT POSTERIOR TIBIAL COMPRESS: NORMAL
BH CV LOWER VASCULAR LEFT PROFUNDA FEMORAL COMPRESS: NORMAL
BH CV LOWER VASCULAR LEFT PROXIMAL FEMORAL COMPRESS: NORMAL
BH CV LOWER VASCULAR LEFT SAPHENOFEMORAL JUNCTION COMPRESS: NORMAL
BH CV LOWER VASCULAR RIGHT COMMON FEMORAL AUGMENT: NORMAL
BH CV LOWER VASCULAR RIGHT COMMON FEMORAL COMPETENT: NORMAL
BH CV LOWER VASCULAR RIGHT COMMON FEMORAL COMPRESS: NORMAL
BH CV LOWER VASCULAR RIGHT COMMON FEMORAL PHASIC: NORMAL
BH CV LOWER VASCULAR RIGHT COMMON FEMORAL SPONT: NORMAL
BH CV LOWER VASCULAR RIGHT DISTAL FEMORAL COMPRESS: NORMAL
BH CV LOWER VASCULAR RIGHT GASTRONEMIUS COMPRESS: NORMAL
BH CV LOWER VASCULAR RIGHT GREATER SAPH AK COMPRESS: NORMAL
BH CV LOWER VASCULAR RIGHT GREATER SAPH BK COMPRESS: NORMAL
BH CV LOWER VASCULAR RIGHT LESSER SAPH COMPRESS: NORMAL
BH CV LOWER VASCULAR RIGHT MID FEMORAL AUGMENT: NORMAL
BH CV LOWER VASCULAR RIGHT MID FEMORAL COMPETENT: NORMAL
BH CV LOWER VASCULAR RIGHT MID FEMORAL COMPRESS: NORMAL
BH CV LOWER VASCULAR RIGHT MID FEMORAL PHASIC: NORMAL
BH CV LOWER VASCULAR RIGHT MID FEMORAL SPONT: NORMAL
BH CV LOWER VASCULAR RIGHT PERONEAL COMPRESS: NORMAL
BH CV LOWER VASCULAR RIGHT POPLITEAL AUGMENT: NORMAL
BH CV LOWER VASCULAR RIGHT POPLITEAL COMPETENT: NORMAL
BH CV LOWER VASCULAR RIGHT POPLITEAL COMPRESS: NORMAL
BH CV LOWER VASCULAR RIGHT POPLITEAL PHASIC: NORMAL
BH CV LOWER VASCULAR RIGHT POPLITEAL SPONT: NORMAL
BH CV LOWER VASCULAR RIGHT POSTERIOR TIBIAL COMPRESS: NORMAL
BH CV LOWER VASCULAR RIGHT PROFUNDA FEMORAL COMPRESS: NORMAL
BH CV LOWER VASCULAR RIGHT PROXIMAL FEMORAL COMPRESS: NORMAL
BH CV LOWER VASCULAR RIGHT SAPHENOFEMORAL JUNCTION COMPRESS: NORMAL
BILIRUB SERPL-MCNC: 1 MG/DL (ref 0–1.2)
BUN SERPL-MCNC: 13 MG/DL (ref 6–20)
BUN/CREAT SERPL: 14.6 (ref 7–25)
CALCIUM SPEC-SCNC: 9.4 MG/DL (ref 8.6–10.5)
CANCER AG19-9 SERPL-ACNC: 20.7 U/ML
CHLORIDE SERPL-SCNC: 86 MMOL/L (ref 98–107)
CO2 SERPL-SCNC: 42 MMOL/L (ref 22–29)
CREAT SERPL-MCNC: 0.89 MG/DL (ref 0.76–1.27)
DEPRECATED RDW RBC AUTO: 46 FL (ref 37–54)
EOSINOPHIL # BLD AUTO: 0.34 10*3/MM3 (ref 0–0.4)
EOSINOPHIL NFR BLD AUTO: 5.2 % (ref 0.3–6.2)
ERYTHROCYTE [DISTWIDTH] IN BLOOD BY AUTOMATED COUNT: 17.6 % (ref 12.3–15.4)
GFR SERPL CREATININE-BSD FRML MDRD: 115 ML/MIN/1.73
GLOBULIN UR ELPH-MCNC: 5.4 GM/DL
GLUCOSE SERPL-MCNC: 96 MG/DL (ref 65–99)
HCT VFR BLD AUTO: 52.9 % (ref 37.5–51)
HGB BLD-MCNC: 15.6 G/DL (ref 13–17.7)
IGG1 SER-MCNC: 2789 MG/DL (ref 700–1600)
IMM GRANULOCYTES # BLD AUTO: 0.02 10*3/MM3 (ref 0–0.05)
IMM GRANULOCYTES NFR BLD AUTO: 0.3 % (ref 0–0.5)
INR PPP: 1.07 (ref 0.9–1.1)
LYMPHOCYTES # BLD AUTO: 0.96 10*3/MM3 (ref 0.7–3.1)
LYMPHOCYTES NFR BLD AUTO: 14.6 % (ref 19.6–45.3)
MAXIMAL PREDICTED HEART RATE: 180 BPM
MCH RBC QN AUTO: 23.8 PG (ref 26.6–33)
MCHC RBC AUTO-ENTMCNC: 29.5 G/DL (ref 31.5–35.7)
MCV RBC AUTO: 80.8 FL (ref 79–97)
MONOCYTES # BLD AUTO: 0.71 10*3/MM3 (ref 0.1–0.9)
MONOCYTES NFR BLD AUTO: 10.8 % (ref 5–12)
NEUTROPHILS NFR BLD AUTO: 4.52 10*3/MM3 (ref 1.7–7)
NEUTROPHILS NFR BLD AUTO: 68.6 % (ref 42.7–76)
NRBC BLD AUTO-RTO: 0.3 /100 WBC (ref 0–0.2)
PLATELET # BLD AUTO: 265 10*3/MM3 (ref 140–450)
PMV BLD AUTO: 10.6 FL (ref 6–12)
POTASSIUM SERPL-SCNC: 3.9 MMOL/L (ref 3.5–5.2)
PROT SERPL-MCNC: 8.6 G/DL (ref 6–8.5)
PROTHROMBIN TIME: 13.7 SECONDS (ref 11.7–14.2)
RBC # BLD AUTO: 6.55 10*6/MM3 (ref 4.14–5.8)
SODIUM SERPL-SCNC: 138 MMOL/L (ref 136–145)
STRESS TARGET HR: 153 BPM
WBC NRBC COR # BLD: 6.58 10*3/MM3 (ref 3.4–10.8)

## 2021-12-11 PROCEDURE — 85025 COMPLETE CBC W/AUTO DIFF WBC: CPT | Performed by: STUDENT IN AN ORGANIZED HEALTH CARE EDUCATION/TRAINING PROGRAM

## 2021-12-11 PROCEDURE — 85610 PROTHROMBIN TIME: CPT | Performed by: HOSPITALIST

## 2021-12-11 PROCEDURE — 99222 1ST HOSP IP/OBS MODERATE 55: CPT | Performed by: NURSE PRACTITIONER

## 2021-12-11 PROCEDURE — 82784 ASSAY IGA/IGD/IGG/IGM EACH: CPT | Performed by: INTERNAL MEDICINE

## 2021-12-11 PROCEDURE — 99232 SBSQ HOSP IP/OBS MODERATE 35: CPT | Performed by: NURSE PRACTITIONER

## 2021-12-11 PROCEDURE — 80053 COMPREHEN METABOLIC PANEL: CPT | Performed by: INTERNAL MEDICINE

## 2021-12-11 PROCEDURE — 86301 IMMUNOASSAY TUMOR CA 19-9: CPT | Performed by: HOSPITALIST

## 2021-12-11 PROCEDURE — 93970 EXTREMITY STUDY: CPT

## 2021-12-11 PROCEDURE — 25010000002 FUROSEMIDE PER 20 MG: Performed by: INTERNAL MEDICINE

## 2021-12-11 RX ORDER — FUROSEMIDE 40 MG/1
40 TABLET ORAL
Status: DISCONTINUED | OUTPATIENT
Start: 2021-12-11 | End: 2021-12-13 | Stop reason: HOSPADM

## 2021-12-11 RX ORDER — ZOLPIDEM TARTRATE 5 MG/1
TABLET ORAL
Qty: 2 TABLET | Refills: 0 | Status: SHIPPED | OUTPATIENT
Start: 2021-12-11 | End: 2021-12-13

## 2021-12-11 RX ORDER — POTASSIUM CHLORIDE 750 MG/1
20 TABLET, FILM COATED, EXTENDED RELEASE ORAL DAILY
Status: DISCONTINUED | OUTPATIENT
Start: 2021-12-11 | End: 2021-12-13 | Stop reason: HOSPADM

## 2021-12-11 RX ADMIN — METOPROLOL TARTRATE 25 MG: 25 TABLET, FILM COATED ORAL at 21:29

## 2021-12-11 RX ADMIN — FUROSEMIDE 60 MG: 10 INJECTION, SOLUTION INTRAMUSCULAR; INTRAVENOUS at 02:41

## 2021-12-11 RX ADMIN — FUROSEMIDE 40 MG: 40 TABLET ORAL at 17:30

## 2021-12-11 RX ADMIN — LISINOPRIL 20 MG: 20 TABLET ORAL at 09:35

## 2021-12-11 RX ADMIN — POTASSIUM CHLORIDE 20 MEQ: 750 TABLET, EXTENDED RELEASE ORAL at 17:30

## 2021-12-11 RX ADMIN — FUROSEMIDE 60 MG: 10 INJECTION, SOLUTION INTRAMUSCULAR; INTRAVENOUS at 09:35

## 2021-12-11 RX ADMIN — Medication 5 MG: at 21:29

## 2021-12-11 RX ADMIN — LISINOPRIL 20 MG: 20 TABLET ORAL at 21:30

## 2021-12-11 NOTE — PROGRESS NOTES
[facility]  [phone]  Tripp Macdonald MD    Subjective:  LOS: 4    Chief Complaint: Sleep apnea    Patient is awake alert today.  Did not use his noninvasive device last night.  Was on low-flow oxygen when I walked in but tolerating room air.  States much improved with diuresis.  Keen to go home.    Objective   Vital Signs past 24hrs    Temp range: Temp (24hrs), Av.3 °F (36.8 °C), Min:98 °F (36.7 °C), Max:99.1 °F (37.3 °C)    BP range: BP: (101-131)/(63-81) 106/74  Pulse range: Heart Rate:  [105-123] 123  Resp rate range: Resp:  [16-20] 16    Device (Oxygen Therapy): nasal cannulaFlow (L/min):  [3-10] 3  Oxygen range:SpO2:  [79 %-98 %] 98 %      (!) 170 kg (375 lb); Body mass index is 50.86 kg/m².    Intake/Output Summary (Last 24 hours) at 2021 1247  Last data filed at 2021 0900  Gross per 24 hour   Intake 720 ml   Output 3000 ml   Net -2280 ml       Physical Exam  Constitutional:       Appearance: He is obese.   Eyes:      Pupils: Pupils are equal, round, and reactive to light.   Cardiovascular:      Rate and Rhythm: Normal rate and regular rhythm.      Heart sounds: No murmur heard.      Pulmonary:      Effort: Pulmonary effort is normal.      Breath sounds: Decreased breath sounds present.   Abdominal:      General: Bowel sounds are normal.      Palpations: Abdomen is soft. There is no mass.      Tenderness: There is no abdominal tenderness.   Musculoskeletal:         General: Swelling (mild chronic) present.   Neurological:      Mental Status: He is alert.       Results Review:    I have reviewed the laboratory and imaging data since the last note by [facility] physician.  My annotations are noted in assessment and plan.    Results from last 7 days   Lab Units 12/10/21  1550   D DIMER QUANT MCGFEU/mL 2.61*       Medication Review:  I have reviewed the current MAR.  My annotations are noted in assessment and plan.       Plan   PCCM Problems  Fluid overload/acute diastolic heart failure/RV  dysfunction  Acute hypoxic respiratory failure, improved  Suspected sleep apnea  Current cigarette smoker  Morbid obesity  Liver mass pending further work-up    THESE ARE NEW MEDICAL PROBLEMS TO ME.    Plan of Treatment    Did not use his noninvasive device last night.  States it sucked the air out of him.  Patient would benefit from positive pressure treatment but not interested at this time.  Recommend outpatient sleep study and consideration of treatment if patient allows.    Acute hypoxic respiratory failure appears improved.  Can probably do without supplemental oxygen.    Recommended patient stop smoking.    Fluid overload improved.  Additional diuretics per cardiology team.    Liver mass work-up ongoing.    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  Recommend follow-up echo after adequate diuresis and as an outpatient.    No objections to discharge per pulmonary standpoint.  Patient will be scheduled for outpatient sleep study.    Tripp Macdonald MD  12/11/21  12:47 EST    While in the room and during my examination of the patient I wore gloves, gown, mask, eye protection and followed enhanced droplet/contact isolation protocol and precautions.  I washed my hands before and after this patient encounter.    Part of this note may be an electronic transcription/translation of spoken language to printed text using the Dragon Dictation System.

## 2021-12-11 NOTE — PLAN OF CARE
Problem: Adult Inpatient Plan of Care  Goal: Plan of Care Review  Outcome: Ongoing, Progressing  Flowsheets (Taken 12/11/2021 0807)  Progress: no change  Plan of Care Reviewed With: patient  Outcome Summary:   Patient OX4 and alert. Patient received IV Lasix over night   increased urin output. Patient on 2L NC. Patient vss   will continue to monitor.  Goal: Patient-Specific Goal (Individualized)  Outcome: Ongoing, Progressing  Goal: Absence of Hospital-Acquired Illness or Injury  Outcome: Ongoing, Progressing  Intervention: Identify and Manage Fall Risk  Recent Flowsheet Documentation  Taken 12/11/2021 0600 by Arleen Becerra, RN  Safety Promotion/Fall Prevention:   activity supervised   clutter free environment maintained   gait belt   nonskid shoes/slippers when out of bed   room organization consistent   safety round/check completed  Taken 12/11/2021 0400 by Arleen Becerra, RN  Safety Promotion/Fall Prevention:   clutter free environment maintained   activity supervised   gait belt   nonskid shoes/slippers when out of bed   room organization consistent   safety round/check completed  Taken 12/11/2021 0200 by Arleen Becerra, RN  Safety Promotion/Fall Prevention:   activity supervised   clutter free environment maintained   gait belt   nonskid shoes/slippers when out of bed   room organization consistent   safety round/check completed  Taken 12/11/2021 0000 by Arleen Becerra, RN  Safety Promotion/Fall Prevention:   activity supervised   clutter free environment maintained   gait belt   nonskid shoes/slippers when out of bed   room organization consistent   safety round/check completed  Taken 12/10/2021 2200 by Arleen Becerra, RN  Safety Promotion/Fall Prevention:   activity supervised   clutter free environment maintained   gait belt   nonskid shoes/slippers when out of bed   room organization consistent   safety round/check completed  Taken 12/10/2021 2000 by Arleen Becerra, RN  Safety Promotion/Fall  Prevention:   activity supervised   clutter free environment maintained   gait belt   nonskid shoes/slippers when out of bed   safety round/check completed   room organization consistent  Intervention: Prevent Skin Injury  Recent Flowsheet Documentation  Taken 12/11/2021 0600 by Arleen Becerra RN  Body Position: position changed independently  Taken 12/11/2021 0400 by Arleen Becerra RN  Body Position: position changed independently  Taken 12/11/2021 0200 by Arleen Becerra RN  Body Position: position changed independently  Taken 12/11/2021 0000 by Arleen Becerra RN  Body Position: position changed independently  Taken 12/10/2021 2200 by Arleen Becerra RN  Body Position: position changed independently  Taken 12/10/2021 2000 by Arleen Becerra RN  Body Position: position changed independently  Intervention: Prevent Infection  Recent Flowsheet Documentation  Taken 12/11/2021 0600 by Arleen Becerra RN  Infection Prevention:   rest/sleep promoted   single patient room provided   hand hygiene promoted  Taken 12/11/2021 0400 by Arleen Becerra RN  Infection Prevention:   rest/sleep promoted   single patient room provided   hand hygiene promoted  Taken 12/11/2021 0200 by Arleen Becerra RN  Infection Prevention:   rest/sleep promoted   single patient room provided   hand hygiene promoted  Taken 12/11/2021 0000 by Arleen Becerra RN  Infection Prevention:   rest/sleep promoted   single patient room provided   hand hygiene promoted  Taken 12/10/2021 2200 by Arleen Becerra RN  Infection Prevention:   rest/sleep promoted   single patient room provided   hand hygiene promoted  Taken 12/10/2021 2000 by Arleen Becerra RN  Infection Prevention:   hand hygiene promoted   single patient room provided   rest/sleep promoted  Goal: Optimal Comfort and Wellbeing  Outcome: Ongoing, Progressing  Intervention: Provide Person-Centered Care  Recent Flowsheet Documentation  Taken 12/11/2021 0200 by Mariam  ASHA Bacon  Trust Relationship/Rapport: care explained  Taken 12/10/2021 2000 by Arleen Becerra RN  Trust Relationship/Rapport: care explained  Goal: Readiness for Transition of Care  Outcome: Ongoing, Progressing     Problem: Skin Injury Risk Increased  Goal: Skin Health and Integrity  Outcome: Ongoing, Progressing  Intervention: Optimize Skin Protection  Recent Flowsheet Documentation  Taken 12/11/2021 0600 by Arleen Becerra RN  Head of Bed (HOB): HOB elevated  Taken 12/11/2021 0400 by Arleen Becerra RN  Head of Bed (HOB): HOB at 30-45 degrees  Taken 12/11/2021 0200 by Arleen Becerra RN  Head of Bed (HOB): HOB at 30-45 degrees  Taken 12/11/2021 0000 by Arleen Becerra RN  Head of Bed (HOB): HOB at 20-30 degrees  Taken 12/10/2021 2200 by Arleen Becerra RN  Head of Bed (HOB): HOB at 20-30 degrees  Taken 12/10/2021 2000 by Arleen Becerra RN  Head of Bed (HOB): HOB at 20-30 degrees     Problem: Fluid Volume Excess  Goal: Fluid Balance  Outcome: Ongoing, Progressing   Goal Outcome Evaluation:  Plan of Care Reviewed With: patient        Progress: no change  Outcome Summary: Patient OX4 and alert. Patient received IV Lasix over night; increased urin output. Patient on 2L NC. Patient vss; will continue to monitor.

## 2021-12-11 NOTE — PROGRESS NOTES
"  Name: Richard Rodrigez ADMIT: 2021   : 1980  PCP: Provider, No Known    MRN: [id number] LOS: 4 days   AGE/SEX: 41 y.o. male  ROOM: E565/     Subjective   Subjective    Difficulty voiding since Tilley catheter removed.  He feels scrotal swelling \"90% better.\"  Swelling in legs has gone down quite as well.  Breathing okay.  Did not tolerate BiPAP last evening.    Review of Systems     Objective   Objective   Vital Signs  Temp:  [98 °F (36.7 °C)-99.1 °F (37.3 °C)] 98.1 °F (36.7 °C)  Heart Rate:  [105-123] 123  Resp:  [16-20] 16  BP: (101-131)/(63-81) 106/74  SpO2:  [79 %-98 %] 98 %  on  Flow (L/min):  [3-10] 3;   Device (Oxygen Therapy): nasal cannula  Body mass index is 50.86 kg/m².    Intake/Output Summary (Last 24 hours) at 2021 1245  Last data filed at 2021 0900  Gross per 24 hour   Intake 720 ml   Output 3000 ml   Net -2280 ml     Wt Readings from Last 1 Encounters:   21 1031 (!) 170 kg (375 lb)   21 0822 (!) 170 kg (375 lb)   21 0026 (!) 170 kg (375 lb)   21 0024 (!) 170 kg (375 lb)   21 2237 (!) 170 kg (375 lb)     Net IO Since Admission: -22,165 mL [21 1338]     Physical Exam  Vitals and nursing note reviewed.   Constitutional:       General: He is not in acute distress.     Appearance: Normal appearance. He is obese.   Neck:      Vascular: No JVD.      Trachea: No tracheal deviation.   Cardiovascular:      Rate and Rhythm: Normal rate and regular rhythm.      Heart sounds: Normal heart sounds.   Pulmonary:      Effort: Pulmonary effort is normal. No respiratory distress.      Breath sounds: Normal breath sounds.   Abdominal:      General: Bowel sounds are normal.      Palpations: Abdomen is soft.      Tenderness: There is no abdominal tenderness.   Musculoskeletal:      Right lower leg: Edema present.      Left lower leg: Edema present.   Skin:     General: Skin is warm and dry.   Neurological:      General: No focal deficit present.      Mental " Status: He is alert and oriented to person, place, and time.   Psychiatric:         Behavior: Behavior normal. Behavior is cooperative.         Results Review     I reviewed the patient's new clinical results.  Results from last 7 days   Lab Units 12/11/21  0748 12/10/21  0735 12/09/21  0518 12/08/21  0828   WBC 10*3/mm3 6.58 6.81 7.34 6.54   HEMOGLOBIN g/dL 15.6 15.2 15.1 14.7   PLATELETS 10*3/mm3 265 292 296 292     Results from last 7 days   Lab Units 12/11/21  0748 12/10/21  0735 12/09/21 0518 12/08/21 0828   SODIUM mmol/L 138 137 138 136   POTASSIUM mmol/L 3.9 4.5 4.3 4.6   CHLORIDE mmol/L 86* 88* 92* 98   CO2 mmol/L 42.0* 40.5* 39.3* 34.1*   BUN mg/dL 13 11 11 14   CREATININE mg/dL 0.89 0.91 0.84 0.95   GLUCOSE mg/dL 96 96 123* 148*   EGFR IF AFRICN AM mL/min/1.73 115 112 123 106     Results from last 7 days   Lab Units 12/11/21  0748 12/10/21  0735 12/09/21 0518 12/07/21  1749   ALBUMIN g/dL 3.20* 3.10* 3.30* 3.70   BILIRUBIN mg/dL 1.0 0.7 0.6 0.6   ALK PHOS U/L 91 92 99 91   AST (SGOT) U/L 34 46* 47* 60*   ALT (SGPT) U/L 34 41 51* 60*     Results from last 7 days   Lab Units 12/11/21  0748 12/10/21  0735 12/09/21  0518 12/08/21  0828 12/07/21  1749 12/07/21  1749   CALCIUM mg/dL 9.4 9.6 9.4 9.3   < > 9.1   ALBUMIN g/dL 3.20* 3.10* 3.30*  --   --  3.70   MAGNESIUM mg/dL  --   --   --  2.2  --  2.2   PHOSPHORUS mg/dL  --   --   --  5.3*  --   --     < > = values in this interval not displayed.   )    Results from last 7 days   Lab Units 12/07/21  1749   TRIGLYCERIDES mg/dL 81     Results from last 7 days   Lab Units 12/11/21  0748   PROTIME Seconds 13.7   INR  1.07     Results from last 7 days   Lab Units 12/07/21  1748   COVID19  Not Detected     Scheduled Medications  furosemide, 60 mg, Intravenous, Q8H  lisinopril, 20 mg, Oral, Q12H    Infusions   Diet  Diet Regular; Thin; Low Sodium; 2,000 mg Na         Assessment/Plan     Active Hospital Problems    Diagnosis  POA   • **Anasarca [R60.1]  Yes   •  Pulmonary hypertension (HCC) [I27.20]  Yes   • Suspected sleep apnea [R29.818]  Yes   • Acute respiratory failure with hypoxia and hypercapnia (HCC) [J96.01, J96.02]  Yes   • Obesity hypoventilation syndrome (HCC) [E66.2]  Yes   • Hepatic steatosis [K76.0]  Yes   • Morbid obesity with BMI of 50.0-59.9, adult (HCC) [E66.01, Z68.43]  Not Applicable   • Tobacco use disorder [F17.200]  Yes   • Hypertension [I10]  Yes      Resolved Hospital Problems    Diagnosis Date Resolved POA   • Acute urinary retention [R33.8] 12/11/2021 Yes       41 y.o. male with Anasarca.    Patient overall feels much better.  Unfortunately did not tolerate Pap device last evening.  Will need outpatient sleep study.  Discussed with patient the importance of weight loss.  Volume excess seems much improved.  According to epic he has diuresed over 22 L of fluid since admission but interestingly his weight is exactly the same.  Discussed with nurse about obtaining a new weight today.  In anticipation of discharge soon (for CTA) will transition him to oral furosemide.  Cardiology is yet to see patient today and suggested yesterday they wanted CT angiogram to rule out pulmonary embolus if D-dimer elevated which it is in fact elevated.  He just had contrasted CT last evening.  May need to wait till a.m.  Will get venous Doppler lower extremities in the meantime.  Pulmonology note reviewed.  Okay with him to discharge and follow-up as outpatient.  Will need follow-up with cardiology as well.  Appreciate GI assistance.  Patient would like to pursue MRI of his liver as outpatient.  Unlikely this questionable mass related to his presentation.  Question of cirrhosis however remains.  Consider biopsy.  PT/INR normal.      · Pharmacy to dose Lovenox for DVT prophylaxis.   · Full code.  · Discussed with patient and nursing staff.  · Anticipate discharge home when cleared by consultants.      Abrahan Godwin MD  [facility]  12/11/21  12:45  EST

## 2021-12-11 NOTE — PROGRESS NOTES
Hospital Follow Up    LOS:  LOS: 4 days   Patient Name: Richard Rodrigez  Age/Sex: 41 y.o. male  : 1980  MRN: [id number]    Day of Service: 21   Length of Stay: 4  Encounter Provider: QUIQUE Rivera  Place of Service: [facility] CARDIOLOGY  Patient Care Team:  Provider, No Known as PCP - General    Subjective:     Chief Complaint: Tachycardia, elevated D-dimer, edema pancreatic mass    Interval History: No shortness of breath but nurse states that he looks short of breath when he gets up and walks around    Objective:     Objective:  Temp:  [97.9 °F (36.6 °C)-99.1 °F (37.3 °C)] 97.9 °F (36.6 °C)  Heart Rate:  [105-123] 121  Resp:  [16-20] 16  BP: ()/(63-81) 94/70     Intake/Output Summary (Last 24 hours) at 2021 1604  Last data filed at 2021 1300  Gross per 24 hour   Intake 720 ml   Output 500 ml   Net 220 ml     Body mass index is 46.44 kg/m².      21  0822 21  1031 21  1420   Weight: (!) 170 kg (375 lb) (!) 170 kg (375 lb) (!) 155 kg (342 lb 6.4 oz)     Weight change:     Physical Exam:   General Appearance:    Awake alert and oriented in no acute distress.   Color:  Skin:  Neuro:  HEENT:    Lungs:     Pink  Warm and dry  No focal, motor or sensory deficits  Neck supple, pupils equal, round and reactive. No JVD, No Bruit  Diminished in the bases to auscultation,respirations regular, even and                  unlabored    Heart:    Regular rate and rhythm, S1 and S2, no murmur, + gallop, no rub. 1+ edema, DP/PT pulses are 2+   Chest Wall:    No abnormalities observed   Abdomen:     Normal bowel sounds, no masses, no organomegaly, soft        non-tender, non-distended, no guarding, no ascites noted   Extremities:   Moves all extremities well, no edema, no cyanosis, no redness       Lab Review:   Results from last 7 days   Lab Units 21  0748 12/10/21  0735   SODIUM mmol/L 138 137   POTASSIUM mmol/L 3.9 4.5   CHLORIDE mmol/L  86* 88*   CO2 mmol/L 42.0* 40.5*   BUN mg/dL 13 11   CREATININE mg/dL 0.89 0.91   GLUCOSE mg/dL 96 96   CALCIUM mg/dL 9.4 9.6   AST (SGOT) U/L 34 46*   ALT (SGPT) U/L 34 41     Results from last 7 days   Lab Units 12/10/21  0735 12/07/21  1749   TROPONIN T ng/mL 0.018 0.011     Results from last 7 days   Lab Units 12/11/21  0748 12/10/21  0735   WBC 10*3/mm3 6.58 6.81   HEMOGLOBIN g/dL 15.6 15.2   HEMATOCRIT % 52.9* 51.1*   PLATELETS 10*3/mm3 265 292     Results from last 7 days   Lab Units 12/11/21  0748   INR  1.07     Results from last 7 days   Lab Units 12/08/21  0828 12/07/21  1749   MAGNESIUM mg/dL 2.2 2.2     Results from last 7 days   Lab Units 12/07/21  1749   CHOLESTEROL mg/dL 133   TRIGLYCERIDES mg/dL 81   HDL CHOL mg/dL 40     Results from last 7 days   Lab Units 12/07/21  1749   PROBNP pg/mL 1,374.0*     Results from last 7 days   Lab Units 12/07/21  1749   TSH uIU/mL 3.040     I reviewed the patient's new clinical results.  I personally viewed and interpreted the patient's EKG  Current Medications:   Scheduled Meds:furosemide, 40 mg, Oral, BID  lisinopril, 20 mg, Oral, Q12H  potassium chloride, 20 mEq, Oral, Daily      Continuous Infusions:     Allergies:  No Known Allergies    Assessment:       Anasarca    Morbid obesity with BMI of 50.0-59.9, adult (HCC)    Tobacco use disorder    Hypertension    Hepatic steatosis    Suspected sleep apnea    Acute respiratory failure with hypoxia and hypercapnia (HCC)    Obesity hypoventilation syndrome (HCC)    Pulmonary hypertension (HCC)    1. Acute edema/anasarca,   2. RV dysfunction  3. Sinus tachycardia  4. D-dimer rule out pulmonary embolus  5. Pancreatic mass  6. Acute hypoxic respiratory failure    Plan:         D-dimer drawn yesterday is elevated. I am ordering a CT of the chest to rule out PE. I have a high suspicion of this because he is tachycardic and was short of breath as well as some RV dysfunction on his echocardiogram. This could be possible in the  setting of mass in the pancreas. I am also can add low-dose beta-blocker for his heart rate.  QUIQUE Rivera  12/11/21  16:04 EST  Electronically signed by QUIQUE Rivera, 12/11/21, 4:04 PM EST.

## 2021-12-11 NOTE — CONSULTS
GI CONSULT  NOTE:    Referring Provider: Dr. Godwin    Chief complaint: Possible cirrhosis and liver mass    Subjective .     History of present illness: Richard Rodrigez is a 41 y.o. male with no significant medical history who presented 4 days ago with complaint of penile swelling and difficulty urinating.  He was noted to have anasarca with increased leg swelling abdominal distention.  He is feeling better since having aggressive diuresis.  CT of the abdomen concerning for possible primary liver mass and cirrhosis.  He denies any personal or family history of liver disease.  He overall feels better today.  O2 sats are 89% on room air.  Pulmonary is following for suspected DEJAN and pulmonary hypertension.  He is eating without difficulty.  No abdominal pain, nausea, vomiting, or other GI complaints.  He drinks alcohol occasionally and smokes tobacco daily.     Labs reviewed -CBC and CMP unremarkable, AFP and CA 19-9 normal.    12/10/2021 CT AP W/Wo -pancreas appears normal, however there is an adjacent mass which relates to prominent enlargement of the caudate lobe of the liver which can be associated with cirrhosis.  Ill-defined vaguely low-density lesion in the dome of the liver measuring 3.5 x 4.6 cm (concern for primary liver tumor such as hepatocellular carcinoma)      Endo History:  None    Past Medical History:  History reviewed. No pertinent past medical history.    Past Surgical History:  Past Surgical History:   Procedure Laterality Date   • FRACTURE SURGERY         Social History:  Social History     Tobacco Use   • Smoking status: Current Every Day Smoker     Types: Cigars   • Smokeless tobacco: Not on file   Substance Use Topics   • Alcohol use: Yes     Alcohol/week: 2.0 standard drinks     Types: 2 Shots of liquor per week   • Drug use: Never       Family History:  History reviewed. No pertinent family history.    Medications:  No medications prior to admission.       Scheduled Meds:furosemide, 60 mg,  Intravenous, Q8H  lisinopril, 20 mg, Oral, Q12H      Continuous Infusions:   PRN Meds:.•  acetaminophen **OR** [DISCONTINUED] acetaminophen **OR** [DISCONTINUED] acetaminophen  •  melatonin  •  nitroglycerin  •  ondansetron    ALLERGIES:  Patient has no known allergies.    Review of Systems:  Review of Systems   Constitutional: Negative.    HENT: Negative.    Gastrointestinal: Positive for abdominal distention. Negative for abdominal pain, constipation, diarrhea, nausea and vomiting.   Genitourinary: Positive for penile swelling.   Musculoskeletal: Negative.    Neurological: Negative.    Psychiatric/Behavioral: Negative.          Objective     Vital Signs:   Vitals:    12/10/21 2108 12/10/21 2258 12/10/21 2355 12/11/21 0700   BP:   111/81 131/76   BP Location:   Left arm Left arm   Patient Position:   Lying Lying   Pulse: 114 115 105 115   Resp:   20 18   Temp:   98 °F (36.7 °C) 98 °F (36.7 °C)   TempSrc:   Oral Oral   SpO2: (!) 89% 91% 92% 91%   Weight:       Height:           Physical Exam: Resting in bed    General Appearance:    Awake and alert, in no acute distress   Head:    Normocephalic, without obvious abnormality   Throat:   No oral lesions, no thrush, oral mucosa moist   Lungs:     Respirations regular, even and unlabored   Chest Wall:    No abnormalities observed   Abdomen:     Soft, non-tender, distended, no rebound or guarding, no hepatosplenomegaly   Rectal:     Deferred   Extremities:   Moves all extremities, no edema, no cyanosis   Pulses:   Pulses palpable and equal bilaterally   Skin:   No bruising, no rash, no jaundice, normal palpation   Lymph nodes:   No cervical, supraclavicular or submandibular palpable adenopathy   Neurologic:   No asterixis       Results Review:  I reviewed the patient's labs and imaging.     CBC  Results from last 7 days   Lab Units 12/11/21  0748 12/10/21  0735 12/09/21  0518 12/08/21  0828 12/07/21  1749   RBC 10*6/mm3 6.55* 6.30* 6.32* 6.11* 6.14*   WBC 10*3/mm3 6.58  6.81 7.34 6.54 6.45   HEMOGLOBIN g/dL 15.6 15.2 15.1 14.7 14.9   PLATELETS 10*3/mm3 265 292 296 292 281       CMP  Results from last 7 days   Lab Units 12/11/21  0748 12/10/21  0735 12/09/21  0518 12/08/21  0828 12/07/21  1749   SODIUM mmol/L 138 137 138 136 138   POTASSIUM mmol/L 3.9 4.5 4.3 4.6 4.6   CHLORIDE mmol/L 86* 88* 92* 98 98   CO2 mmol/L 42.0* 40.5* 39.3* 34.1* 30.7*   BUN mg/dL 13 11 11 14 14   CREATININE mg/dL 0.89 0.91 0.84 0.95 1.01   GLUCOSE mg/dL 96 96 123* 148* 86   ALBUMIN g/dL 3.20* 3.10* 3.30*  --  3.70   BILIRUBIN mg/dL 1.0 0.7 0.6  --  0.6   ALK PHOS U/L 91 92 99  --  91   AST (SGOT) U/L 34 46* 47*  --  60*   ALT (SGPT) U/L 34 41 51*  --  60*       Amylase and Lipase        CRP         Imaging Results (Last 24 Hours)     Procedure Component Value Units Date/Time    CT Abdomen With & Without Contrast [[id number]] Collected: 12/10/21 1850     Updated: 12/10/21 2033    Narrative:      CT SCAN OF THE ABDOMEN WITHOUT AND WITH INTRAVENOUS CONTRAST WITH  PANCREATIC MASS PROTOCOL     HISTORY: Possible pancreatic mass noted on ultrasound. Patient has  history of alcohol induced hepatitis. Ascites.     TECHNIQUE: The CT scan was performed through the abdomen without and  with intravenous contrast with pancreatic mass protocol and thin  sections through the pancreas.      FINDINGS: The following findings are present:  1. The pancreas appears normal. However there is an adjacent mass which  relates to prominent enlargement of the caudate lobe of the liver which  can often be associated with cirrhosis.  2. In addition, there is a somewhat ill-defined vaguely low density  lesion in the dome of the liver measuring up to 3.5 x 4.6 cm. The  remainder of the liver has a somewhat heterogeneous texture and the  findings do raise the concern of a primary liver tumor such as  hepatocellular carcinoma. Further evaluation with MRI scan and possible  CT-guided biopsy is therefore recommended.  3. The spleen is not  enlarged. There is no portal vein thrombosis. The  gallbladder shows no definite gallstones and there is no wall  thickening. The adrenal glands and both kidneys are unremarkable.  4. There is no aortic aneurysm or retroperitoneal lymphadenopathy. The  visualized large and small bowel loops appear normal.                 Radiation dose reduction techniques were utilized, including automated  exposure control and exposure modulation based on body size.     This report was finalized on 12/10/2021 8:30 PM by Dr. Fletcher Farmer M.D.               ASSESSMENT:  41 y.o. male with no significant medical history who presented 4 days ago with complaint of penile swelling and difficulty urinating.  He was noted to have anasarca with increased leg swelling abdominal distention.  CT of the abdomen concerning for possible primary liver mass and cirrhosis.  -Abnormal CT of the liver concerning for cirrhosis and/or liver mass  -Anasarca  -Acute respiratory failure, suspected DEJAN and pulmonary hypertension  -Obesity  -Tobacco abuse      PLAN:  MRI liver mass protocol is recommended to further evaluate findings from abnormal CT.  However patient is adamant that he wants to do this as outpatient and he feels ready to be discharged.  He would be high risk for CT liver biopsy.  If he remains in the hospital we recommend MRI liver mass protocol.  If he is discharged we recommend follow-up with [facility].     We appreciate the referral    QUIQUE Cid  12/11/21  10:18 EST

## 2021-12-12 ENCOUNTER — APPOINTMENT (OUTPATIENT)
Dept: CT IMAGING | Facility: HOSPITAL | Age: 41
End: 2021-12-12

## 2021-12-12 ENCOUNTER — INPATIENT HOSPITAL (OUTPATIENT)
Dept: URBAN - METROPOLITAN AREA HOSPITAL 113 | Facility: HOSPITAL | Age: 41
End: 2021-12-12

## 2021-12-12 DIAGNOSIS — R06.02 SHORTNESS OF BREATH: ICD-10-CM

## 2021-12-12 DIAGNOSIS — J96.00 ACUTE RESPIRATORY FAILURE, UNSPECIFIED WHETHER WITH HYPOXIA: ICD-10-CM

## 2021-12-12 DIAGNOSIS — R60.1 GENERALIZED EDEMA: ICD-10-CM

## 2021-12-12 DIAGNOSIS — E66.9 OBESITY, UNSPECIFIED: ICD-10-CM

## 2021-12-12 DIAGNOSIS — Z72.0 TOBACCO USE: ICD-10-CM

## 2021-12-12 DIAGNOSIS — R93.2 ABNORMAL FINDINGS ON DIAGNOSTIC IMAGING OF LIVER AND BILIARY: ICD-10-CM

## 2021-12-12 LAB
ALBUMIN SERPL-MCNC: 3.1 G/DL (ref 3.5–5.2)
ALBUMIN/GLOB SERPL: 0.6 G/DL
ALP SERPL-CCNC: 90 U/L (ref 39–117)
ALPHA-FETOPROTEIN: 1.18 NG/ML (ref 0–8.3)
ALT SERPL W P-5'-P-CCNC: 33 U/L (ref 1–41)
ANION GAP SERPL CALCULATED.3IONS-SCNC: 7.6 MMOL/L (ref 5–15)
AST SERPL-CCNC: 45 U/L (ref 1–40)
BASOPHILS # BLD AUTO: 0.04 10*3/MM3 (ref 0–0.2)
BASOPHILS NFR BLD AUTO: 0.8 % (ref 0–1.5)
BILIRUB SERPL-MCNC: 1 MG/DL (ref 0–1.2)
BUN SERPL-MCNC: 20 MG/DL (ref 6–20)
BUN/CREAT SERPL: 17.9 (ref 7–25)
CALCIUM SPEC-SCNC: 9.7 MG/DL (ref 8.6–10.5)
CHLORIDE SERPL-SCNC: 88 MMOL/L (ref 98–107)
CO2 SERPL-SCNC: 39.4 MMOL/L (ref 22–29)
CREAT SERPL-MCNC: 1.12 MG/DL (ref 0.76–1.27)
DEPRECATED RDW RBC AUTO: 43.3 FL (ref 37–54)
EOSINOPHIL # BLD AUTO: 0.34 10*3/MM3 (ref 0–0.4)
EOSINOPHIL NFR BLD AUTO: 6.4 % (ref 0.3–6.2)
ERYTHROCYTE [DISTWIDTH] IN BLOOD BY AUTOMATED COUNT: 17.3 % (ref 12.3–15.4)
FERRITIN SERPL-MCNC: 128 NG/ML (ref 30–400)
GFR SERPL CREATININE-BSD FRML MDRD: 88 ML/MIN/1.73
GLOBULIN UR ELPH-MCNC: 5.5 GM/DL
GLUCOSE SERPL-MCNC: 96 MG/DL (ref 65–99)
HAV IGM SERPL QL IA: NORMAL
HBV CORE IGM SERPL QL IA: NORMAL
HBV SURFACE AG SERPL QL IA: NORMAL
HCT VFR BLD AUTO: 52.4 % (ref 37.5–51)
HCV AB SER DONR QL: NORMAL
HGB BLD-MCNC: 16 G/DL (ref 13–17.7)
IMM GRANULOCYTES # BLD AUTO: 0.01 10*3/MM3 (ref 0–0.05)
IMM GRANULOCYTES NFR BLD AUTO: 0.2 % (ref 0–0.5)
LYMPHOCYTES # BLD AUTO: 1 10*3/MM3 (ref 0.7–3.1)
LYMPHOCYTES NFR BLD AUTO: 18.8 % (ref 19.6–45.3)
MCH RBC QN AUTO: 23.7 PG (ref 26.6–33)
MCHC RBC AUTO-ENTMCNC: 30.5 G/DL (ref 31.5–35.7)
MCV RBC AUTO: 77.6 FL (ref 79–97)
MONOCYTES # BLD AUTO: 0.59 10*3/MM3 (ref 0.1–0.9)
MONOCYTES NFR BLD AUTO: 11.1 % (ref 5–12)
NEUTROPHILS NFR BLD AUTO: 3.34 10*3/MM3 (ref 1.7–7)
NEUTROPHILS NFR BLD AUTO: 62.7 % (ref 42.7–76)
NRBC BLD AUTO-RTO: 0 /100 WBC (ref 0–0.2)
PLATELET # BLD AUTO: 313 10*3/MM3 (ref 140–450)
PMV BLD AUTO: 10.7 FL (ref 6–12)
POTASSIUM SERPL-SCNC: 4 MMOL/L (ref 3.5–5.2)
PROT SERPL-MCNC: 8.6 G/DL (ref 6–8.5)
RBC # BLD AUTO: 6.75 10*6/MM3 (ref 4.14–5.8)
SODIUM SERPL-SCNC: 135 MMOL/L (ref 136–145)
WBC NRBC COR # BLD: 5.32 10*3/MM3 (ref 3.4–10.8)

## 2021-12-12 PROCEDURE — 83516 IMMUNOASSAY NONANTIBODY: CPT | Performed by: INTERNAL MEDICINE

## 2021-12-12 PROCEDURE — 99232 SBSQ HOSP IP/OBS MODERATE 35: CPT | Performed by: NURSE PRACTITIONER

## 2021-12-12 PROCEDURE — 71275 CT ANGIOGRAPHY CHEST: CPT

## 2021-12-12 PROCEDURE — 82728 ASSAY OF FERRITIN: CPT | Performed by: INTERNAL MEDICINE

## 2021-12-12 PROCEDURE — 94799 UNLISTED PULMONARY SVC/PX: CPT

## 2021-12-12 PROCEDURE — 99233 SBSQ HOSP IP/OBS HIGH 50: CPT | Performed by: NURSE PRACTITIONER

## 2021-12-12 PROCEDURE — 25010000002 IOPAMIDOL 61 % SOLUTION: Performed by: HOSPITALIST

## 2021-12-12 PROCEDURE — 80053 COMPREHEN METABOLIC PANEL: CPT | Performed by: INTERNAL MEDICINE

## 2021-12-12 PROCEDURE — 82105 ALPHA-FETOPROTEIN SERUM: CPT | Performed by: INTERNAL MEDICINE

## 2021-12-12 PROCEDURE — 80074 ACUTE HEPATITIS PANEL: CPT | Performed by: INTERNAL MEDICINE

## 2021-12-12 PROCEDURE — 85025 COMPLETE CBC W/AUTO DIFF WBC: CPT | Performed by: INTERNAL MEDICINE

## 2021-12-12 PROCEDURE — 94762 N-INVAS EAR/PLS OXIMTRY CONT: CPT

## 2021-12-12 RX ORDER — METOPROLOL TARTRATE 50 MG/1
50 TABLET, FILM COATED ORAL EVERY 12 HOURS SCHEDULED
Status: DISCONTINUED | OUTPATIENT
Start: 2021-12-12 | End: 2021-12-13 | Stop reason: HOSPADM

## 2021-12-12 RX ADMIN — METOPROLOL TARTRATE 50 MG: 50 TABLET, FILM COATED ORAL at 20:45

## 2021-12-12 RX ADMIN — LISINOPRIL 20 MG: 20 TABLET ORAL at 20:45

## 2021-12-12 RX ADMIN — FUROSEMIDE 40 MG: 40 TABLET ORAL at 17:32

## 2021-12-12 RX ADMIN — LISINOPRIL 20 MG: 20 TABLET ORAL at 08:56

## 2021-12-12 RX ADMIN — FUROSEMIDE 40 MG: 40 TABLET ORAL at 08:56

## 2021-12-12 RX ADMIN — POTASSIUM CHLORIDE 20 MEQ: 750 TABLET, EXTENDED RELEASE ORAL at 08:56

## 2021-12-12 RX ADMIN — IOPAMIDOL 95 ML: 612 INJECTION, SOLUTION INTRAVENOUS at 11:52

## 2021-12-12 RX ADMIN — Medication 5 MG: at 20:50

## 2021-12-12 RX ADMIN — METOPROLOL TARTRATE 25 MG: 25 TABLET, FILM COATED ORAL at 08:56

## 2021-12-12 NOTE — PROGRESS NOTES
LOS: 5 days   Patient Care Team:  Provider, No Known as PCP - General      Subjective     Interval History: No events overnight    Subjective: He is sitting up on side of the bed, requires supplemental oxygen and appears slightly short of breath.  High heart rate.  He states he feels ready to go home.  No new GI complaints.    Labs -AST 45, CMP and CBC otherwise unremarkable, AFP and CA 19-9 normal.     12/10/2021 CT AP W/Wo -pancreas appears normal, however there is an adjacent mass which relates to prominent enlargement of the caudate lobe of the liver which can be associated with cirrhosis.  Ill-defined vaguely low-density lesion in the dome of the liver measuring 3.5 x 4.6 cm (concern for primary liver tumor such as hepatocellular carcinoma)      ROS:   Review of Systems   Constitutional: Negative.    HENT: Negative.    Respiratory: Positive for shortness of breath.    Cardiovascular: Negative.    Gastrointestinal: Negative.    Genitourinary: Negative.    Musculoskeletal: Negative.    Skin: Negative.    Neurological: Negative.    Psychiatric/Behavioral: Negative.           Medication Review:     Current Facility-Administered Medications:   •  acetaminophen (TYLENOL) tablet 650 mg, 650 mg, Oral, Q4H PRN, 650 mg at 12/08/21 2154 **OR** [DISCONTINUED] acetaminophen (TYLENOL) 160 MG/5ML solution 650 mg, 650 mg, Oral, Q4H PRN **OR** [DISCONTINUED] acetaminophen (TYLENOL) suppository 650 mg, 650 mg, Rectal, Q4H PRN, Stacey Ramos DO  •  furosemide (LASIX) tablet 40 mg, 40 mg, Oral, BID, Abrahan Godwin MD, 40 mg at 12/12/21 0856  •  lisinopril (PRINIVIL,ZESTRIL) tablet 20 mg, 20 mg, Oral, Q12H, Chacho Castro MD, 20 mg at 12/12/21 0856  •  melatonin tablet 5 mg, 5 mg, Oral, Nightly PRN, Stacey Ramos DO, 5 mg at 12/11/21 2129  •  metoprolol tartrate (LOPRESSOR) tablet 25 mg, 25 mg, Oral, Q12H, Kimberli Callahan APRN, 25 mg at 12/12/21 0856  •  nitroglycerin (NITROSTAT) SL tablet 0.4 mg, 0.4 mg,  Sublingual, Q5 Min PRN, Stacey Ramos DO  •  ondansetron (ZOFRAN) injection 4 mg, 4 mg, Intravenous, Q6H PRN, Stacey Ramos DO  •  potassium chloride (K-DUR,KLOR-CON) ER tablet 20 mEq, 20 mEq, Oral, Daily, Abrahan Godwin MD, 20 mEq at 12/12/21 0856      Objective     Vital Signs  Vitals:    12/11/21 1633 12/11/21 1931 12/11/21 2300 12/12/21 0744   BP: 134/84 121/69 108/58 126/71   BP Location: Right arm Right arm Right arm Left arm   Patient Position: Lying Sitting Lying Lying   Pulse: 108   101   Resp: 16 18 18 17   Temp: 98.8 °F (37.1 °C) 98.2 °F (36.8 °C) 98.2 °F (36.8 °C) 99 °F (37.2 °C)   TempSrc: Oral Oral Oral Oral   SpO2: 94%   96%   Weight:       Height:           Physical Exam: Sitting up in bed    General Appearance:    Awake and alert, in no acute distress, obese   Head:    Normocephalic, without obvious abnormality   Eyes:          Conjunctivae normal, anicteric sclera   Throat:   No oral lesions, no thrush, oral mucosa moist   Neck:   No adenopathy, supple, no JVD   Lungs:    3 L nasal cannula, some dyspnea   Abdomen:     Soft, non-tender, mildly distended, no rebound or guarding, no hepatosplenomegaly   Rectal:     Deferred   Extremities:   No edema, no cyanosis, moves equally bilaterally   Skin:   No bruising, no rash, no jaundice        Results Review:    CBC  Results from last 7 days   Lab Units 12/12/21  0706 12/11/21  0748 12/10/21  0735 12/09/21  0518 12/08/21  0828 12/07/21  1749   RBC 10*6/mm3 6.75* 6.55* 6.30* 6.32* 6.11* 6.14*   WBC 10*3/mm3 5.32 6.58 6.81 7.34 6.54 6.45   HEMOGLOBIN g/dL 16.0 15.6 15.2 15.1 14.7 14.9   PLATELETS 10*3/mm3 313 265 292 296 292 281       CMP  Results from last 7 days   Lab Units 12/12/21  0706 12/11/21  0748 12/10/21  0735 12/09/21  0518 12/08/21  0828 12/07/21  1749   SODIUM mmol/L 135* 138 137 138 136 138   POTASSIUM mmol/L 4.0 3.9 4.5 4.3 4.6 4.6   CHLORIDE mmol/L 88* 86* 88* 92* 98 98   CO2 mmol/L 39.4* 42.0* 40.5* 39.3* 34.1* 30.7*   BUN mg/dL 20  13 11 11 14 14   CREATININE mg/dL 1.12 0.89 0.91 0.84 0.95 1.01   GLUCOSE mg/dL 96 96 96 123* 148* 86   ALBUMIN g/dL 3.10* 3.20* 3.10* 3.30*  --  3.70   BILIRUBIN mg/dL 1.0 1.0 0.7 0.6  --  0.6   ALK PHOS U/L 90 91 92 99  --  91   AST (SGOT) U/L 45* 34 46* 47*  --  60*   ALT (SGPT) U/L 33 34 41 51*  --  60*       Amylase and Lipase        CRP         Imaging Results (Last 24 Hours)     ** No results found for the last 24 hours. **            ASSESSMENT:  41 y.o. male with no significant medical history who presented 4 days ago with complaint of penile swelling and difficulty urinating.  He was noted to have anasarca with increased leg swelling abdominal distention.  CT of the abdomen concerning for possible primary liver mass and cirrhosis.  -Abnormal CT of the liver concerning for cirrhosis and/or liver mass  -SOA   -Anasarca -improving  -Acute respiratory failure, suspected DEJAN and pulmonary hypertension  -Obesity  -Tobacco abuse        PLAN:  MRI liver mass protocol is recommended to further evaluate findings from abnormal CT. patient wants to go home but given his respiratory status it is likely that he will be here for a few more days.  He is agreeable to doing MRI liver mass protocol as inpatient if it can be done soon.  CT of the chest planned for today to rule out PE.  [facility] will continue to follow.        Ivanna Burns, QUIQUE  12/12/21  10:48 EST

## 2021-12-12 NOTE — NURSING NOTE
Ming Mckeon called saying that patient may be too large for [facility] standard MRI. The MRI weight limit is 350lbs and the patient is 342lbs but his height is only 6 foot which will be a tight fit. The larger MRI machine is down until after [place]. Dr. Rios notified. MRI on hold.

## 2021-12-12 NOTE — PLAN OF CARE
Goal Outcome Evaluation:  Plan of Care Reviewed With: patient           Outcome Summary: Denies pain or SOA.  02 4L NC to maintain sats 94%. Oral Lasix given. 2-3+ edema of BLE. Telemetry SR. Transferred from [facility].

## 2021-12-12 NOTE — PLAN OF CARE
Goal Outcome Evaluation:  Plan of Care Reviewed With: patient        Progress: no change  Outcome Summary: C/O dry nose, water added to N/C, VSS, NSR on monitor, oxygen removed multiple times while sleeping, desats to 84%, oxygen replaced, eyes closed at long interval, resting quietly in room, will continue to monitor

## 2021-12-12 NOTE — PROGRESS NOTES
[facility]  [phone]  Tripp Macdonald MD    Subjective:  LOS: 5    Chief Complaint: Sleep apnea    Currently on oxygen.  However sats 98 to 100%.  Wants to go home.    Objective   Vital Signs past 24hrs    Temp range: Temp (24hrs), Av.5 °F (36.9 °C), Min:98.2 °F (36.8 °C), Max:99 °F (37.2 °C)    BP range: BP: (108-126)/(58-75) 116/75  Pulse range: Heart Rate:  [101-102] 102  Resp rate range: Resp:  [16-18] 16    Device (Oxygen Therapy): nasal cannulaFlow (L/min):  [3-5] 5  Oxygen range:SpO2:  [96 %-98 %] 98 %      (!) 155 kg (342 lb 6.4 oz); Body mass index is 46.44 kg/m².  No intake or output data in the 24 hours ending 21 7895    Physical Exam  Constitutional:       Appearance: He is obese.   Eyes:      Pupils: Pupils are equal, round, and reactive to light.   Cardiovascular:      Rate and Rhythm: Normal rate and regular rhythm.      Heart sounds: No murmur heard.      Pulmonary:      Effort: Pulmonary effort is normal.      Breath sounds: Decreased breath sounds present.   Abdominal:      General: Bowel sounds are normal.      Palpations: Abdomen is soft. There is no mass.      Tenderness: There is no abdominal tenderness.   Musculoskeletal:         General: Swelling (mild chronic) present.   Neurological:      Mental Status: He is alert.       Results Review:    I have reviewed the laboratory and imaging data since the last note by [facility] physician.  My annotations are noted in assessment and plan.    Results from last 7 days   Lab Units 21  0706 12/10/21  1550   FERRITIN ng/mL 128.00  --    D DIMER QUANT MCGFEU/mL  --  2.61*       Medication Review:  I have reviewed the current MAR.  My annotations are noted in assessment and plan.       Plan   PCCM Problems  Fluid overload/acute diastolic heart failure/RV dysfunction  Acute hypoxic respiratory failure, improved  Suspected sleep apnea  Current cigarette smoker  Morbid obesity  Liver mass pending further work-up      Plan of  Treatment    Refuses to use noninvasive ventilation here.  I have ordered outpatient sleep study.     Acute hypoxic respiratory failure appears improved.  Can probably do without supplemental oxygen.  I will order overnight oximetry on room air and walking oximetry to see if he needs oxygen on discharge.    Recommended patient stop smoking.    Fluid overload improved.  Additional diuretics per cardiology team.    Liver mass work-up ongoing.  Note plans for MRI but patient is too large for scanner.    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  Recommend follow-up echo after adequate diuresis and as an outpatient.    No objections to discharge per pulmonary standpoint.      Tripp Macdonald MD  12/12/21  17:35 EST    While in the room and during my examination of the patient I wore gloves, gown, mask, eye protection and followed enhanced droplet/contact isolation protocol and precautions.  I washed my hands before and after this patient encounter.    Part of this note may be an electronic transcription/translation of spoken language to printed text using the Dragon Dictation System.

## 2021-12-12 NOTE — PROGRESS NOTES
Hospital Follow Up    LOS:  LOS: 5 days   Patient Name: Richard Rodrigez  Age/Sex: 41 y.o. male  : 1980  MRN: [id number]    Day of Service: 21   Length of Stay: 5  Encounter Provider: QUIQUE Rivera  Place of Service: [facility] CARDIOLOGY  Patient Care Team:  Provider, No Known as PCP - General    Subjective:     Chief Complaint: Tachycardia, elevated D-dimer, edema pancreatic mass     Interval History: No complaints today    Objective:     Objective:  Temp:  [97.9 °F (36.6 °C)-99 °F (37.2 °C)] 99 °F (37.2 °C)  Heart Rate:  [101-121] 102  Resp:  [16-18] 16  BP: ()/(58-84) 116/75     Intake/Output Summary (Last 24 hours) at 2021 1351  Last data filed at 2021 1730  Gross per 24 hour   Intake 480 ml   Output --   Net 480 ml     Body mass index is 46.44 kg/m².      21  0822 21  1031 21  1420   Weight: (!) 170 kg (375 lb) (!) 170 kg (375 lb) (!) 155 kg (342 lb 6.4 oz)     Weight change:     Physical Exam:   General Appearance:    Awake alert and oriented in no acute distress.   Color:  Skin:  Neuro:  HEENT:    Lungs:     Pink  Warm and dry  No focal, motor or sensory deficits  Neck supple, pupils equal, round and reactive. No JVD, No Bruit  Clear to auscultation,respirations regular, even and                  unlabored    Heart:    Regular rate and rhythm, S1 and S2, no murmur, no gallop, no rub. 1+ edema, DP/PT pulses are 2+   Chest Wall:    No abnormalities observed   Abdomen:     Normal bowel sounds, no masses, no organomegaly, soft        non-tender, non-distended, no guarding, no ascites noted   Extremities:   Moves all extremities well, no edema, no cyanosis, no redness       Lab Review:   Results from last 7 days   Lab Units 21  0706 21  0748   SODIUM mmol/L 135* 138   POTASSIUM mmol/L 4.0 3.9   CHLORIDE mmol/L 88* 86*   CO2 mmol/L 39.4* 42.0*   BUN mg/dL 20 13   CREATININE mg/dL 1.12 0.89   GLUCOSE mg/dL 96 96    CALCIUM mg/dL 9.7 9.4   AST (SGOT) U/L 45* 34   ALT (SGPT) U/L 33 34     Results from last 7 days   Lab Units 12/10/21  0735 12/07/21  1749   TROPONIN T ng/mL 0.018 0.011     Results from last 7 days   Lab Units 12/12/21  0706 12/11/21  0748   WBC 10*3/mm3 5.32 6.58   HEMOGLOBIN g/dL 16.0 15.6   HEMATOCRIT % 52.4* 52.9*   PLATELETS 10*3/mm3 313 265     Results from last 7 days   Lab Units 12/11/21  0748   INR  1.07     Results from last 7 days   Lab Units 12/08/21  0828 12/07/21  1749   MAGNESIUM mg/dL 2.2 2.2     Results from last 7 days   Lab Units 12/07/21  1749   CHOLESTEROL mg/dL 133   TRIGLYCERIDES mg/dL 81   HDL CHOL mg/dL 40     Results from last 7 days   Lab Units 12/07/21  1749   PROBNP pg/mL 1,374.0*     Results from last 7 days   Lab Units 12/07/21  1749   TSH uIU/mL 3.040     I reviewed the patient's new clinical results.  I personally viewed and interpreted the patient's EKG  Current Medications:   Scheduled Meds:furosemide, 40 mg, Oral, BID  lisinopril, 20 mg, Oral, Q12H  metoprolol tartrate, 25 mg, Oral, Q12H  potassium chloride, 20 mEq, Oral, Daily      Continuous Infusions:     Allergies:  No Known Allergies    Assessment:       Anasarca    Morbid obesity with BMI of 50.0-59.9, adult (HCC)    Tobacco use disorder    Hypertension    Hepatic steatosis    Suspected sleep apnea    Acute respiratory failure with hypoxia and hypercapnia (HCC)    Obesity hypoventilation syndrome (HCC)    Pulmonary hypertension (HCC)    1. Acute edema/anasarca,   2. RV dysfunction  3. Sinus tachycardia  4. D-dimer- CT negative for PE  5. Pancreatic mass  6. Acute hypoxic respiratory failure       Plan:       Ct was negative for PE today. HR still elevated at times. Edema is looking better. Will increase BB. If BP gets low cut back lisinopril.    QUIQUE Rivera  12/12/21  13:51 EST  Electronically signed by QUIQUE Rivera, 12/12/21, 1:51 PM EST.

## 2021-12-12 NOTE — PROGRESS NOTES
Name: Richard Rodrigez ADMIT: 2021   : 1980  PCP: Provider, No Known    MRN: [id number] LOS: 5 days   AGE/SEX: 41 y.o. male  ROOM: E652/1     Subjective   Subjective    No new issues wants to go home.     Review of Systems     Objective   Objective   Vital Signs  Temp:  [97.9 °F (36.6 °C)-99 °F (37.2 °C)] 99 °F (37.2 °C)  Heart Rate:  [101-121] 102  Resp:  [16-18] 16  BP: ()/(58-84) 116/75  SpO2:  [94 %-98 %] 98 %  on  Flow (L/min):  [3-5] 5;   Device (Oxygen Therapy): nasal cannula  Body mass index is 46.44 kg/m².    Intake/Output Summary (Last 24 hours) at 2021 1408  Last data filed at 2021 1730  Gross per 24 hour   Intake 480 ml   Output --   Net 480 ml     Wt Readings from Last 1 Encounters:   21 1420 (!) 155 kg (342 lb 6.4 oz)   21 1031 (!) 170 kg (375 lb)   21 0822 (!) 170 kg (375 lb)   21 0026 (!) 170 kg (375 lb)   21 0024 (!) 170 kg (375 lb)   21 2237 (!) 170 kg (375 lb)     Net IO Since Admission: -21,445 mL [21 1408]     Physical Exam  Vitals and nursing note reviewed.   Constitutional:       General: He is not in acute distress.     Appearance: Normal appearance. He is obese.   Neck:      Vascular: No JVD.      Trachea: No tracheal deviation.   Cardiovascular:      Rate and Rhythm: Normal rate and regular rhythm.      Heart sounds: Normal heart sounds.   Pulmonary:      Effort: Pulmonary effort is normal. No respiratory distress.      Breath sounds: Normal breath sounds.   Abdominal:      General: Bowel sounds are normal.      Palpations: Abdomen is soft.      Tenderness: There is no abdominal tenderness.   Musculoskeletal:      Right lower leg: Edema present.      Left lower leg: Edema present.   Skin:     General: Skin is warm and dry.   Neurological:      General: No focal deficit present.      Mental Status: He is alert and oriented to person, place, and time.   Psychiatric:         Behavior: Behavior normal. Behavior is  cooperative.         Results Review     I reviewed the patient's new clinical results.  Results from last 7 days   Lab Units 12/12/21  0706 12/11/21  0748 12/10/21  0735 12/09/21  0518   WBC 10*3/mm3 5.32 6.58 6.81 7.34   HEMOGLOBIN g/dL 16.0 15.6 15.2 15.1   PLATELETS 10*3/mm3 313 265 292 296     Results from last 7 days   Lab Units 12/12/21  0706 12/11/21  0748 12/10/21  0735 12/09/21  0518   SODIUM mmol/L 135* 138 137 138   POTASSIUM mmol/L 4.0 3.9 4.5 4.3   CHLORIDE mmol/L 88* 86* 88* 92*   CO2 mmol/L 39.4* 42.0* 40.5* 39.3*   BUN mg/dL 20 13 11 11   CREATININE mg/dL 1.12 0.89 0.91 0.84   GLUCOSE mg/dL 96 96 96 123*   EGFR IF AFRICN AM mL/min/1.73 88 115 112 123     Results from last 7 days   Lab Units 12/12/21  0706 12/11/21  0748 12/10/21  0735 12/09/21  0518   ALBUMIN g/dL 3.10* 3.20* 3.10* 3.30*   BILIRUBIN mg/dL 1.0 1.0 0.7 0.6   ALK PHOS U/L 90 91 92 99   AST (SGOT) U/L 45* 34 46* 47*   ALT (SGPT) U/L 33 34 41 51*     Results from last 7 days   Lab Units 12/12/21  0706 12/11/21  0748 12/10/21  0735 12/09/21  0518 12/08/21  0828 12/07/21  1749 12/07/21  1749   CALCIUM mg/dL 9.7 9.4 9.6 9.4 9.3   < > 9.1   ALBUMIN g/dL 3.10* 3.20* 3.10* 3.30*  --    < > 3.70   MAGNESIUM mg/dL  --   --   --   --  2.2  --  2.2   PHOSPHORUS mg/dL  --   --   --   --  5.3*  --   --     < > = values in this interval not displayed.   )    Results from last 7 days   Lab Units 12/07/21  1749   TRIGLYCERIDES mg/dL 81     Results from last 7 days   Lab Units 12/11/21  0748   PROTIME Seconds 13.7   INR  1.07     Results from last 7 days   Lab Units 12/07/21  1748   COVID19  Not Detected     Scheduled Medications  furosemide, 40 mg, Oral, BID  lisinopril, 20 mg, Oral, Q12H  metoprolol tartrate, 50 mg, Oral, Q12H  potassium chloride, 20 mEq, Oral, Daily    Infusions   Diet  Diet Regular; Thin; Low Sodium; 2,000 mg Na         Assessment/Plan     Active Hospital Problems    Diagnosis  POA   • **Anasarca [R60.1]  Yes   • Pulmonary  hypertension (HCC) [I27.20]  Yes   • Suspected sleep apnea [R29.818]  Yes   • Acute respiratory failure with hypoxia and hypercapnia (HCC) [J96.01, J96.02]  Yes   • Obesity hypoventilation syndrome (HCC) [E66.2]  Yes   • Hepatic steatosis [K76.0]  Yes   • Morbid obesity with BMI of 50.0-59.9, adult (HCC) [E66.01, Z68.43]  Not Applicable   • Tobacco use disorder [F17.200]  Yes   • Hypertension [I10]  Yes      Resolved Hospital Problems    Diagnosis Date Resolved POA   • Acute urinary retention [R33.8] 12/11/2021 Yes       41 y.o. male with Anasarca.    CTA negative for PE as was LE venous doppler.  He continues to require oxygen and may need this after discharge.  Pulmonology yet to see him today.  Continue current diuresis.  Weight down over 30 pound since admission.  Cardiology adjusting BP medication.  MRI liver ordered though radiology states MRI machine not compatible with his size.  Will likely need to schedule this outpatient after discharge.  Note AFP and CA 19-9 both normal.      · Pharmacy to dose Lovenox for DVT prophylaxis.   · Full code.  · Discussed with patient and nursing staff.  · Anticipate discharge home when cleared by consultants.      Abrahan Godwin MD  [facility]  12/12/21  14:08 EST

## 2021-12-13 VITALS
WEIGHT: 315 LBS | HEIGHT: 72 IN | TEMPERATURE: 98.4 F | DIASTOLIC BLOOD PRESSURE: 78 MMHG | OXYGEN SATURATION: 100 % | BODY MASS INDEX: 42.66 KG/M2 | RESPIRATION RATE: 16 BRPM | SYSTOLIC BLOOD PRESSURE: 124 MMHG | HEART RATE: 102 BPM

## 2021-12-13 LAB
ALBUMIN SERPL-MCNC: 3.6 G/DL (ref 3.5–5.2)
ALBUMIN/GLOB SERPL: 0.7 G/DL
ALP SERPL-CCNC: 96 U/L (ref 39–117)
ALT SERPL W P-5'-P-CCNC: 44 U/L (ref 1–41)
ANION GAP SERPL CALCULATED.3IONS-SCNC: 8.2 MMOL/L (ref 5–15)
AST SERPL-CCNC: 58 U/L (ref 1–40)
BILIRUB SERPL-MCNC: 0.9 MG/DL (ref 0–1.2)
BUN SERPL-MCNC: 19 MG/DL (ref 6–20)
BUN/CREAT SERPL: 20 (ref 7–25)
CALCIUM SPEC-SCNC: 9.9 MG/DL (ref 8.6–10.5)
CHLORIDE SERPL-SCNC: 93 MMOL/L (ref 98–107)
CO2 SERPL-SCNC: 32.8 MMOL/L (ref 22–29)
CREAT SERPL-MCNC: 0.95 MG/DL (ref 0.76–1.27)
GFR SERPL CREATININE-BSD FRML MDRD: 106 ML/MIN/1.73
GLOBULIN UR ELPH-MCNC: 5.3 GM/DL
GLUCOSE SERPL-MCNC: 116 MG/DL (ref 65–99)
POTASSIUM SERPL-SCNC: 4.5 MMOL/L (ref 3.5–5.2)
PROT SERPL-MCNC: 8.9 G/DL (ref 6–8.5)
SODIUM SERPL-SCNC: 134 MMOL/L (ref 136–145)

## 2021-12-13 PROCEDURE — 99232 SBSQ HOSP IP/OBS MODERATE 35: CPT | Performed by: INTERNAL MEDICINE

## 2021-12-13 PROCEDURE — 94618 PULMONARY STRESS TESTING: CPT

## 2021-12-13 PROCEDURE — 80053 COMPREHEN METABOLIC PANEL: CPT | Performed by: INTERNAL MEDICINE

## 2021-12-13 RX ORDER — LISINOPRIL 40 MG/1
40 TABLET ORAL DAILY
Qty: 30 TABLET | Refills: 0 | Status: SHIPPED | OUTPATIENT
Start: 2021-12-13 | End: 2021-12-23 | Stop reason: SDUPTHER

## 2021-12-13 RX ORDER — METOPROLOL TARTRATE 50 MG/1
50 TABLET, FILM COATED ORAL 2 TIMES DAILY
Qty: 60 TABLET | Refills: 0 | Status: SHIPPED | OUTPATIENT
Start: 2021-12-13 | End: 2021-12-23 | Stop reason: SDUPTHER

## 2021-12-13 RX ORDER — ZOLPIDEM TARTRATE 5 MG/1
TABLET ORAL
Qty: 2 TABLET | Refills: 0 | Status: SHIPPED | OUTPATIENT
Start: 2021-12-13 | End: 2021-12-23

## 2021-12-13 RX ORDER — FUROSEMIDE 40 MG/1
40 TABLET ORAL 2 TIMES DAILY
Qty: 60 TABLET | Refills: 0 | Status: SHIPPED | OUTPATIENT
Start: 2021-12-13 | End: 2021-12-23 | Stop reason: SDUPTHER

## 2021-12-13 RX ADMIN — FUROSEMIDE 40 MG: 40 TABLET ORAL at 08:08

## 2021-12-13 RX ADMIN — LISINOPRIL 20 MG: 20 TABLET ORAL at 08:08

## 2021-12-13 RX ADMIN — METOPROLOL TARTRATE 50 MG: 50 TABLET, FILM COATED ORAL at 08:08

## 2021-12-13 RX ADMIN — POTASSIUM CHLORIDE 20 MEQ: 750 TABLET, EXTENDED RELEASE ORAL at 08:08

## 2021-12-13 NOTE — PLAN OF CARE
Goal Outcome Evaluation:  Plan of Care Reviewed With: patient           Outcome Summary: Denies pain or SOA. 02 weaned from 5L to 3L NC. Oral Lasix given. Edema mild in BLE. Telemetry ST.

## 2021-12-13 NOTE — PLAN OF CARE
Goal Outcome Evaluation:  Plan of Care Reviewed With: patient        Progress: improving  Outcome Summary: has been on RA this shift with sleep study, desats at times, denies soa, VSS, NSR on monitor, eyes closed at long interval, resting quietly in room, will continue to monitor

## 2021-12-13 NOTE — CASE MANAGEMENT/SOCIAL WORK
Continued Stay Note  [facility]     Patient Name: Richard Rodrigez  MRN: [id number]  Today's Date: 12/13/2021    Admit Date: 12/7/2021     Discharge Plan     Row Name 12/13/21 0954       Plan    Plan Plan home .  AMANDA Lucero RN    Patient/Family in Agreement with Plan yes    Plan Comments Spoke with pt at bedside.  Pt denies any discharge needs.  Plan home.  AMANDA Lucero RN               Discharge Codes    No documentation.               Expected Discharge Date and Time     Expected Discharge Date Expected Discharge Time    Dec 15, 2021             Tracy Lucero, RN

## 2021-12-13 NOTE — CASE MANAGEMENT/SOCIAL WORK
Case Management Discharge Note      Final Note: Pt discharged home.   AMANDA Lucero RN         Selected Continued Care - Admitted Since 12/7/2021     Destination    No services have been selected for the patient.              Durable Medical Equipment    No services have been selected for the patient.              Dialysis/Infusion    No services have been selected for the patient.              Home Medical Care    No services have been selected for the patient.              Therapy    No services have been selected for the patient.              Community Resources    No services have been selected for the patient.              Community & DME    No services have been selected for the patient.                  Transportation Services  Private: Car    Final Discharge Disposition Code: 01 - home or self-care

## 2021-12-13 NOTE — NURSING NOTE
Torres signed off, waiting on [facility] and GI clearance for DC home.  Explained to the pt.  He understoon.

## 2021-12-13 NOTE — DISCHARGE SUMMARY
Patient Name: Richard Rodrigez  : 1980  MRN: [id number]    Date of Admission: 2021  Date of Discharge:  2021  Primary Care Physician: Provider, No Known      Chief Complaint:   Groin Swelling and Urinary Retention      Discharge Diagnoses     Active Hospital Problems    Diagnosis  POA   • **Anasarca [R60.1]  Yes   • Pulmonary hypertension (HCC) [I27.20]  Yes   • Suspected sleep apnea [R29.818]  Yes   • Acute respiratory failure with hypoxia and hypercapnia (HCC) [J96.01, J96.02]  Yes   • Obesity hypoventilation syndrome (HCC) [E66.2]  Yes   • Hepatic steatosis [K76.0]  Yes   • Morbid obesity with BMI of 50.0-59.9, adult (HCC) [E66.01, Z68.43]  Not Applicable   • Tobacco use disorder [F17.200]  Yes   • Hypertension [I10]  Yes      Resolved Hospital Problems    Diagnosis Date Resolved POA   • Acute urinary retention [R33.8] 2021 Yes        Hospital Course     Mr. Rodrigez is a 41 y.o. male with history of alcohol abuse and obesity who presented to [facility] initially complaining of generalized swelling.  Please see the admitting history and physical for further details.  He was found to have anasarca and was admitted to the hospital for further evaluation and treatment.  He was seen by cardiology, gastroenterology as well as pulmonology.  Echocardiogram was performed with results outlined below.  Ejection fraction was normal.  Anasarca not felt to be secondary to congestive heart failure.  ABG revealed severe chronic CO2 retention consistent with pulmonary hypertension and obesity hypoventilation syndrome.  He was given a trial of BiPAP in the hospital but did not tolerate.  It was explained to patient the importance of weight loss and follow-up with pulmonology for outpatient sleep study.  Appointment has evidently been made for this.  Cardiology has started him on antihypertensive regimen which needs to be continued.  He was given aggressive IV diuresis here and has diuresed  over 30 pounds.  Continue oral Lasix at discharge.  He has appointment with new PCP within the next week or so.  Will monitor renal function and electrolytes.  Since he is being discharged on high-dose lisinopril will not prescribe KCl in addition to his Lasix.    He was seen by gastroenterology.  There was initial concern about pancreatic mass but subsequent imaging suggested more of a possible liver mass.  It was recommended he undergo MRI of his liver but his body habitus cannot be accommodated on equipment here in the hospital.  This needs to be arranged in the outpatient setting.  He does have evidence of fatty liver but no overt cirrhosis.  MRI liver can further evaluate for this as well.      Day of Discharge     Subjective:  Doing very well and requesting discharge.    Physical Exam:  Temp:  [98.3 °F (36.8 °C)-99 °F (37.2 °C)] 98.4 °F (36.9 °C)  Heart Rate:  [] 102  Resp:  [16-18] 16  BP: (114-135)/(64-89) 124/78  Body mass index is 45.71 kg/m².  Physical Exam  Vitals and nursing note reviewed.   Constitutional:       General: He is not in acute distress.     Appearance: Normal appearance. He is obese.   Neck:      Vascular: No JVD.      Trachea: No tracheal deviation.   Cardiovascular:      Rate and Rhythm: Normal rate and regular rhythm.      Heart sounds: Normal heart sounds.   Pulmonary:      Effort: Pulmonary effort is normal. No respiratory distress.      Breath sounds: Normal breath sounds.   Abdominal:      General: Bowel sounds are normal.      Palpations: Abdomen is soft.      Tenderness: There is no abdominal tenderness.   Musculoskeletal:      Right lower leg: Edema present.      Left lower leg: Edema present.   Skin:     General: Skin is warm and dry.   Neurological:      General: No focal deficit present.      Mental Status: He is alert and oriented to person, place, and time.   Psychiatric:         Behavior: Behavior normal. Behavior is cooperative.         Consultants     Consult Orders  (all) (From admission, onward)     Start     Ordered    12/10/21 1941  Inpatient Gastroenterology Consult  Once        Specialty:  Gastroenterology  Provider:  Anthony Russo MD    12/10/21 1940    12/10/21 140  Inpatient Pulmonology Consult  Once        Specialty:  Pulmonary Disease  Provider:  Chan Resendez MD    12/10/21 1403    21 1631  Inpatient Cardiology Consult  Once        Specialty:  Cardiology  Provider:  Sara Heaton MD    21 1631    21 0906  Inpatient Case Management  Consult  Once        Provider:  (Not yet assigned)    21 0906              Procedures     Imaging Results (All)     Procedure Component Value Units Date/Time    CT Angiogram Chest With & Without Contrast [[id number]] Collected: 21 1244     Updated: 21 1434    Narrative:      CT ANGIOGRAPHY CHEST WITH INTRAVENOUS CONTRAST AND 3-D RECONSTRUCTIONS     HISTORY: Shortness of breath. Elevated D-dimer.     TECHNIQUE/FINDINGS: The CT scan was performed through the chest with CT  angiography protocol using intravenous contrast and 3-D reconstructions  and demonstrates the followin. The examination is somewhat limited by the degree of contrast  enhancement and the patient's large size. There is no evidence of  pulmonary embolus in the main pulmonary arteries or secondary branches.  The visualized 3rd degree branches are also unremarkable but less  opacified. The thoracic aorta shows no evidence of aneurysm or  dissection.  2. The lungs are well-expanded and clear. There is no mediastinal or  hilar or axillary adenopathy. There is no pericardial effusion.  3. The CT images through the upper liver are limited by the timing of  contrast and the suspicious lesion in the dome of liver noted on the  abdominal CT scan performed 2 days ago is not well demonstrated. Again  noted is prominent enlargement of the caudate lobe of the liver.       CT Abdomen With & Without Contrast [[id number]]  Collected: 12/10/21 1850     Updated: 12/10/21 2033    Narrative:      CT SCAN OF THE ABDOMEN WITHOUT AND WITH INTRAVENOUS CONTRAST WITH  PANCREATIC MASS PROTOCOL     HISTORY: Possible pancreatic mass noted on ultrasound. Patient has  history of alcohol induced hepatitis. Ascites.     TECHNIQUE: The CT scan was performed through the abdomen without and  with intravenous contrast with pancreatic mass protocol and thin  sections through the pancreas.      FINDINGS: The following findings are present:  1. The pancreas appears normal. However there is an adjacent mass which  relates to prominent enlargement of the caudate lobe of the liver which  can often be associated with cirrhosis.  2. In addition, there is a somewhat ill-defined vaguely low density  lesion in the dome of the liver measuring up to 3.5 x 4.6 cm. The  remainder of the liver has a somewhat heterogeneous texture and the  findings do raise the concern of a primary liver tumor such as  hepatocellular carcinoma. Further evaluation with MRI scan and possible  CT-guided biopsy is therefore recommended.  3. The spleen is not enlarged. There is no portal vein thrombosis. The  gallbladder shows no definite gallstones and there is no wall  thickening. The adrenal glands and both kidneys are unremarkable.  4. There is no aortic aneurysm or retroperitoneal lymphadenopathy. The  visualized large and small bowel loops appear normal.       US Abdomen Complete [[id number]] Collected: 12/09/21 0758     Updated: 12/09/21 0812    Narrative:      US ABDOMEN COMPLETE-     Clinical: Alcoholic hepatitis, ascites     No: Technically difficult examination due to large body habitus     Both kidneys were somewhat difficult to image. The right kidney is 11.4  cm in length and left kidney is 13.4 cm in length. No obstructive  uropathy or mass identified. Renal cortical echotexture within normal  limits.     There is a 1 cm mobile gallstone demonstrated. In addition there  is  adenomyomatosis of the gallbladder. No biliary duct dilatation CBD is 5  mm. No pericholecystic fluid or gallbladder wall thickening seen.     The abdominal aorta is partially visualized 2.4 cm in maximum diameter.  Visualized inferior vena cava within normal limits. Splenic size upper  limits of normal measuring 12.9 cm in maximum length. No free fluid is  demonstrated within the upper abdomen.     The pancreas cannot be adequately visualized. Obscured due to gas within  the overlying stomach. There is however a masslike area in the  generalized area of the pancreatic head measuring 5.3 x 4.4 x 4.4 cm.  Pancreatic head mass or enlarged caudate lobe of the liver are both  possibilities. Computed tomography recommended as follow-up.     Hepatic echotexture is heterogenous. Hepatic size normal to slightly  smaller than anticipated. The overall hepatic echotexture is greater  than anticipated, suggesting fatty infiltration. Areas of fatty sparing  noted. No focal liver lesion seen.     CONCLUSION:  1. Indeterminate masslike area in the general vicinity of the pancreatic  head, dedicated pancreatic protocol CT is recommended as follow-up.  2. Hepatic echotexture is heterogenous, overall greater than anticipated  consistent with areas of fatty infiltration. No focal liver lesion seen.  3. No free fluid within the upper abdomen.  4. Splenic size upper limits of normal.  5. Gallstone with adenomyomatosis of the gallbladder.     This report was finalized on 12/9/2021 8:09 AM by Dr. Darryl Dallas M.D.       XR Chest 1 View [[id number]] Collected: 12/07/21 1840     Updated: 12/07/21 1844    Narrative:      AP CHEST     HISTORY: Swelling to abdomen and lower extremity     COMPARISON: None     FINDINGS: Lung fields appear clear. Heart size probably normal for  technique. No appreciable pneumothorax. No acute bony abnormality.       Impression:      No acute findings     This report was finalized on 12/7/2021 6:41 PM by   Bryan Houston M.D.           Results for orders placed during the hospital encounter of 12/07/21    Duplex Venous Lower Extremity - Bilateral CAR    Interpretation Summary  · Normal bilateral lower extremity venous duplex scan.    Results for orders placed during the hospital encounter of 12/07/21    Adult Transthoracic Echo Complete W/ Cont if Necessary Per Protocol    Interpretation Summary  · Left ventricular wall thickness is consistent with mild concentric hypertrophy.  · Estimated left ventricular EF = 57% Left ventricular systolic function is normal.  · Left ventricular diastolic function was indeterminate.  · Mildly reduced right ventricular systolic function noted.    Pertinent Labs     Results from last 7 days   Lab Units 12/12/21  0706 12/11/21  0748 12/10/21  0735 12/09/21  0518   WBC 10*3/mm3 5.32 6.58 6.81 7.34   HEMOGLOBIN g/dL 16.0 15.6 15.2 15.1   PLATELETS 10*3/mm3 313 265 292 296     Results from last 7 days   Lab Units 12/13/21  1003 12/12/21  0706 12/11/21  0748 12/10/21  0735   SODIUM mmol/L 134* 135* 138 137   POTASSIUM mmol/L 4.5 4.0 3.9 4.5   CHLORIDE mmol/L 93* 88* 86* 88*   CO2 mmol/L 32.8* 39.4* 42.0* 40.5*   BUN mg/dL 19 20 13 11   CREATININE mg/dL 0.95 1.12 0.89 0.91   GLUCOSE mg/dL 116* 96 96 96   EGFR IF AFRICN AM mL/min/1.73 106 88 115 112     Results from last 7 days   Lab Units 12/13/21  1003 12/12/21  0706 12/11/21  0748 12/10/21  0735   ALBUMIN g/dL 3.60 3.10* 3.20* 3.10*   BILIRUBIN mg/dL 0.9 1.0 1.0 0.7   ALK PHOS U/L 96 90 91 92   AST (SGOT) U/L 58* 45* 34 46*   ALT (SGPT) U/L 44* 33 34 41     Results from last 7 days   Lab Units 12/13/21  1003 12/12/21  0706 12/11/21  0748 12/10/21  0735 12/09/21  0518 12/08/21  0828 12/07/21  1749 12/07/21  1749   CALCIUM mg/dL 9.9 9.7 9.4 9.6   < > 9.3   < > 9.1   ALBUMIN g/dL 3.60 3.10* 3.20* 3.10*   < >  --   --  3.70   MAGNESIUM mg/dL  --   --   --   --   --  2.2  --  2.2   PHOSPHORUS mg/dL  --   --   --   --   --  5.3*  --   --     <  > = values in this interval not displayed.       Results from last 7 days   Lab Units 12/10/21  1550 12/10/21  0735 12/07/21  1749   TROPONIN T ng/mL  --  0.018 0.011   PROBNP pg/mL  --   --  1,374.0*   D DIMER QUANT MCGFEU/mL 2.61*  --   --      Results from last 7 days   Lab Units 12/08/21  0043   CREATININE UR mg/dL 11.8   PROTEIN TOTAL URINE mg/dL <4.0     Results from last 7 days   Lab Units 12/07/21  1749   CHOLESTEROL mg/dL 133   TRIGLYCERIDES mg/dL 81   HDL CHOL mg/dL 40   LDL CHOL mg/dL 77     Results from last 7 days   Lab Units 12/11/21  0748   PROTIME Seconds 13.7   INR  1.07     ALPHA-FETOPROTEIN 1.18     CEA 2.29     CA 19-9 20.7     Hepatitis B Surface Ag Non-Reactive   Hep A IgM Non-Reactive   Hep B C IgM Non-Reactive   Hepatitis C Ab Non-Reactive       Test Results Pending at Discharge     Pending Labs     Order Current Status    Anti-Smooth Muscle Antibody Titer In process          Discharge Details        Discharge Medications      New Medications      Instructions Start Date   furosemide 40 MG tablet  Commonly known as: LASIX   40 mg, Oral, 2 Times Daily      lisinopril 40 MG tablet  Commonly known as: PRINIVIL,ZESTRIL   40 mg, Oral, Daily      metoprolol tartrate 50 MG tablet  Commonly known as: LOPRESSOR   50 mg, Oral, 2 Times Daily      zolpidem 5 MG tablet  Commonly known as: Ambien   Bring to sleep lab DO NOT use at home. PLEASE take if not asleep within 30 mins of start of study.             No Known Allergies    Discharge Disposition:  Home or Self Care      Discharge Diet:  Diet Order   Procedures   • Diet Regular; Thin; Low Sodium; 2,000 mg Na       Discharge Activity:   Activity Instructions     Activity as Tolerated            CODE STATUS:    Code Status and Medical Interventions:   Ordered at: 12/08/21 0116     Code Status (Patient has no pulse and is not breathing):    CPR (Attempt to Resuscitate)     Medical Interventions (Patient has pulse or is breathing):    Full Support        Future Appointments   Date Time Provider Department Center   12/17/2021 10:30 AM Jessie Almodovar III, NP-C [facility]     Additional Instructions for the Follow-ups that You Need to Schedule     COVID-19,[facility],NP/OP Swab in Transport Media or ESwab 72 HR TAT - Swab, Nasopharynx    Dec 11, 2021 (Approximate)      Order Urgency: Routine    Release to patient: Immediate    Previously tested for COVID-19?: No    Employed in healthcare setting?: No    Symptomatic for COVID-19 as defined by CDC?: No    Hospitalized for COVID-19?: No    Admitted to ICU for COVID-19?: No    Resident in a congregate (group) care setting?: No            Follow-up Information     Provider, No Known Follow up in 2 week(s).    Contact information:  [place] [id number] [phone]             Tripp Macdonald MD Follow up.    Specialties: Pulmonary Disease, Sleep Medicine  Contact information:  [address] [facility] [address]  [place] [address]  [phone]             Dennys Moreno MD Follow up.    Specialty: Cardiology  Contact information:  [address] [facility] [address]  [place] [address]  [phone]             Bong Rios MD Follow up.    Specialty: Gastroenterology  Contact information:  [address] [facility] [address]  [place] [id number] [phone]                         Additional Instructions for the Follow-ups that You Need to Schedule     COVID-19,[facility],NP/OP Swab in Transport Media or ESwab 72 HR TAT - Swab, Nasopharynx    Dec 11, 2021 (Approximate)      Order Urgency: Routine    Release to patient: Immediate    Previously tested for COVID-19?: No    Employed in healthcare setting?: No    Symptomatic for COVID-19 as defined by CDC?: No    Hospitalized for COVID-19?: No    Admitted to ICU for COVID-19?: No    Resident in a congregate (group) care setting?: No           Time Spent on Discharge:  Greater than 30 minutes      Abrahan Godwin MD  [facility]  12/13/21  10:36 EST

## 2021-12-13 NOTE — PROGRESS NOTES
Exercise Oximetry    Patient Name:Richard Rodrigez   MRN: [id number]   Date: 12/13/21             ROOM AIR BASELINE   SpO2% 89   Heart Rate 98   Blood Pressure     EXERCISE ON ROOM AIR SpO2% EXERCISE ON O2 @  LPM SpO2%   1 MINUTE 92 1 MINUTE    2 MINUTES 95 2 MINUTES    3 MINUTES 95 3 MINUTES    4 MINUTES 98 4 MINUTES    5 MINUTES 98 5 MINUTES    6 MINUTES 96 6 MINUTES               Distance Walked   Distance Walked   Dyspnea ([place] Scale)   Dyspnea ([place] Scale)   Fatigue ([place] Scale)   Fatigue ([place]la Scale)   SpO2% Post Exercise  90 SpO2% Post Exercise   HR Post Exercise  96   HR Post Exercise   Time to Recovery   Time to Recovery     Comments: PT was able to complete walk while holding conversation with forehead probe in place with no desats, SOA or dizziness observed or reported. PT does not qualiy or need home O2.

## 2021-12-13 NOTE — PLAN OF CARE
Goal Outcome Evaluation:  Plan of Care Reviewed With: patient           Outcome Summary: Pt to be DC'd home today.  No Oxygen needs at home per the walking OX.  Waiting on Cardiology and GI to clear pt for DC home.

## 2021-12-13 NOTE — PAYOR COMM NOTE
"Richard Rodrigez (41 y.o. Male)     PLEASE SEE ATTACHED DC SUMMARY    REF#[id number]    THANK YOU    NAKIA COE LPN CCP            Date of Birth Social Security Number Address Home Phone MRN    1980  [id number] [id number] [phone] [id number]    Baptism Marital Status             None        Admission Date Admission Type Admitting Provider Attending Provider Department, Room/Bed    21 Stacey Vargas DO  [address], E652/1    Discharge Date Discharge Disposition Discharge Destination          2021 Home or Self Care              Attending Provider: (none)   Allergies: No Known Allergies    Isolation: None   Infection: None   Code Status: Prior   Advance Care Planning Activity    Ht: 182.9 cm (72\")   Wt: 153 kg (337 lb)    Admission Cmt: None   Principal Problem: Anasarca [R60.1]                 Active Insurance as of 2021     Primary Coverage     Payor Plan Insurance Group Employer/Plan Group    ANTHEM BLUE CROSS ANTHEM BLUE CROSS BLUE SHIELD PPO [id number]     Payor Plan Address Payor Plan Phone Number Payor Plan Fax Number Effective Dates    [address] [phone]  2021 - None Entered    [place] [address]       Subscriber Name Subscriber Birth Date Member ID       RICHARD RODRIGEZ 1980 [id number]                 Emergency Contacts      (Rel.) Home Phone Work Phone Mobile Phone    AIDE KING (Daughter) [phone] -- [phone]               Discharge Summary      Abrahan Godwin MD at 21 1036              Patient Name: Richard Rodrigez  : 1980  MRN: [id number]    Date of Admission: 2021  Date of Discharge:  2021  Primary Care Physician: Provider, No Known      Chief Complaint:   Groin Swelling and Urinary Retention      Discharge Diagnoses     Active Hospital Problems    Diagnosis  POA   • **Anasarca [R60.1]  Yes   • Pulmonary hypertension (HCC) [I27.20]  Yes "   • Suspected sleep apnea [R29.818]  Yes   • Acute respiratory failure with hypoxia and hypercapnia (HCC) [J96.01, J96.02]  Yes   • Obesity hypoventilation syndrome (HCC) [E66.2]  Yes   • Hepatic steatosis [K76.0]  Yes   • Morbid obesity with BMI of 50.0-59.9, adult (HCC) [E66.01, Z68.43]  Not Applicable   • Tobacco use disorder [F17.200]  Yes   • Hypertension [I10]  Yes      Resolved Hospital Problems    Diagnosis Date Resolved POA   • Acute urinary retention [R33.8] 12/11/2021 Yes        Hospital Course     Mr. Rodrigez is a 41 y.o. male with history of alcohol abuse and obesity who presented to [facility] initially complaining of generalized swelling.  Please see the admitting history and physical for further details.  He was found to have anasarca and was admitted to the hospital for further evaluation and treatment.  He was seen by cardiology, gastroenterology as well as pulmonology.  Echocardiogram was performed with results outlined below.  Ejection fraction was normal.  Anasarca not felt to be secondary to congestive heart failure.  ABG revealed severe chronic CO2 retention consistent with pulmonary hypertension and obesity hypoventilation syndrome.  He was given a trial of BiPAP in the hospital but did not tolerate.  It was explained to patient the importance of weight loss and follow-up with pulmonology for outpatient sleep study.  Appointment has evidently been made for this.  Cardiology has started him on antihypertensive regimen which needs to be continued.  He was given aggressive IV diuresis here and has diuresed over 30 pounds.  Continue oral Lasix at discharge.  He has appointment with new PCP within the next week or so.  Will monitor renal function and electrolytes.  Since he is being discharged on high-dose lisinopril will not prescribe KCl in addition to his Lasix.    He was seen by gastroenterology.  There was initial concern about pancreatic mass but subsequent imaging suggested  more of a possible liver mass.  It was recommended he undergo MRI of his liver but his body habitus cannot be accommodated on equipment here in the hospital.  This needs to be arranged in the outpatient setting.  He does have evidence of fatty liver but no overt cirrhosis.  MRI liver can further evaluate for this as well.      Day of Discharge     Subjective:  Doing very well and requesting discharge.    Physical Exam:  Temp:  [98.3 °F (36.8 °C)-99 °F (37.2 °C)] 98.4 °F (36.9 °C)  Heart Rate:  [] 102  Resp:  [16-18] 16  BP: (114-135)/(64-89) 124/78  Body mass index is 45.71 kg/m².  Physical Exam  Vitals and nursing note reviewed.   Constitutional:       General: He is not in acute distress.     Appearance: Normal appearance. He is obese.   Neck:      Vascular: No JVD.      Trachea: No tracheal deviation.   Cardiovascular:      Rate and Rhythm: Normal rate and regular rhythm.      Heart sounds: Normal heart sounds.   Pulmonary:      Effort: Pulmonary effort is normal. No respiratory distress.      Breath sounds: Normal breath sounds.   Abdominal:      General: Bowel sounds are normal.      Palpations: Abdomen is soft.      Tenderness: There is no abdominal tenderness.   Musculoskeletal:      Right lower leg: Edema present.      Left lower leg: Edema present.   Skin:     General: Skin is warm and dry.   Neurological:      General: No focal deficit present.      Mental Status: He is alert and oriented to person, place, and time.   Psychiatric:         Behavior: Behavior normal. Behavior is cooperative.         Consultants     Consult Orders (all) (From admission, onward)     Start     Ordered    12/10/21 1941  Inpatient Gastroenterology Consult  Once        Specialty:  Gastroenterology  Provider:  Anthony Russo MD    12/10/21 1940    12/10/21 1404  Inpatient Pulmonology Consult  Once        Specialty:  Pulmonary Disease  Provider:  Chan Resendez MD    12/10/21 1403    12/08/21 1631  Inpatient Cardiology Consult   Once        Specialty:  Cardiology  Provider:  Sara Heaton MD    21 1631    21 0906  Inpatient Case Management  Consult  Once        Provider:  (Not yet assigned)    21 0906              Procedures     Imaging Results (All)     Procedure Component Value Units Date/Time    CT Angiogram Chest With & Without Contrast [[id number]] Collected: 21 1244     Updated: 21 1434    Narrative:      CT ANGIOGRAPHY CHEST WITH INTRAVENOUS CONTRAST AND 3-D RECONSTRUCTIONS     HISTORY: Shortness of breath. Elevated D-dimer.     TECHNIQUE/FINDINGS: The CT scan was performed through the chest with CT  angiography protocol using intravenous contrast and 3-D reconstructions  and demonstrates the followin. The examination is somewhat limited by the degree of contrast  enhancement and the patient's large size. There is no evidence of  pulmonary embolus in the main pulmonary arteries or secondary branches.  The visualized 3rd degree branches are also unremarkable but less  opacified. The thoracic aorta shows no evidence of aneurysm or  dissection.  2. The lungs are well-expanded and clear. There is no mediastinal or  hilar or axillary adenopathy. There is no pericardial effusion.  3. The CT images through the upper liver are limited by the timing of  contrast and the suspicious lesion in the dome of liver noted on the  abdominal CT scan performed 2 days ago is not well demonstrated. Again  noted is prominent enlargement of the caudate lobe of the liver.       CT Abdomen With & Without Contrast [[id number]] Collected: 12/10/21 185     Updated: 12/10/21 2033    Narrative:      CT SCAN OF THE ABDOMEN WITHOUT AND WITH INTRAVENOUS CONTRAST WITH  PANCREATIC MASS PROTOCOL     HISTORY: Possible pancreatic mass noted on ultrasound. Patient has  history of alcohol induced hepatitis. Ascites.     TECHNIQUE: The CT scan was performed through the abdomen without and  with intravenous contrast  with pancreatic mass protocol and thin  sections through the pancreas.      FINDINGS: The following findings are present:  1. The pancreas appears normal. However there is an adjacent mass which  relates to prominent enlargement of the caudate lobe of the liver which  can often be associated with cirrhosis.  2. In addition, there is a somewhat ill-defined vaguely low density  lesion in the dome of the liver measuring up to 3.5 x 4.6 cm. The  remainder of the liver has a somewhat heterogeneous texture and the  findings do raise the concern of a primary liver tumor such as  hepatocellular carcinoma. Further evaluation with MRI scan and possible  CT-guided biopsy is therefore recommended.  3. The spleen is not enlarged. There is no portal vein thrombosis. The  gallbladder shows no definite gallstones and there is no wall  thickening. The adrenal glands and both kidneys are unremarkable.  4. There is no aortic aneurysm or retroperitoneal lymphadenopathy. The  visualized large and small bowel loops appear normal.       US Abdomen Complete [[id number]] Collected: 12/09/21 0758     Updated: 12/09/21 0812    Narrative:      US ABDOMEN COMPLETE-     Clinical: Alcoholic hepatitis, ascites     No: Technically difficult examination due to large body habitus     Both kidneys were somewhat difficult to image. The right kidney is 11.4  cm in length and left kidney is 13.4 cm in length. No obstructive  uropathy or mass identified. Renal cortical echotexture within normal  limits.     There is a 1 cm mobile gallstone demonstrated. In addition there is  adenomyomatosis of the gallbladder. No biliary duct dilatation CBD is 5  mm. No pericholecystic fluid or gallbladder wall thickening seen.     The abdominal aorta is partially visualized 2.4 cm in maximum diameter.  Visualized inferior vena cava within normal limits. Splenic size upper  limits of normal measuring 12.9 cm in maximum length. No free fluid is  demonstrated within the  upper abdomen.     The pancreas cannot be adequately visualized. Obscured due to gas within  the overlying stomach. There is however a masslike area in the  generalized area of the pancreatic head measuring 5.3 x 4.4 x 4.4 cm.  Pancreatic head mass or enlarged caudate lobe of the liver are both  possibilities. Computed tomography recommended as follow-up.     Hepatic echotexture is heterogenous. Hepatic size normal to slightly  smaller than anticipated. The overall hepatic echotexture is greater  than anticipated, suggesting fatty infiltration. Areas of fatty sparing  noted. No focal liver lesion seen.     CONCLUSION:  1. Indeterminate masslike area in the general vicinity of the pancreatic  head, dedicated pancreatic protocol CT is recommended as follow-up.  2. Hepatic echotexture is heterogenous, overall greater than anticipated  consistent with areas of fatty infiltration. No focal liver lesion seen.  3. No free fluid within the upper abdomen.  4. Splenic size upper limits of normal.  5. Gallstone with adenomyomatosis of the gallbladder.     This report was finalized on 12/9/2021 8:09 AM by Dr. Darryl Dallas M.D.       XR Chest 1 View [[id number]] Collected: 12/07/21 1840     Updated: 12/07/21 1844    Narrative:      AP CHEST     HISTORY: Swelling to abdomen and lower extremity     COMPARISON: None     FINDINGS: Lung fields appear clear. Heart size probably normal for  technique. No appreciable pneumothorax. No acute bony abnormality.       Impression:      No acute findings     This report was finalized on 12/7/2021 6:41 PM by Dr. Bryan Houston M.D.           Results for orders placed during the hospital encounter of 12/07/21    Duplex Venous Lower Extremity - Bilateral CAR    Interpretation Summary  · Normal bilateral lower extremity venous duplex scan.    Results for orders placed during the hospital encounter of 12/07/21    Adult Transthoracic Echo Complete W/ Cont if Necessary Per  Protocol    Interpretation Summary  · Left ventricular wall thickness is consistent with mild concentric hypertrophy.  · Estimated left ventricular EF = 57% Left ventricular systolic function is normal.  · Left ventricular diastolic function was indeterminate.  · Mildly reduced right ventricular systolic function noted.    Pertinent Labs     Results from last 7 days   Lab Units 12/12/21  0706 12/11/21  0748 12/10/21  0735 12/09/21  0518   WBC 10*3/mm3 5.32 6.58 6.81 7.34   HEMOGLOBIN g/dL 16.0 15.6 15.2 15.1   PLATELETS 10*3/mm3 313 265 292 296     Results from last 7 days   Lab Units 12/13/21  1003 12/12/21  0706 12/11/21  0748 12/10/21  0735   SODIUM mmol/L 134* 135* 138 137   POTASSIUM mmol/L 4.5 4.0 3.9 4.5   CHLORIDE mmol/L 93* 88* 86* 88*   CO2 mmol/L 32.8* 39.4* 42.0* 40.5*   BUN mg/dL 19 20 13 11   CREATININE mg/dL 0.95 1.12 0.89 0.91   GLUCOSE mg/dL 116* 96 96 96   EGFR IF AFRICN AM mL/min/1.73 106 88 115 112     Results from last 7 days   Lab Units 12/13/21  1003 12/12/21  0706 12/11/21  0748 12/10/21  0735   ALBUMIN g/dL 3.60 3.10* 3.20* 3.10*   BILIRUBIN mg/dL 0.9 1.0 1.0 0.7   ALK PHOS U/L 96 90 91 92   AST (SGOT) U/L 58* 45* 34 46*   ALT (SGPT) U/L 44* 33 34 41     Results from last 7 days   Lab Units 12/13/21  1003 12/12/21  0706 12/11/21  0748 12/10/21  0735 12/09/21  0518 12/08/21  0828 12/07/21  1749 12/07/21  1749   CALCIUM mg/dL 9.9 9.7 9.4 9.6   < > 9.3   < > 9.1   ALBUMIN g/dL 3.60 3.10* 3.20* 3.10*   < >  --   --  3.70   MAGNESIUM mg/dL  --   --   --   --   --  2.2  --  2.2   PHOSPHORUS mg/dL  --   --   --   --   --  5.3*  --   --     < > = values in this interval not displayed.       Results from last 7 days   Lab Units 12/10/21  1550 12/10/21  0735 12/07/21  1749   TROPONIN T ng/mL  --  0.018 0.011   PROBNP pg/mL  --   --  1,374.0*   D DIMER QUANT MCGFEU/mL 2.61*  --   --      Results from last 7 days   Lab Units 12/08/21  0043   CREATININE UR mg/dL 11.8   PROTEIN TOTAL URINE mg/dL <4.0      Results from last 7 days   Lab Units 12/07/21  1749   CHOLESTEROL mg/dL 133   TRIGLYCERIDES mg/dL 81   HDL CHOL mg/dL 40   LDL CHOL mg/dL 77     Results from last 7 days   Lab Units 12/11/21  0748   PROTIME Seconds 13.7   INR  1.07     ALPHA-FETOPROTEIN 1.18     CEA 2.29     CA 19-9 20.7     Hepatitis B Surface Ag Non-Reactive   Hep A IgM Non-Reactive   Hep B C IgM Non-Reactive   Hepatitis C Ab Non-Reactive       Test Results Pending at Discharge     Pending Labs     Order Current Status    Anti-Smooth Muscle Antibody Titer In process          Discharge Details        Discharge Medications      New Medications      Instructions Start Date   furosemide 40 MG tablet  Commonly known as: LASIX   40 mg, Oral, 2 Times Daily      lisinopril 40 MG tablet  Commonly known as: PRINIVIL,ZESTRIL   40 mg, Oral, Daily      metoprolol tartrate 50 MG tablet  Commonly known as: LOPRESSOR   50 mg, Oral, 2 Times Daily      zolpidem 5 MG tablet  Commonly known as: Ambien   Bring to sleep lab DO NOT use at home. PLEASE take if not asleep within 30 mins of start of study.             No Known Allergies    Discharge Disposition:  Home or Self Care      Discharge Diet:  Diet Order   Procedures   • Diet Regular; Thin; Low Sodium; 2,000 mg Na       Discharge Activity:   Activity Instructions     Activity as Tolerated            CODE STATUS:    Code Status and Medical Interventions:   Ordered at: 12/08/21 0116     Code Status (Patient has no pulse and is not breathing):    CPR (Attempt to Resuscitate)     Medical Interventions (Patient has pulse or is breathing):    Full Support       Future Appointments   Date Time Provider Department Center   12/17/2021 10:30 AM Jessie Almodovar III, NP-C [facility]     Additional Instructions for the Follow-ups that You Need to Schedule     COVID-19,[facility],NP/OP Swab in Transport Media or ESwab 72 HR TAT - Swab, Nasopharynx    Dec 11, 2021 (Approximate)      Order Urgency: Routine     Release to patient: Immediate    Previously tested for COVID-19?: No    Employed in healthcare setting?: No    Symptomatic for COVID-19 as defined by CDC?: No    Hospitalized for COVID-19?: No    Admitted to ICU for COVID-19?: No    Resident in a congregate (group) care setting?: No            Follow-up Information     Provider, No Known Follow up in 2 week(s).    Contact information:  [id number] [phone]             Tripp Macdonald MD Follow up.    Specialties: Pulmonary Disease, Sleep Medicine  Contact information:  [address] [facility] [facility] [address]  [place] [address]  [phone]             Dennys Moreno MD Follow up.    Specialty: Cardiology  Contact information:  [address]  [place] [address]  [phone]             Bong Rios MD Follow up.    Specialty: Gastroenterology  Contact information:  [address] [facility] [address]  [place] [id number] [phone]                         Additional Instructions for the Follow-ups that You Need to Schedule     COVID-19,[facility],NP/OP Swab in Transport Media or ESwab 72 HR TAT - Swab, Nasopharynx    Dec 11, 2021 (Approximate)      Order Urgency: Routine    Release to patient: Immediate    Previously tested for COVID-19?: No    Employed in healthcare setting?: No    Symptomatic for COVID-19 as defined by CDC?: No    Hospitalized for COVID-19?: No    Admitted to ICU for COVID-19?: No    Resident in a congregate (group) care setting?: No           Time Spent on Discharge:  Greater than 30 minutes      Abrahan Godwin MD  [facility]  12/13/21  10:36 EST                Electronically signed by Abrahan Godwin MD at 12/13/21 1211

## 2021-12-13 NOTE — PROGRESS NOTES
"[facility]    Patient Name: Richard Rodrigez  :1980  41 y.o.  LOS: 6  Encounter Provider: Tyler Celaya Jr, MD      Patient Care Team:  Provider, No Known as PCP - General    Chief Complaint: Follow-up acute on chronic diastolic heart failure, RV dysfunction, respiratory failure, morbid obesity, suspected sleep apnea    Interval History: No acute issues overnight.  I's and O's are inaccurate.  Patient is down 38 pounds from admission.       Objective   Vital Signs  Temp:  [98.3 °F (36.8 °C)-99 °F (37.2 °C)] 98.4 °F (36.9 °C)  Heart Rate:  [] 102  Resp:  [16-18] 16  BP: (114-135)/(64-89) 124/78    Intake/Output Summary (Last 24 hours) at 2021 1257  Last data filed at 2021 0918  Gross per 24 hour   Intake 360 ml   Output --   Net 360 ml     Flowsheet Rows      First Filed Value   Admission Height 182.9 cm (72\") Documented at 2021 0822   Admission Weight 170 kg (375 lb) Documented at 2021 2237            Vitals reviewed.   Constitutional:       Appearance: Healthy appearance. Not in distress.   Neck:      Vascular: No JVR. JVD normal.   Pulmonary:      Effort: Pulmonary effort is normal.      Breath sounds: No wheezing. No rhonchi. No rales.      Comments: Minimal bibasilar crackles  Chest:      Chest wall: Not tender to palpatation.   Cardiovascular:      PMI at left midclavicular line. Normal rate. Regular rhythm. Normal S1. Normal S2.      Murmurs: There is no murmur.      No gallop. No click. No rub.   Pulses:     Intact distal pulses.   Edema:     Peripheral edema present.  Abdominal:      General: Bowel sounds are normal.      Palpations: Abdomen is soft.      Tenderness: There is no abdominal tenderness.   Musculoskeletal: Normal range of motion.         General: No tenderness. Skin:     General: Skin is warm and dry.   Neurological:      General: No focal deficit present.      Mental Status: Alert and oriented to person, place and time. "           Pertinent Test Results:  Results from last 7 days   Lab Units 12/13/21  1003 12/12/21  0706 12/11/21  0748 12/10/21  0735 12/09/21  0518 12/08/21  0828 12/07/21  1749   SODIUM mmol/L 134* 135* 138 137 138 136 138   POTASSIUM mmol/L 4.5 4.0 3.9 4.5 4.3 4.6 4.6   CHLORIDE mmol/L 93* 88* 86* 88* 92* 98 98   CO2 mmol/L 32.8* 39.4* 42.0* 40.5* 39.3* 34.1* 30.7*   BUN mg/dL 19 20 13 11 11 14 14   CREATININE mg/dL 0.95 1.12 0.89 0.91 0.84 0.95 1.01   GLUCOSE mg/dL 116* 96 96 96 123* 148* 86   CALCIUM mg/dL 9.9 9.7 9.4 9.6 9.4 9.3 9.1   AST (SGOT) U/L 58* 45* 34 46* 47*  --  60*   ALT (SGPT) U/L 44* 33 34 41 51*  --  60*     Results from last 7 days   Lab Units 12/10/21  0735 12/07/21  1749   TROPONIN T ng/mL 0.018 0.011     Results from last 7 days   Lab Units 12/12/21  0706 12/11/21  0748 12/10/21  0735 12/09/21  0518 12/08/21  0828 12/07/21  1749   WBC 10*3/mm3 5.32 6.58 6.81 7.34 6.54 6.45   HEMOGLOBIN g/dL 16.0 15.6 15.2 15.1 14.7 14.9   HEMATOCRIT % 52.4* 52.9* 51.1* 52.4* 50.0 49.3   PLATELETS 10*3/mm3 313 265 292 296 292 281     Results from last 7 days   Lab Units 12/11/21  0748   INR  1.07     Results from last 7 days   Lab Units 12/08/21  0828 12/07/21  1749   MAGNESIUM mg/dL 2.2 2.2     Results from last 7 days   Lab Units 12/07/21  1749   CHOLESTEROL mg/dL 133   TRIGLYCERIDES mg/dL 81   HDL CHOL mg/dL 40     Results from last 7 days   Lab Units 12/07/21  1749   PROBNP pg/mL 1,374.0*     Results from last 7 days   Lab Units 12/07/21  1749   TSH uIU/mL 3.040           Medication Review:   furosemide, 40 mg, Oral, BID  lisinopril, 20 mg, Oral, Q12H  metoprolol tartrate, 50 mg, Oral, Q12H  potassium chloride, 20 mEq, Oral, Daily              Assessment/Plan   1. Acute on chronic diastolic heart failure -volume status is much improved.  Continue furosemide 40 mg twice daily.  Continue potassium replacement.  Blood pressure is well controlled.  Patient will need outpatient sleep study and ongoing aggressive  optimization of afterload reduction.  2. RV dysfunction -overall etiology is uncertain.  Likely met multifactorial.  Definitely needs outpatient sleep study.  Continue to optimize volume status.  3. Sinus tachycardia -multifactorial.  Continue beta-blocker.  4. Pancreatic mass -ongoing work-up and management per internal medicine  5. Chronic respiratory failure -patient does not have oxygen supplementation needs per nursing report -6-minute walk.    Cardiac issues are fairly stable at this point and can be optimized in the outpatient setting.  Patient is stable for discharge and we will make sure he has adequate cardiology follow-up.    Tyler Celaya Jr, MD  [facility]  12/13/21  12:57 EST

## 2021-12-14 ENCOUNTER — READMISSION MANAGEMENT (OUTPATIENT)
Dept: CALL CENTER | Facility: HOSPITAL | Age: 41
End: 2021-12-14

## 2021-12-14 ENCOUNTER — TRANSITIONAL CARE MANAGEMENT TELEPHONE ENCOUNTER (OUTPATIENT)
Dept: CALL CENTER | Facility: HOSPITAL | Age: 41
End: 2021-12-14

## 2021-12-14 LAB — ACTIN IGG SERPL-ACNC: 19 UNITS (ref 0–19)

## 2021-12-14 NOTE — OUTREACH NOTE
Prep Survey      Responses   [facility] patient discharged from? [place]   Is LACE score < 7 ? No   Emergency Room discharge w/ pulse ox? No   Eligibility [facility]   Date of Admission 12/07/21   Date of Discharge 12/13/21   Discharge diagnosis **Anasarca    Does the patient have one of the following disease processes/diagnoses(primary or secondary)? CHF   Does the patient have Home health ordered? No   Is there a DME ordered? No   Medication alerts for this patient Lasix    Prep survey completed? Yes          Jacqui Clarke RN

## 2021-12-23 ENCOUNTER — OFFICE VISIT (OUTPATIENT)
Dept: CARDIOLOGY | Facility: CLINIC | Age: 41
End: 2021-12-23

## 2021-12-23 ENCOUNTER — LAB (OUTPATIENT)
Dept: LAB | Facility: HOSPITAL | Age: 41
End: 2021-12-23

## 2021-12-23 VITALS
OXYGEN SATURATION: 96 % | SYSTOLIC BLOOD PRESSURE: 118 MMHG | HEIGHT: 72 IN | WEIGHT: 315 LBS | DIASTOLIC BLOOD PRESSURE: 76 MMHG | BODY MASS INDEX: 42.66 KG/M2 | HEART RATE: 102 BPM

## 2021-12-23 DIAGNOSIS — I10 PRIMARY HYPERTENSION: ICD-10-CM

## 2021-12-23 DIAGNOSIS — R29.818 SUSPECTED SLEEP APNEA: ICD-10-CM

## 2021-12-23 DIAGNOSIS — I10 PRIMARY HYPERTENSION: Primary | ICD-10-CM

## 2021-12-23 DIAGNOSIS — E66.01 MORBID OBESITY WITH BMI OF 50.0-59.9, ADULT (HCC): ICD-10-CM

## 2021-12-23 LAB
ANION GAP SERPL CALCULATED.3IONS-SCNC: 11.8 MMOL/L (ref 5–15)
BUN SERPL-MCNC: 24 MG/DL (ref 6–20)
BUN/CREAT SERPL: 21.6 (ref 7–25)
CALCIUM SPEC-SCNC: 9.6 MG/DL (ref 8.6–10.5)
CHLORIDE SERPL-SCNC: 100 MMOL/L (ref 98–107)
CO2 SERPL-SCNC: 27.2 MMOL/L (ref 22–29)
CREAT SERPL-MCNC: 1.11 MG/DL (ref 0.76–1.27)
GFR SERPL CREATININE-BSD FRML MDRD: 89 ML/MIN/1.73
GLUCOSE SERPL-MCNC: 97 MG/DL (ref 65–99)
POTASSIUM SERPL-SCNC: 4.5 MMOL/L (ref 3.5–5.2)
SODIUM SERPL-SCNC: 139 MMOL/L (ref 136–145)

## 2021-12-23 PROCEDURE — 80048 BASIC METABOLIC PNL TOTAL CA: CPT

## 2021-12-23 PROCEDURE — 99214 OFFICE O/P EST MOD 30 MIN: CPT | Performed by: NURSE PRACTITIONER

## 2021-12-23 PROCEDURE — 36415 COLL VENOUS BLD VENIPUNCTURE: CPT

## 2021-12-23 RX ORDER — LISINOPRIL 40 MG/1
40 TABLET ORAL DAILY
Qty: 90 TABLET | Refills: 3 | Status: SHIPPED | OUTPATIENT
Start: 2021-12-23 | End: 2023-01-27 | Stop reason: SDUPTHER

## 2021-12-23 RX ORDER — METOPROLOL TARTRATE 50 MG/1
50 TABLET, FILM COATED ORAL 2 TIMES DAILY
Qty: 180 TABLET | Refills: 3 | Status: SHIPPED | OUTPATIENT
Start: 2021-12-23 | End: 2023-01-04

## 2021-12-23 RX ORDER — FUROSEMIDE 40 MG/1
40 TABLET ORAL 2 TIMES DAILY
Qty: 180 TABLET | Refills: 3 | Status: SHIPPED | OUTPATIENT
Start: 2021-12-23 | End: 2023-01-04

## 2021-12-23 NOTE — PROGRESS NOTES
"    CARDIOLOGY        Patient Name: Richard Rodrigez  :1980  Age: 41 y.o.  Primary Cardiologist: Dennys Moreno MD  Encounter Provider:  QUIQUE Pisano    Date of Service: 21          CHIEF COMPLAINT / REASON FOR OFFICE VISIT     Congestive Heart Failure (BHE 1 wk f/u)      HISTORY OF PRESENT ILLNESS       HPI  Richard Rodrigez is a 41 y.o. male who presents today for 3-week reevaluation.     Pt has a  history significant for hypertension, tobacco abuse, alcohol abuse.    Review of medical records reveals patient hospitalized -2021.  Patient presented with generalized swelling.  Echocardiogram revealed preserved LVEF.  Anasarca was not felt to be secondary to heart failure.  Patient's antihypertensive regimen was resumed.  Patient was diuresed and lost over 30 pounds.    Patient states that he is doing well since discharge from from hospital.  He reports that he is back to work as a  and is not having any exertional symptoms.  He is doing well on all of his medications.  He states that he is not swelling. He is tolerating all medications without difficulty.  He has not had blood work since release from the hospital.  Currently denies chest pain, shortness of breath at rest or with exertion, orthopnea, edema, fatigue.      The following portions of the patient's history were reviewed and updated as appropriate: allergies, current medications, past family history, past medical history, past social history, past surgical history and problem list.      VITAL SIGNS     Visit Vitals  /76 (BP Location: Left arm, Patient Position: Sitting, Cuff Size: Large Adult)   Pulse 102   Ht 182.9 cm (72\")   Wt (!) 152 kg (335 lb)   SpO2 96%   BMI 45.43 kg/m²         Wt Readings from Last 3 Encounters:   21 (!) 152 kg (335 lb)   21 (!) 153 kg (337 lb)     Body mass index is 45.43 kg/m².      REVIEW OF SYSTEMS   Review of Systems   Constitutional: Negative for chills, fever, weight gain " and weight loss.   Cardiovascular: Negative for leg swelling.   Respiratory: Negative for cough, snoring and wheezing.    Hematologic/Lymphatic: Negative for bleeding problem. Does not bruise/bleed easily.   Skin: Negative for color change.   Musculoskeletal: Negative for falls, joint pain and myalgias.   Gastrointestinal: Negative for melena.   Genitourinary: Negative for hematuria.   Neurological: Negative for excessive daytime sleepiness.   Psychiatric/Behavioral: Negative for depression. The patient is not nervous/anxious.            PHYSICAL EXAMINATION     Constitutional:       Appearance: Normal appearance. Well-developed. Morbidly obese.   Eyes:      Conjunctiva/sclera: Conjunctivae normal.   Neck:      Vascular: No carotid bruit.   Pulmonary:      Effort: Pulmonary effort is normal.      Breath sounds: Normal breath sounds.   Cardiovascular:      Normal rate. Regular rhythm. Normal S1. Normal S2.      Murmurs: There is no murmur.      No gallop. No click. No rub.   Edema:     Peripheral edema absent.   Musculoskeletal: Normal range of motion. Skin:     General: Skin is warm and dry.   Neurological:      Mental Status: Alert and oriented to person, place, and time.      GCS: GCS eye subscore is 4. GCS verbal subscore is 5. GCS motor subscore is 6.   Psychiatric:         Speech: Speech normal.         Behavior: Behavior normal.         Thought Content: Thought content normal.         Judgment: Judgment normal.           REVIEWED DATA     Procedures    Cardiac Procedures:  1. Echocardiogram 12/9/2021.  LVEF 57%.  Mild LVH.  Diastolic function indeterminate.  Mildly reduced RV systolic function.      Lipid Panel    Lipid Panel 12/7/21   Total Cholesterol 133   Triglycerides 81   HDL Cholesterol 40   VLDL Cholesterol 16   LDL Cholesterol  77   LDL/HDL Ratio 1.92           BUN   Date Value Ref Range Status   12/13/2021 19 6 - 20 mg/dL Final     Creatinine   Date Value Ref Range Status   12/13/2021 0.95 0.76 -  1.27 mg/dL Final     Potassium   Date Value Ref Range Status   12/13/2021 4.5 3.5 - 5.2 mmol/L Final     ALT (SGPT)   Date Value Ref Range Status   12/13/2021 44 (H) 1 - 41 U/L Final     AST (SGOT)   Date Value Ref Range Status   12/13/2021 58 (H) 1 - 40 U/L Final           ASSESSMENT & PLAN      Diagnosis Plan   1. Primary hypertension  Basic Metabolic Panel   2. Morbid obesity with BMI of 50.0-59.9, adult (HCC)     3. Suspected sleep apnea           SUMMARY/DISCUSSION  1. Hypertension.  Blood pressure is much well controlled at 118/76.  Patient will continue current regimen of lisinopril 40 mg/day, furosemide 40 mg twice per day, metoprolol tartrate 50 mg twice per day.  He is in need of repeat BMP with the addition of lisinopril and furosemide to ensure kidney function and electrolytes are stable, he will go have this drawn at the hospital today.  He currently denies any dyspnea with exertion or weight gain.  He is back to work as a  where he is not having exertional symptoms.  2. Obesity  3. Suspected sleep apnea  4. Follow-up with Dr. Moreno in 4 months.  Sooner for problems or complications.        MEDICATIONS         Discharge Medications          Accurate as of December 23, 2021  2:38 PM. If you have any questions, ask your nurse or doctor.            Continue These Medications      Instructions Start Date   furosemide 40 MG tablet  Commonly known as: LASIX   40 mg, Oral, 2 Times Daily      lisinopril 40 MG tablet  Commonly known as: PRINIVIL,ZESTRIL   40 mg, Oral, Daily      metoprolol tartrate 50 MG tablet  Commonly known as: LOPRESSOR   50 mg, Oral, 2 Times Daily         Stop These Medications    zolpidem 5 MG tablet  Commonly known as: Ambien  Stopped by: QUIQUE Pisano                **Dragon Disclaimer:   Much of this encounter note is an electronic transcription/translation of spoken language to printed text. The electronic translation of spoken language may permit erroneous, or at times,  nonsensical words or phrases to be inadvertently transcribed. Although I have reviewed the note for such errors, some may still exist.

## 2021-12-27 ENCOUNTER — TELEPHONE (OUTPATIENT)
Dept: CARDIOLOGY | Facility: CLINIC | Age: 41
End: 2021-12-27

## 2021-12-27 NOTE — TELEPHONE ENCOUNTER
VM X1        ----- Message from QUIQUE Pisano sent at 12/27/2021  9:03 AM EST -----  Please inform of normal lab results.

## 2022-01-10 ENCOUNTER — OFFICE VISIT (OUTPATIENT)
Dept: INTERNAL MEDICINE | Facility: CLINIC | Age: 42
End: 2022-01-10

## 2022-01-10 VITALS
HEIGHT: 72 IN | SYSTOLIC BLOOD PRESSURE: 118 MMHG | HEART RATE: 70 BPM | TEMPERATURE: 97.8 F | OXYGEN SATURATION: 97 % | DIASTOLIC BLOOD PRESSURE: 74 MMHG | BODY MASS INDEX: 42.66 KG/M2 | WEIGHT: 315 LBS

## 2022-01-10 DIAGNOSIS — R16.0 LIVER MASS: ICD-10-CM

## 2022-01-10 DIAGNOSIS — Z76.89 ENCOUNTER TO ESTABLISH CARE: Primary | ICD-10-CM

## 2022-01-10 DIAGNOSIS — Z20.2 STD EXPOSURE: ICD-10-CM

## 2022-01-10 DIAGNOSIS — I10 ESSENTIAL HYPERTENSION: ICD-10-CM

## 2022-01-10 DIAGNOSIS — E66.01 MORBID (SEVERE) OBESITY DUE TO EXCESS CALORIES: ICD-10-CM

## 2022-01-10 DIAGNOSIS — F17.200 TOBACCO USE DISORDER: ICD-10-CM

## 2022-01-10 DIAGNOSIS — R29.818 SUSPECTED SLEEP APNEA: ICD-10-CM

## 2022-01-10 PROBLEM — J96.02 ACUTE RESPIRATORY FAILURE WITH HYPOXIA AND HYPERCAPNIA: Status: RESOLVED | Noted: 2021-12-10 | Resolved: 2022-01-10

## 2022-01-10 PROBLEM — J96.01 ACUTE RESPIRATORY FAILURE WITH HYPOXIA AND HYPERCAPNIA (HCC): Status: RESOLVED | Noted: 2021-12-10 | Resolved: 2022-01-10

## 2022-01-10 PROCEDURE — 99214 OFFICE O/P EST MOD 30 MIN: CPT | Performed by: NURSE PRACTITIONER

## 2022-01-10 NOTE — PROGRESS NOTES
Chief Complaint  Establish Care, Exposure to STD, and Hypertension     Subjective:      History of Present Illness {CC  Problem List  Visit  Diagnosis   Encounters  Notes  Medications  Labs  Result Review Imaging  Media :23}     Richard Rodrigez presents to [facility] to establish care.     He is new to me.   Review of chart: He was admitted to [facility] ER on 12/7/21 with edema from abdomen to feet with decreased urine output. FC placed and 800 ml urine out.   He was admitted for anasarca. EF was normal.   Pulmonary evaluated: pulmonary hypertension and obesity hypoventilation syndrome.   He was aggressively diuresed and improved with removing over 30 lbs.   He has appointment scheduled for Sleep study.     CT abdomen showed liver mass in dome of liver measuring up to 3.5 x 4.6 cm .  MRI was needed as US was not conclusive however patient required MRI for larger body size that was not available.        He was discharged on 12/13/21    Since discharge, he states he is doing well.  He is back to work.  Denies SOA or CP.  Voiding well. Attempting to quit smoking.  He denies unexplained weight loss, change in appetite, change in bowels.   Dr Rios contacted me about this patient prior to appointment.  Advised MRI.        He had follow up with cardiology on 12/23/21.  No changes.  Labs repeated: Na 139, K 4.5, Cr 1.11    Patient has a new concern: States he had sexual encounter a few weeks ago that started out with condom but not for duration.  Partner recently contacted him and advised she had STD and he would like to be screened.  Denies any symptoms.       I have reviewed patient's medical history, any new submitted information provided by patient or medical assistant and updated medical record.      Objective:      Physical Exam  Vitals reviewed.   Constitutional:       Appearance: Normal appearance. He is well-developed. He is obese.   HENT:      Head:      Comments:  Wearing mask due to COVID   Eyes:      General: No scleral icterus.  Neck:      Thyroid: No thyromegaly.   Cardiovascular:      Rate and Rhythm: Normal rate and regular rhythm.      Pulses: Normal pulses.      Heart sounds: Normal heart sounds.   Pulmonary:      Effort: Pulmonary effort is normal.      Breath sounds: Normal breath sounds.      Comments: E/U   Abdominal:      General: Bowel sounds are normal.      Palpations: Abdomen is soft. There is no mass.   Musculoskeletal:         General: Normal range of motion.      Cervical back: Normal range of motion and neck supple.      Right lower leg: No edema.      Left lower leg: No edema.   Lymphadenopathy:      Cervical: No cervical adenopathy.   Skin:     General: Skin is warm and dry.      Capillary Refill: Capillary refill takes 2 to 3 seconds.      Coloration: Skin is not jaundiced.   Neurological:      Mental Status: He is alert and oriented to person, place, and time.   Psychiatric:         Mood and Affect: Mood normal.         Behavior: Behavior normal. Behavior is cooperative.         Thought Content: Thought content normal.         Judgment: Judgment normal.        Result Review  Data Reviewed:{ Labs  Result Review  Imaging  Med Tab  Media :23}     The following data was reviewed by: Jessie Almodovar III, NP-C on 01/10/2022  Common labs    Common Labsle 12/12/21 12/12/21 12/13/21 12/23/21    0706 [id number]     Glucose  96 116 (A) 97   BUN  20 19 24 (A)   Creatinine  1.12 0.95 1.11   eGFR African Am  88 106 89   Sodium  135 (A) 134 (A) 139   Potassium  4.0 4.5 4.5   Chloride  88 (A) 93 (A) 100   Calcium  9.7 9.9 9.6   Albumin  3.10 (A) 3.60    Total Bilirubin  1.0 0.9    Alkaline Phosphatase  90 96    AST (SGOT)  45 (A) 58 (A)    ALT (SGPT)  33 44 (A)    WBC 5.32      Hemoglobin 16.0      Hematocrit 52.4 (A)      Platelets 313      (A) Abnormal value       Comments are available for some flowsheets but are not being displayed.               "    Vital Signs:   /74 (BP Location: Left arm, Patient Position: Sitting, Cuff Size: Adult)   Pulse 70   Temp 97.8 °F (36.6 °C) (Temporal)   Ht 182.9 cm (72\")   Wt (!) 147 kg (324 lb)   SpO2 97%   BMI 43.94 kg/m²         Requested Prescriptions      No prescriptions requested or ordered in this encounter       Routine medications provided by this office will also be refilled via pharmacy request.       Current Outpatient Medications:   •  furosemide (LASIX) 40 MG tablet, Take 1 tablet by mouth 2 (Two) Times a Day., Disp: 180 tablet, Rfl: 3  •  lisinopril (PRINIVIL,ZESTRIL) 40 MG tablet, Take 1 tablet by mouth Daily., Disp: 90 tablet, Rfl: 3  •  metoprolol tartrate (LOPRESSOR) 50 MG tablet, Take 1 tablet by mouth 2 (Two) Times a Day., Disp: 180 tablet, Rfl: 3     Assessment and Plan:      Assessment and Plan {CC Problem List  Visit Diagnosis  ROS  Review (Popup)  Health Maintenance  Quality  BestPractice  Medications  SmartSets  SnapShot Encounters  Media :23}     Problem List Items Addressed This Visit        Cardiac and Vasculature    Essential hypertension    Overview     Current treatment:  Lasix 40 mg, lisinopril 40 mg, lopressor 50 mg bid.          Current Assessment & Plan     Hypertension is improving with treatment.  Continue current treatment regimen.  Dietary sodium restriction.  Weight loss.  Regular aerobic exercise.  Stop smoking.  Blood pressure will be reassessed at the next regular appointment.            Endocrine and Metabolic    Morbid (severe) obesity due to excess calories (HCC)    Current Assessment & Plan     Patient's (Body mass index is 43.94 kg/m².) indicates that they are morbidly obese (BMI > 40 or > 35 with obesity - related health condition) with health conditions that include obstructive sleep apnea and hypertension . Weight is improving with lifestyle modifications. BMI is is above average; BMI management plan is completed. We discussed low calorie, low carb " based diet program, portion control and increasing exercise.             Gastrointestinal Abdominal     Liver mass    Relevant Orders    MRI abdomen w wo contrast       Neuro    Suspected sleep apnea    Current Assessment & Plan     He has sleep study scheduled.             Tobacco    Tobacco use disorder (Chronic)    Current Assessment & Plan     Working on quitting himself.   Does not want assistance at this time.            Other Visit Diagnoses     Encounter to establish care    -  Primary    STD exposure        Relevant Orders    HIV-1 / O / 2 Ag / Antibody 4th Generation    RPR    Chlamydia trachomatis, Neisseria gonorrhoeae, Trichomonas vaginalis, PCR - Swab, Urine, Clean Catch        He states he has not schedule follow up appointment with Dr Rios.  He states he has his number and will contact him to schedule.     I spent 46 minutes caring for Richard on this date of service. This time includes time spent by me in the following activities: preparing for the visit, reviewing tests, obtaining and/or reviewing a separately obtained history, performing a medically appropriate examination and/or evaluation, counseling and educating the patient/family/caregiver, ordering medications, tests, or procedures, referring and communicating with other health care professionals, documenting information in the medical record, independently interpreting results and communicating that information with the patient/family/caregiver and care coordination    Follow Up {Instructions Charge Capture  Follow-up Communications :23}     Return in about 6 months (around 7/10/2022) for Annual physical.    Patient was given instructions and counseling regarding his condition or for health maintenance advice. Please see specific information pulled into the AVS if appropriate.    Jenn disclaimer:   Much of this encounter note is an electronic transcription/translation of spoken language to printed text. The electronic translation of  spoken language may permit erroneous, or at times, nonsensical words or phrases to be inadvertently transcribed; Although I have reviewed the note for such errors, some may still exist.     Additional Patient Counseling:       There are no Patient Instructions on file for this visit.

## 2022-01-10 NOTE — ASSESSMENT & PLAN NOTE
Patient's (Body mass index is 43.94 kg/m².) indicates that they are morbidly obese (BMI > 40 or > 35 with obesity - related health condition) with health conditions that include obstructive sleep apnea and hypertension . Weight is improving with lifestyle modifications. BMI is is above average; BMI management plan is completed. We discussed low calorie, low carb based diet program, portion control and increasing exercise.

## 2022-01-12 DIAGNOSIS — A59.9 TRICHOMONAL INFECTION: Primary | ICD-10-CM

## 2022-01-12 LAB
C TRACH RRNA SPEC QL NAA+PROBE: NEGATIVE
HIV 1+2 AB+HIV1 P24 AG SERPL QL IA: NON REACTIVE
N GONORRHOEA RRNA SPEC QL NAA+PROBE: NEGATIVE
RPR SER QL: NON REACTIVE
T VAGINALIS DNA SPEC QL NAA+PROBE: POSITIVE

## 2022-01-12 RX ORDER — METRONIDAZOLE 500 MG/1
500 TABLET ORAL 2 TIMES DAILY
Qty: 14 TABLET | Refills: 0 | Status: SHIPPED | OUTPATIENT
Start: 2022-01-12 | End: 2022-01-19

## 2022-01-12 NOTE — PROGRESS NOTES
Please call and notify  Mr. Rodrigez,     Recent lab work is normal except - positive for trichomonas.     I will send Flagyl to his pharmacy, [facility].    With this medication he can NOT drink any alcohol.       DARLEEN

## 2022-01-14 ENCOUNTER — PATIENT ROUNDING (BHMG ONLY) (OUTPATIENT)
Dept: INTERNAL MEDICINE | Facility: CLINIC | Age: 42
End: 2022-01-14

## 2022-01-14 NOTE — PROGRESS NOTES
January 14, 2022    Hello, may I speak with Richard Goyalton?    My name is Kya Richmond    I am  with [facility]  [address] [place] [id number] [phone].    Before we get started may I verify your date of birth? 1980    I am calling to officially welcome you to our practice and ask about your recent visit. Is this a good time to talk?  NO      Received vm .  Left a message welcoming patient to the practice and inviting him to return my call to speak about his recent visit and experience at our practice.           Thank you, and have a great day.

## 2022-02-07 ENCOUNTER — HOSPITAL ENCOUNTER (OUTPATIENT)
Dept: MRI IMAGING | Facility: HOSPITAL | Age: 42
Discharge: HOME | End: 2022-02-07
Admitting: NURSE PRACTITIONER

## 2022-02-07 DIAGNOSIS — R16.0 LIVER MASS: ICD-10-CM

## 2022-02-07 PROCEDURE — A9577 INJ MULTIHANCE: HCPCS | Performed by: NURSE PRACTITIONER

## 2022-02-07 PROCEDURE — 0 GADOBENATE DIMEGLUMINE 529 MG/ML SOLUTION: Performed by: NURSE PRACTITIONER

## 2022-02-07 PROCEDURE — 74183 MRI ABD W/O CNTR FLWD CNTR: CPT

## 2022-02-07 RX ADMIN — GADOBENATE DIMEGLUMINE 20 ML: 529 INJECTION, SOLUTION INTRAVENOUS at 15:51

## 2022-02-10 ENCOUNTER — TELEPHONE (OUTPATIENT)
Dept: INTERNAL MEDICINE | Facility: CLINIC | Age: 42
End: 2022-02-10

## 2022-02-10 DIAGNOSIS — D18.03 HEPATIC HEMANGIOMA: Primary | ICD-10-CM

## 2022-02-10 NOTE — TELEPHONE ENCOUNTER
Caller: Richard Rodrigez    Relationship: Self    Best call back number: [phone]    What is the best time to reach you: ANY TIME    Who are you requesting to speak with (clinical staff, provider,  specific staff member): NAFISA    What was the call regarding: PATIENT RETURNING NAFISA'S PHONE CALL FOR MRI RESULTS.    HUB ATTEMPTED TO WARM TRANSFER TO PRACTICE BUT WAS UNSUCCESSFUL    Do you require a callback: YES

## 2022-10-26 ENCOUNTER — OFFICE VISIT (OUTPATIENT)
Dept: INTERNAL MEDICINE | Facility: CLINIC | Age: 42
End: 2022-10-26

## 2022-10-26 VITALS
TEMPERATURE: 97.5 F | BODY MASS INDEX: 42.66 KG/M2 | SYSTOLIC BLOOD PRESSURE: 130 MMHG | OXYGEN SATURATION: 98 % | WEIGHT: 315 LBS | DIASTOLIC BLOOD PRESSURE: 78 MMHG | HEIGHT: 72 IN | HEART RATE: 95 BPM

## 2022-10-26 DIAGNOSIS — I10 ESSENTIAL HYPERTENSION: Chronic | ICD-10-CM

## 2022-10-26 DIAGNOSIS — F17.200 TOBACCO USE DISORDER: Chronic | ICD-10-CM

## 2022-10-26 DIAGNOSIS — Z13.6 ENCOUNTER FOR LIPID SCREENING FOR CARDIOVASCULAR DISEASE: ICD-10-CM

## 2022-10-26 DIAGNOSIS — Z13.220 ENCOUNTER FOR LIPID SCREENING FOR CARDIOVASCULAR DISEASE: ICD-10-CM

## 2022-10-26 DIAGNOSIS — Z00.00 ANNUAL PHYSICAL EXAM: Primary | ICD-10-CM

## 2022-10-26 DIAGNOSIS — E66.01 MORBID (SEVERE) OBESITY DUE TO EXCESS CALORIES: ICD-10-CM

## 2022-10-26 PROCEDURE — 99396 PREV VISIT EST AGE 40-64: CPT | Performed by: NURSE PRACTITIONER

## 2022-10-26 NOTE — PROGRESS NOTES
Chief Complaint  Annual Exam     Subjective:      History of Present Illness {CC  Problem List  Visit  Diagnosis   Encounters  Notes  Medications  Labs  Result Review Imaging  Media :23}     Richard Rodrigez presents to [facility] for:  Annual exam       States doing well.   No new complaints.   Works at [employer]: Yousif Ramos is here for coordination of medical care, to discuss health maintenance, disease prevention as well as to follow up on medical problems.     Patient Care Team:  Jessie Almodovar III, NP-C as PCP - General (Family Medicine)      He states that his activity level is minimal.   His diet is eating mostly cereal because he wanted to eat low sodium diet. .   Feels fairly well with few complaints.     Health and Weight:   Weight trend is worsening.     Wt Readings from Last 4 Encounters:   10/26/22 (!) 159 kg (351 lb 8 oz)   01/10/22 (!) 147 kg (324 lb)   12/23/21 (!) 152 kg (335 lb)   12/13/21 (!) 153 kg (337 lb)         Blood Pressure:   BP Readings from Last 3 Encounters:   10/26/22 130/78   01/10/22 118/74   12/23/21 118/76        Cholesterol Screen:   Most men start screen at 35, if you have family history or comorbidities it may be screen earlier.     Risk Evaluation:  1. Cardiovascular risk factors: hypertension, tobacco abuse, obesity, male gender.  2. Diabetes risk factors: obesity.   3. Cancer risk factors: tobacco smoking.    Prevention:   Cholesterol and glucose screen due.   Hepatitis  C screen: [] Due  [x] Completed :  Neg 12/12/2021    Colon Cancer Screen:   Colonoscopy due at 46 yoa unless clinically indicated before.   Family history: [x] None    [] Yes:     Genital/ Urinary Health:   · He voids without difficulty.  · He is sexually active.  2 partners   ·   STI Screen:   [] HIV     [] Syphilis   [] Chlamydia/ Gonorrhea   [x] Declines STD screen  If tests ordered, patient was informed HIV results will not  "show up on my chart.     Testicular cancer Screen:   Testicular self exams recommended once a month.   Advised that any firm testicular nodules to be reported immediately.    Prostate cancer screening:    Family history: [x] None    [] Yes:      Lung Cancer Screen:   [] Nonsmoker  [x] History of smoking  [x] still smokes cigar at work sometimes.       Vaccines Due:   [] Pneumovax    [] Prevnar 20  [] Shingrix (shingles: series of 2)   []  [] COVID (series of 2)    []  States will get flu vaccine at work.     States no alcohol: \"completely done\"     Last eye exam:  Advise routine   Always where sunglass when outside with UV protection.       Skin Cancer:   Regular Sunsceen: Advised  Routine self assessment of your skin, report any changes.       I have reviewed patient's medical history, any new submitted information provided by patient or medical assistant and updated medical record.      Objective:      Physical Exam  Vitals reviewed.   Constitutional:       Appearance: He is well-developed. He is obese.   HENT:      Head: Normocephalic and atraumatic.      Right Ear: External ear normal.      Left Ear: External ear normal.      Nose: Nose normal.   Eyes:      Conjunctiva/sclera: Conjunctivae normal.      Pupils: Pupils are equal, round, and reactive to light.   Neck:      Thyroid: No thyromegaly.      Vascular: No JVD.   Cardiovascular:      Rate and Rhythm: Normal rate and regular rhythm.      Pulses: Normal pulses.           Radial pulses are 2+ on the right side and 2+ on the left side.      Heart sounds: Normal heart sounds, S1 normal and S2 normal. No murmur heard.    No friction rub. No gallop.   Pulmonary:      Effort: Pulmonary effort is normal.      Breath sounds: Normal breath sounds.   Chest:      Chest wall: No deformity.   Abdominal:      General: Bowel sounds are normal.      Palpations: Abdomen is soft.      Tenderness: There is no abdominal tenderness. Negative signs include Patel's sign.      " "Hernia: No hernia is present.   Musculoskeletal:      Cervical back: Normal range of motion and neck supple.   Lymphadenopathy:      Cervical: No cervical adenopathy.   Skin:     General: Skin is warm and dry.      Capillary Refill: Capillary refill takes 2 to 3 seconds.      Nails: There is no clubbing.   Neurological:      General: No focal deficit present.      Mental Status: He is alert and oriented to person, place, and time.      Cranial Nerves: No cranial nerve deficit.      Sensory: No sensory deficit.      Motor: Motor function is intact.   Psychiatric:         Mood and Affect: Mood normal.         Speech: Speech normal.         Behavior: Behavior normal. Behavior is cooperative.         Thought Content: Thought content normal.         Judgment: Judgment normal.        Result Review  Data Reviewed:{ Labs  Result Review  Imaging  Med Tab  Media :23}     The following data was reviewed by: Jessie Almodovar III NP-C on 10/26/2022  Common labs    Common Labs 12/12/21 12/12/21 12/13/21 12/23/21    [id number]     Glucose  96 116 (A) 97   BUN  20 19 24 (A)   Creatinine  1.12 0.95 1.11   eGFR African Am  88 106 89   Sodium  135 (A) 134 (A) 139   Potassium  4.0 4.5 4.5   Chloride  88 (A) 93 (A) 100   Calcium  9.7 9.9 9.6   Albumin  3.10 (A) 3.60    Total Bilirubin  1.0 0.9    Alkaline Phosphatase  90 96    AST (SGOT)  45 (A) 58 (A)    ALT (SGPT)  33 44 (A)    WBC 5.32      Hemoglobin 16.0      Hematocrit 52.4 (A)      Platelets 313      (A) Abnormal value       Comments are available for some flowsheets but are not being displayed.                  Vital Signs:   /78 (BP Location: Left arm, Patient Position: Sitting, Cuff Size: Adult)   Pulse 95   Temp 97.5 °F (36.4 °C) (Temporal)   Ht 182.9 cm (72\")   Wt (!) 159 kg (351 lb 8 oz)   SpO2 98%   BMI 47.67 kg/m²         Requested Prescriptions      No prescriptions requested or ordered in this encounter       Routine medications provided by " this office will also be refilled via pharmacy request.       Current Outpatient Medications:   •  furosemide (LASIX) 40 MG tablet, Take 1 tablet by mouth 2 (Two) Times a Day., Disp: 180 tablet, Rfl: 3  •  lisinopril (PRINIVIL,ZESTRIL) 40 MG tablet, Take 1 tablet by mouth Daily., Disp: 90 tablet, Rfl: 3  •  metoprolol tartrate (LOPRESSOR) 50 MG tablet, Take 1 tablet by mouth 2 (Two) Times a Day., Disp: 180 tablet, Rfl: 3     Assessment and Plan:      Assessment and Plan {CC Problem List  Visit Diagnosis  ROS  Review (Popup)  [facility] Maintenance  Quality  BestPractice  Medications  SmartSets  SnapShot Encounters  Media :23}     Problem List Items Addressed This Visit        Cardiac and Vasculature    Essential hypertension (Chronic)    Overview     Current treatment:  Lasix 40 mg, lisinopril 40 mg, lopressor 50 mg bid.          Current Assessment & Plan     Hypertension is improving with treatment.  Continue current treatment regimen.  Dietary sodium restriction.  Weight loss.  Stop smoking.  Continue current medications.  Blood pressure will be reassessed at the next regular appointment.         Relevant Orders    Comprehensive Metabolic Panel    CBC (No Diff)       Endocrine and Metabolic    Morbid (severe) obesity due to excess calories (HCC) (Chronic)    Current Assessment & Plan     Patient's (Body mass index is 47.67 kg/m².) indicates that they are morbidly obese (BMI > 40 or > 35 with obesity - related health condition) with health conditions that include obstructive sleep apnea and hypertension . Weight is worsening. BMI is is above average; BMI management plan is completed. We discussed low calorie, low carb based diet program, portion control, increasing exercise and joining a fitness center or start home based exercise program.     Needs to stop eating cereal due to sugar content.   Discussed diet changes.             Tobacco    Tobacco use disorder (Chronic)   Other Visit Diagnoses     Annual  physical exam    -  Primary    Relevant Orders    Comprehensive Metabolic Panel    CBC (No Diff)    Encounter for lipid screening for cardiovascular disease        Relevant Orders    Lipid Panel With LDL / HDL Ratio        Needs to reschedule with cardiology from cancelled appointment and reschedule sleep apnea test.   States he will.     Discussed he needs to lose weight: goal is 30 lbs by next visit.   Increase exercise.     Follow Up {Instructions Charge Capture  Follow-up Communications :23}     Return in about 6 months (around 4/26/2023).      Patient was given instructions and counseling regarding his condition or for health maintenance advice. Please see specific information pulled into the AVS if appropriate.    Dragon disclaimer:   Much of this encounter note is an electronic transcription/translation of spoken language to printed text. The electronic translation of spoken language may permit erroneous, or at times, nonsensical words or phrases to be inadvertently transcribed; Although I have reviewed the note for such errors, some may still exist.     Additional Patient Counseling:       Patient Instructions       October is Breast Cancer Awareness Month  Each year more than 245,000 women get breast cancer in the [place].  Each year approximately 2,650 men are diagnosed with breast cancer.     Monitor your breast for changes  • Any change in the size or the shape of the breast  • Pain in any area of the breast  • Nipple discharge other than breast milk (including blood)  • A new lump in the breast or underarm  *Continue regular scheduled mammograms    Diet:    • Eat vegetables, fruits, whole grain, low-fat dairy, poultry, fish, beans, nontropical vegetable oils, and nuts, but low amounts of red meat (i.e., Mediterranean-style diet, DASH [Dietary Approaches to Stop Hypertension] diet).  • Limit sugary drinks and sweets.  • Limit saturated and trans fat to 5% to 6% of calories.  • Limit sodium  intake to 2,400 mg daily (about one teaspoon table salt [kosher/sea salt have less sodium per teaspoon]).  • Fad diets will come and go.  Studies show that the most effective diet is one that you can continue long term.     Weight loss / Calorie Counting Apps:    • Lose It!   • MyFitness Pal   • Works great when you try it with a partner/ friend    Exercise:   • Engage in moderate-to-vigorous aerobic activity for at least 40 minutes (on average) three to four times each week.    Wearables:   • Activity tracker   • Step tracker: getting 7,500 steps daily can cut your cardiac risks by 44%     Bone Health:   • Https://www.nof.org/patients/treatment/nutrition/  • Routine weight bearing exercise    Vaccines:   • Flu vaccine every fall  • https://www.vaccinateyourfamily.org/        COVID booster recommended.   COVID resources: https://govstatus.[employer]/gpyvffp41

## 2022-10-26 NOTE — PATIENT INSTRUCTIONS
October is Breast Cancer Awareness Month  Each year more than 245,000 women get breast cancer in the [place].  Each year approximately 2,650 men are diagnosed with breast cancer.     Monitor your breast for changes  Any change in the size or the shape of the breast  Pain in any area of the breast  Nipple discharge other than breast milk (including blood)  A new lump in the breast or underarm  *Continue regular scheduled mammograms    Diet:    Eat vegetables, fruits, whole grain, low-fat dairy, poultry, fish, beans, nontropical vegetable oils, and nuts, but low amounts of red meat (i.e., Mediterranean-style diet, DASH [Dietary Approaches to Stop Hypertension] diet).  Limit sugary drinks and sweets.  Limit saturated and trans fat to 5% to 6% of calories.  Limit sodium intake to 2,400 mg daily (about one teaspoon table salt [kosher/sea salt have less sodium per teaspoon]).  Fad diets will come and go.  Studies show that the most effective diet is one that you can continue long term.     Weight loss / Calorie Counting Apps:    Lose It!   [employer] Pal   Works great when you try it with a partner/ friend    Exercise:   Engage in moderate-to-vigorous aerobic activity for at least 40 minutes (on average) three to four times each week.    Wearables:   Activity tracker   Step tracker: getting 7,500 steps daily can cut your cardiac risks by 44%     Bone Health:   Https://www.nof.org/patients/treatment/nutrition/  Routine weight bearing exercise    Vaccines:   Flu vaccine every fall  https://www.vaccinateyourfamily.org/        COVID booster recommended.   COVID resources: https://govstatus.[employer]/wgxyiws20

## 2022-10-26 NOTE — ASSESSMENT & PLAN NOTE
Hypertension is improving with treatment.  Continue current treatment regimen.  Dietary sodium restriction.  Weight loss.  Stop smoking.  Continue current medications.  Blood pressure will be reassessed at the next regular appointment.

## 2022-10-26 NOTE — ASSESSMENT & PLAN NOTE
Patient's (Body mass index is 47.67 kg/m².) indicates that they are morbidly obese (BMI > 40 or > 35 with obesity - related health condition) with health conditions that include obstructive sleep apnea and hypertension . Weight is worsening. BMI is is above average; BMI management plan is completed. We discussed low calorie, low carb based diet program, portion control, increasing exercise and joining a fitness center or start home based exercise program.     Needs to stop eating cereal due to sugar content.   Discussed diet changes.

## 2022-12-13 ENCOUNTER — TELEPHONE (OUTPATIENT)
Dept: INTERNAL MEDICINE | Facility: CLINIC | Age: 42
End: 2022-12-13

## 2023-01-04 ENCOUNTER — TELEPHONE (OUTPATIENT)
Dept: INTERNAL MEDICINE | Facility: CLINIC | Age: 43
End: 2023-01-04
Payer: COMMERCIAL

## 2023-01-04 RX ORDER — FUROSEMIDE 40 MG/1
TABLET ORAL
Qty: 180 TABLET | Refills: 0 | Status: SHIPPED | OUTPATIENT
Start: 2023-01-04 | End: 2023-04-06 | Stop reason: SDUPTHER

## 2023-01-04 RX ORDER — METOPROLOL TARTRATE 50 MG/1
TABLET, FILM COATED ORAL
Qty: 180 TABLET | Refills: 0 | Status: SHIPPED | OUTPATIENT
Start: 2023-01-04

## 2023-01-04 NOTE — TELEPHONE ENCOUNTER
Called pt and LMOM to schedule fasting labs. Hub please inform patient   Please follow up with Dr. Carl for followup appointment for bladder issues.     Please follow up with your PCP for further workup.

## 2023-01-19 ENCOUNTER — TELEPHONE (OUTPATIENT)
Dept: INTERNAL MEDICINE | Facility: CLINIC | Age: 43
End: 2023-01-19
Payer: COMMERCIAL

## 2023-01-27 RX ORDER — LISINOPRIL 40 MG/1
40 TABLET ORAL DAILY
Qty: 90 TABLET | Refills: 0 | Status: SHIPPED | OUTPATIENT
Start: 2023-01-27

## 2023-04-06 ENCOUNTER — TELEPHONE (OUTPATIENT)
Dept: CARDIOLOGY | Facility: CLINIC | Age: 43
End: 2023-04-06
Payer: COMMERCIAL

## 2023-04-06 RX ORDER — FUROSEMIDE 40 MG/1
TABLET ORAL
Qty: 180 TABLET | Refills: 0 | OUTPATIENT
Start: 2023-04-06

## 2023-04-06 RX ORDER — FUROSEMIDE 40 MG/1
40 TABLET ORAL 2 TIMES DAILY
Qty: 60 TABLET | Refills: 0 | Status: SHIPPED | OUTPATIENT
Start: 2023-04-06 | End: 2023-04-13 | Stop reason: SDUPTHER

## 2023-04-06 NOTE — TELEPHONE ENCOUNTER
Called and LVM for pt to schedule appt for refills. Pt also had letter mailed to him in Feb.   Please advise on refills. // HG

## 2023-04-06 NOTE — TELEPHONE ENCOUNTER
DELETE AFTER REVIEWING: Send the enc    Caller: EliaRichard    Relationship: Self    Best call back number: [phone]    Requested Prescriptions:   Requested Prescriptions     Pending Prescriptions Disp Refills   • furosemide (LASIX) 40 MG tablet 180 tablet 0     Sig: Take 1 tablet by mouth 2 (Two) Times a Day.        Pharmacy where request should be sent: [facility]/PHARMACY #[id number] [place], [place] - [address] [place] [address] [phone] [facility] [phone]      Last office visit with prescribing clinician: Visit date not found   Last telemedicine visit with prescribing clinician: 4/13/2023   Next office visit with prescribing clinician:     Additional details provided by patient: MADE MADE APPT WITH YESI OSPINA 04/13/2023    Does the patient have less than a 3 day supply:  [x] Yes  [] No    Would you like a call back once the refill request has been completed: [] Yes [x] No    If the office needs to give you a call back, can they leave a voicemail: [x] Yes [] No    Marcso Moore   04/06/23 12:47 EDT

## 2023-04-10 RX ORDER — FUROSEMIDE 40 MG/1
40 TABLET ORAL 2 TIMES DAILY
Qty: 60 TABLET | Refills: 0 | OUTPATIENT
Start: 2023-04-10

## 2023-04-10 RX ORDER — LISINOPRIL 40 MG/1
TABLET ORAL
Qty: 90 TABLET | Refills: 0 | OUTPATIENT
Start: 2023-04-10

## 2023-04-10 NOTE — TELEPHONE ENCOUNTER
Rx Refill Note  Requested Prescriptions     Pending Prescriptions Disp Refills   • lisinopril (PRINIVIL,ZESTRIL) 40 MG tablet [Pharmacy Med Name: LISINOPRIL 40 MG TABLET] 90 tablet 0     Sig: TAKE 1 TABLET BY MOUTH EVERY DAY      Last office visit with prescribing clinician: Visit date not found   Last telemedicine visit with prescribing clinician: 4/10/2023   Next office visit with prescribing clinician: Visit date not found     Marcello Gaxiola MA  04/10/23, 13:11 EDT

## 2023-04-10 NOTE — TELEPHONE ENCOUNTER
Caller: Richard Rodrigez Skye    Relationship: Self    Best call back number:[phone]    Requested Prescriptions:   Requested Prescriptions     Pending Prescriptions Disp Refills   • furosemide (LASIX) 40 MG tablet 60 tablet 0     Sig: Take 1 tablet by mouth 2 (Two) Times a Day.        Pharmacy where request should be sent: [facility]/PHARMACY #[id number] - [place], [place] - [address] [facility] [phone] [facility] [phone]      Last office visit with prescribing clinician: 10/26/2022   Last telemedicine visit with prescribing clinician: Visit date not found   Next office visit with prescribing clinician: Visit date not found     Additional details provided by patient: PATIENT IS OUT OF MEDICATION.     Does the patient have less than a 3 day supply:  [x] Yes  [] No    Would you like a call back once the refill request has been completed: [x] Yes [] No    If the office needs to give you a call back, can they leave a voicemail: [x] Yes [] No    Marcos Grace Rep   04/10/23 09:05 EDT

## 2023-04-10 NOTE — TELEPHONE ENCOUNTER
Needs appt before further refills.   Last office note: Return in about 6 months (around 4/26/2023).    Can refill after appt made.

## 2023-04-13 ENCOUNTER — OFFICE VISIT (OUTPATIENT)
Dept: CARDIOLOGY | Facility: CLINIC | Age: 43
End: 2023-04-13
Payer: COMMERCIAL

## 2023-04-13 VITALS
OXYGEN SATURATION: 90 % | SYSTOLIC BLOOD PRESSURE: 126 MMHG | HEIGHT: 72 IN | BODY MASS INDEX: 42.66 KG/M2 | HEART RATE: 82 BPM | WEIGHT: 315 LBS | DIASTOLIC BLOOD PRESSURE: 84 MMHG

## 2023-04-13 DIAGNOSIS — I10 PRIMARY HYPERTENSION: Primary | ICD-10-CM

## 2023-04-13 PROCEDURE — 99213 OFFICE O/P EST LOW 20 MIN: CPT | Performed by: NURSE PRACTITIONER

## 2023-04-13 PROCEDURE — 93000 ELECTROCARDIOGRAM COMPLETE: CPT | Performed by: NURSE PRACTITIONER

## 2023-04-13 RX ORDER — FUROSEMIDE 40 MG/1
40 TABLET ORAL 2 TIMES DAILY
Qty: 180 TABLET | Refills: 3 | Status: SHIPPED | OUTPATIENT
Start: 2023-04-13

## 2023-04-13 RX ORDER — LISINOPRIL 40 MG/1
40 TABLET ORAL DAILY
Qty: 90 TABLET | Refills: 3 | Status: SHIPPED | OUTPATIENT
Start: 2023-04-13

## 2023-04-13 RX ORDER — METOPROLOL TARTRATE 50 MG/1
50 TABLET, FILM COATED ORAL 2 TIMES DAILY
Qty: 180 TABLET | Refills: 3 | Status: SHIPPED | OUTPATIENT
Start: 2023-04-13

## 2023-04-13 NOTE — PROGRESS NOTES
"    CARDIOLOGY        Patient Name: Richard Rodrigez  :1980  Age: 43 y.o.  Primary Cardiologist: Dennys Moreno MD  Encounter Provider:  QUIQUE Pisano    Date of Service: 23      CHIEF COMPLAINT / REASON FOR OFFICE VISIT     Hypertension      HISTORY OF PRESENT ILLNESS       HPI  Richard Rodrigez is a 43 y.o. male who presents today for overdue routine evaluation.  Last office visit .    Pt has a  history significant for hypertension, tobacco abuse, alcohol abuse.    Patient reports that he has done well over the past year.  He does notes that he is out of medications and is in need of refills.  Blood pressure has been controlled.  Patient is currently asymptomatic and denies chest pain, dyspnea, dyspnea with exertion, palpitations, lightheadedness, edema, fatigue.  He does continue to smoke but hopes to quit within the year.        The following portions of the patient's history were reviewed and updated as appropriate: allergies, current medications, past family history, past medical history, past social history, past surgical history and problem list.      VITAL SIGNS     Visit Vitals  /84 (BP Location: Left arm, Patient Position: Sitting, Cuff Size: Large Adult)   Pulse 82   Ht 182.9 cm (72\")   Wt (!) 165 kg (364 lb 9.6 oz)   SpO2 90%   BMI 49.45 kg/m²         Wt Readings from Last 3 Encounters:   23 (!) 165 kg (364 lb 9.6 oz)   10/26/22 (!) 159 kg (351 lb 8 oz)   01/10/22 (!) 147 kg (324 lb)     Body mass index is 49.45 kg/m².      REVIEW OF SYSTEMS   Review of Systems   Constitutional: Negative for chills, fever, weight gain and weight loss.   Cardiovascular: Negative for leg swelling.   Respiratory: Negative for cough, snoring and wheezing.    Hematologic/Lymphatic: Negative for bleeding problem. Does not bruise/bleed easily.   Skin: Negative for color change.   Musculoskeletal: Negative for falls, joint pain and myalgias.   Gastrointestinal: Negative for " melena.   Genitourinary: Negative for hematuria.   Neurological: Negative for excessive daytime sleepiness.   Psychiatric/Behavioral: Negative for depression. The patient is not nervous/anxious.            PHYSICAL EXAMINATION     Constitutional:       Appearance: Normal appearance. Well-developed. Morbidly obese.   Eyes:      Conjunctiva/sclera: Conjunctivae normal.   Neck:      Vascular: No carotid bruit.   Pulmonary:      Effort: Pulmonary effort is normal.      Breath sounds: Examination of the right-lower field reveals wheezing. Examination of the left-lower field reveals wheezing. Wheezing present.   Cardiovascular:      Normal rate. Regular rhythm. Normal S1. Normal S2.      Murmurs: There is no murmur.      No gallop. No click. No rub.   Edema:     Peripheral edema absent.   Musculoskeletal: Normal range of motion. Skin:     General: Skin is warm and dry.   Neurological:      Mental Status: Alert and oriented to person, place, and time.      GCS: GCS eye subscore is 4. GCS verbal subscore is 5. GCS motor subscore is 6.   Psychiatric:         Speech: Speech normal.         Behavior: Behavior normal.         Thought Content: Thought content normal.         Judgment: Judgment normal.           REVIEWED DATA       ECG 12 Lead    Date/Time: 4/13/2023 10:59 AM  Performed by: Cyndie Diaz APRN  Authorized by: Cyndie Diaz APRN   Comparison: compared with previous ECG from 12/7/2021  Rhythm: sinus rhythm  Rate: normal  BPM: 82  Conduction: conduction normal  ST Segments: ST segments normal  T Waves: T waves normal  QRS axis: right  Other findings: poor R wave progression    Clinical impression: non-specific ECG            Cardiac Procedures:  1. Echocardiogram 12/9/2021.  LVEF 57%.  Mild LVH.  Diastolic function indeterminate.  Mildly reduced RV systolic function.        BUN   Date Value Ref Range Status   12/23/2021 24 (H) 6 - 20 mg/dL Final     Creatinine   Date Value Ref Range Status   12/23/2021 1.11  0.76 - 1.27 mg/dL Final     Potassium   Date Value Ref Range Status   12/23/2021 4.5 3.5 - 5.2 mmol/L Final     ALT (SGPT)   Date Value Ref Range Status   12/13/2021 44 (H) 1 - 41 U/L Final     AST (SGOT)   Date Value Ref Range Status   12/13/2021 58 (H) 1 - 40 U/L Final           ASSESSMENT & PLAN     Diagnoses and all orders for this visit:    1. Primary hypertension (Primary)  · Patient has been asymptomatic for the past year  · BP controlled at 126/84  · Refills provided for furosemide 40 mg twice daily, lisinopril 40 mg/day, metoprolol tartrate 50 mg twice daily  -     ECG 12 Lead    Other orders  -     lisinopril (PRINIVIL,ZESTRIL) 40 MG tablet; Take 1 tablet by mouth Daily.  Dispense: 90 tablet; Refill: 3  -     metoprolol tartrate (LOPRESSOR) 50 MG tablet; Take 1 tablet by mouth 2 (Two) Times a Day.  Dispense: 180 tablet; Refill: 3  -     furosemide (LASIX) 40 MG tablet; Take 1 tablet by mouth 2 (Two) Times a Day.  Dispense: 180 tablet; Refill: 3        No follow-ups on file.    Future Appointments       Provider Department Center    4/15/2024 2:20 PM Dennys Moreno MD [facility] CARDIOLOGY [place]            MEDICATIONS         Discharge Medications          Accurate as of April 13, 2023 11:23 AM. If you have any questions, ask your nurse or doctor.            Continue These Medications      Instructions Start Date   furosemide 40 MG tablet  Commonly known as: LASIX   40 mg, Oral, 2 Times Daily      lisinopril 40 MG tablet  Commonly known as: PRINIVIL,ZESTRIL   40 mg, Oral, Daily      metoprolol tartrate 50 MG tablet  Commonly known as: LOPRESSOR   50 mg, Oral, 2 Times Daily                 **Dragon Disclaimer:   Much of this encounter note is an electronic transcription/translation of spoken language to printed text. The electronic translation of spoken language may permit erroneous, or at times, nonsensical words or phrases to be inadvertently transcribed. Although I have reviewed the  note for such errors, some may still exist.

## 2024-02-10 ENCOUNTER — HOSPITAL ENCOUNTER (INPATIENT)
Facility: HOSPITAL | Age: 44
LOS: 4 days | Discharge: HOME | End: 2024-02-14
Attending: EMERGENCY MEDICINE | Admitting: INTERNAL MEDICINE
Payer: COMMERCIAL

## 2024-02-10 ENCOUNTER — APPOINTMENT (OUTPATIENT)
Dept: GENERAL RADIOLOGY | Facility: HOSPITAL | Age: 44
End: 2024-02-10
Payer: COMMERCIAL

## 2024-02-10 DIAGNOSIS — N17.0 ACUTE KIDNEY INJURY (AKI) WITH ACUTE TUBULAR NECROSIS (ATN): ICD-10-CM

## 2024-02-10 DIAGNOSIS — L03.115 CELLULITIS OF RIGHT ANKLE: ICD-10-CM

## 2024-02-10 DIAGNOSIS — R60.1 ANASARCA: Primary | ICD-10-CM

## 2024-02-10 DIAGNOSIS — T14.8XXA OPEN WOUND: ICD-10-CM

## 2024-02-10 DIAGNOSIS — E80.6 HYPERBILIRUBINEMIA: ICD-10-CM

## 2024-02-10 DIAGNOSIS — A41.9 SEPSIS, DUE TO UNSPECIFIED ORGANISM, UNSPECIFIED WHETHER ACUTE ORGAN DYSFUNCTION PRESENT: ICD-10-CM

## 2024-02-10 DIAGNOSIS — R79.89 ELEVATED TROPONIN: ICD-10-CM

## 2024-02-10 PROBLEM — S90.511A: Status: ACTIVE | Noted: 2024-02-10

## 2024-02-10 PROBLEM — R73.03 PREDIABETES: Status: ACTIVE | Noted: 2024-02-10

## 2024-02-10 PROBLEM — H10.9 BACTERIAL CONJUNCTIVITIS: Status: ACTIVE | Noted: 2024-02-10

## 2024-02-10 PROBLEM — L08.9 WOUND INFECTION: Status: ACTIVE | Noted: 2024-02-10

## 2024-02-10 PROBLEM — L08.9: Status: ACTIVE | Noted: 2024-02-10

## 2024-02-10 PROBLEM — I50.31 DIASTOLIC CHF, ACUTE: Status: ACTIVE | Noted: 2024-02-10

## 2024-02-10 LAB
ALBUMIN SERPL-MCNC: 3 G/DL (ref 3.5–5.2)
ALBUMIN SERPL-MCNC: 3.1 G/DL (ref 3.5–5.2)
ALBUMIN/GLOB SERPL: 0.6 G/DL
ALP SERPL-CCNC: 142 U/L (ref 39–117)
ALP SERPL-CCNC: 145 U/L (ref 39–117)
ALT SERPL W P-5'-P-CCNC: 35 U/L (ref 1–41)
ALT SERPL W P-5'-P-CCNC: 35 U/L (ref 1–41)
ANION GAP SERPL CALCULATED.3IONS-SCNC: 10.5 MMOL/L (ref 5–15)
ANION GAP SERPL CALCULATED.3IONS-SCNC: 9.2 MMOL/L (ref 5–15)
ANISOCYTOSIS BLD QL: ABNORMAL
AST SERPL-CCNC: 68 U/L (ref 1–40)
AST SERPL-CCNC: 68 U/L (ref 1–40)
BILIRUB CONJ SERPL-MCNC: 3.9 MG/DL (ref 0–0.3)
BILIRUB CONJ SERPL-MCNC: 3.9 MG/DL (ref 0–0.3)
BILIRUB INDIRECT SERPL-MCNC: 1.6 MG/DL
BILIRUB SERPL-MCNC: 5.4 MG/DL (ref 0–1.2)
BILIRUB SERPL-MCNC: 5.5 MG/DL (ref 0–1.2)
BUN SERPL-MCNC: 39 MG/DL (ref 6–20)
BUN SERPL-MCNC: 40 MG/DL (ref 6–20)
BUN/CREAT SERPL: 31.3 (ref 7–25)
BUN/CREAT SERPL: 32 (ref 7–25)
CALCIUM SPEC-SCNC: 9 MG/DL (ref 8.6–10.5)
CALCIUM SPEC-SCNC: 9.3 MG/DL (ref 8.6–10.5)
CHLORIDE SERPL-SCNC: 96 MMOL/L (ref 98–107)
CHLORIDE SERPL-SCNC: 98 MMOL/L (ref 98–107)
CO2 SERPL-SCNC: 30.5 MMOL/L (ref 22–29)
CO2 SERPL-SCNC: 32.8 MMOL/L (ref 22–29)
CREAT SERPL-MCNC: 1.22 MG/DL (ref 0.76–1.27)
CREAT SERPL-MCNC: 1.28 MG/DL (ref 0.76–1.27)
CRP SERPL-MCNC: 2.52 MG/DL (ref 0–0.5)
D-LACTATE SERPL-SCNC: 1.7 MMOL/L (ref 0.5–2)
D-LACTATE SERPL-SCNC: 2.2 MMOL/L (ref 0.5–2)
DEPRECATED RDW RBC AUTO: 56.7 FL (ref 37–54)
DEPRECATED RDW RBC AUTO: 57.9 FL (ref 37–54)
EGFRCR SERPLBLD CKD-EPI 2021: 71.2 ML/MIN/1.73
EGFRCR SERPLBLD CKD-EPI 2021: 75.4 ML/MIN/1.73
ERYTHROCYTE [DISTWIDTH] IN BLOOD BY AUTOMATED COUNT: 19.8 % (ref 12.3–15.4)
ERYTHROCYTE [DISTWIDTH] IN BLOOD BY AUTOMATED COUNT: 20.2 % (ref 12.3–15.4)
ERYTHROCYTE [SEDIMENTATION RATE] IN BLOOD: 19 MM/HR (ref 0–15)
GEN 5 2HR TROPONIN T REFLEX: 76 NG/L
GLOBULIN UR ELPH-MCNC: 5 GM/DL
GLUCOSE SERPL-MCNC: 65 MG/DL (ref 65–99)
GLUCOSE SERPL-MCNC: 76 MG/DL (ref 65–99)
HBA1C MFR BLD: 6.3 % (ref 4.8–5.6)
HCT VFR BLD AUTO: 55.4 % (ref 37.5–51)
HCT VFR BLD AUTO: 56.6 % (ref 37.5–51)
HGB BLD-MCNC: 16.5 G/DL (ref 13–17.7)
HGB BLD-MCNC: 16.8 G/DL (ref 13–17.7)
LYMPHOCYTES # BLD MANUAL: 1.19 10*3/MM3 (ref 0.7–3.1)
LYMPHOCYTES NFR BLD MANUAL: 4.6 % (ref 5–12)
MACROCYTES BLD QL SMEAR: ABNORMAL
MAGNESIUM SERPL-MCNC: 2.2 MG/DL (ref 1.6–2.6)
MCH RBC QN AUTO: 25.8 PG (ref 26.6–33)
MCH RBC QN AUTO: 25.9 PG (ref 26.6–33)
MCHC RBC AUTO-ENTMCNC: 29.7 G/DL (ref 31.5–35.7)
MCHC RBC AUTO-ENTMCNC: 29.8 G/DL (ref 31.5–35.7)
MCV RBC AUTO: 87.1 FL (ref 79–97)
MCV RBC AUTO: 87.1 FL (ref 79–97)
MONOCYTES # BLD: 0.28 10*3/MM3 (ref 0.1–0.9)
NEUTROPHILS # BLD AUTO: 4.61 10*3/MM3 (ref 1.7–7)
NEUTROPHILS NFR BLD MANUAL: 75.9 % (ref 42.7–76)
NT-PROBNP SERPL-MCNC: 3398 PG/ML (ref 0–450)
PLAT MORPH BLD: NORMAL
PLATELET # BLD AUTO: 194 10*3/MM3 (ref 140–450)
PLATELET # BLD AUTO: 202 10*3/MM3 (ref 140–450)
PMV BLD AUTO: 10.2 FL (ref 6–12)
PMV BLD AUTO: 10.5 FL (ref 6–12)
POIKILOCYTOSIS BLD QL SMEAR: ABNORMAL
POLYCHROMASIA BLD QL SMEAR: ABNORMAL
POTASSIUM SERPL-SCNC: 5 MMOL/L (ref 3.5–5.2)
POTASSIUM SERPL-SCNC: 5 MMOL/L (ref 3.5–5.2)
PROCALCITONIN SERPL-MCNC: 0.37 NG/ML (ref 0–0.25)
PROT SERPL-MCNC: 8 G/DL (ref 6–8.5)
PROT SERPL-MCNC: 8.3 G/DL (ref 6–8.5)
RBC # BLD AUTO: 6.36 10*6/MM3 (ref 4.14–5.8)
RBC # BLD AUTO: 6.5 10*6/MM3 (ref 4.14–5.8)
SMUDGE CELLS BLD QL SMEAR: ABNORMAL
SODIUM SERPL-SCNC: 138 MMOL/L (ref 136–145)
SODIUM SERPL-SCNC: 139 MMOL/L (ref 136–145)
T4 FREE SERPL-MCNC: 1.03 NG/DL (ref 0.93–1.7)
TARGETS BLD QL SMEAR: ABNORMAL
TROPONIN T DELTA: -12 NG/L
TROPONIN T SERPL HS-MCNC: 88 NG/L
TSH SERPL DL<=0.05 MIU/L-ACNC: 4.04 UIU/ML (ref 0.27–4.2)
VARIANT LYMPHS NFR BLD MANUAL: 19.5 % (ref 19.6–45.3)
WBC NRBC COR # BLD AUTO: 6.08 10*3/MM3 (ref 3.4–10.8)
WBC NRBC COR # BLD AUTO: 6.5 10*3/MM3 (ref 3.4–10.8)

## 2024-02-10 PROCEDURE — 84484 ASSAY OF TROPONIN QUANT: CPT | Performed by: EMERGENCY MEDICINE

## 2024-02-10 PROCEDURE — 85027 COMPLETE CBC AUTOMATED: CPT | Performed by: NURSE PRACTITIONER

## 2024-02-10 PROCEDURE — 25010000002 FUROSEMIDE PER 20 MG: Performed by: EMERGENCY MEDICINE

## 2024-02-10 PROCEDURE — 25010000002 VANCOMYCIN PER 500 MG: Performed by: NURSE PRACTITIONER

## 2024-02-10 PROCEDURE — 25810000003 SODIUM CHLORIDE 0.9 % SOLUTION: Performed by: EMERGENCY MEDICINE

## 2024-02-10 PROCEDURE — 83605 ASSAY OF LACTIC ACID: CPT | Performed by: EMERGENCY MEDICINE

## 2024-02-10 PROCEDURE — 25010000002 MORPHINE PER 10 MG: Performed by: EMERGENCY MEDICINE

## 2024-02-10 PROCEDURE — 87040 BLOOD CULTURE FOR BACTERIA: CPT | Performed by: EMERGENCY MEDICINE

## 2024-02-10 PROCEDURE — 84439 ASSAY OF FREE THYROXINE: CPT | Performed by: EMERGENCY MEDICINE

## 2024-02-10 PROCEDURE — 84145 PROCALCITONIN (PCT): CPT | Performed by: EMERGENCY MEDICINE

## 2024-02-10 PROCEDURE — 71045 X-RAY EXAM CHEST 1 VIEW: CPT

## 2024-02-10 PROCEDURE — 93010 ELECTROCARDIOGRAM REPORT: CPT | Performed by: INTERNAL MEDICINE

## 2024-02-10 PROCEDURE — 87070 CULTURE OTHR SPECIMN AEROBIC: CPT | Performed by: EMERGENCY MEDICINE

## 2024-02-10 PROCEDURE — 25010000002 FUROSEMIDE PER 20 MG: Performed by: INTERNAL MEDICINE

## 2024-02-10 PROCEDURE — 99291 CRITICAL CARE FIRST HOUR: CPT

## 2024-02-10 PROCEDURE — 85025 COMPLETE CBC W/AUTO DIFF WBC: CPT | Performed by: EMERGENCY MEDICINE

## 2024-02-10 PROCEDURE — 86140 C-REACTIVE PROTEIN: CPT | Performed by: EMERGENCY MEDICINE

## 2024-02-10 PROCEDURE — 93005 ELECTROCARDIOGRAM TRACING: CPT | Performed by: EMERGENCY MEDICINE

## 2024-02-10 PROCEDURE — 83735 ASSAY OF MAGNESIUM: CPT | Performed by: EMERGENCY MEDICINE

## 2024-02-10 PROCEDURE — 36415 COLL VENOUS BLD VENIPUNCTURE: CPT | Performed by: EMERGENCY MEDICINE

## 2024-02-10 PROCEDURE — 85652 RBC SED RATE AUTOMATED: CPT | Performed by: EMERGENCY MEDICINE

## 2024-02-10 PROCEDURE — 83036 HEMOGLOBIN GLYCOSYLATED A1C: CPT | Performed by: INTERNAL MEDICINE

## 2024-02-10 PROCEDURE — 87077 CULTURE AEROBIC IDENTIFY: CPT | Performed by: EMERGENCY MEDICINE

## 2024-02-10 PROCEDURE — 83880 ASSAY OF NATRIURETIC PEPTIDE: CPT | Performed by: EMERGENCY MEDICINE

## 2024-02-10 PROCEDURE — 87186 SC STD MICRODIL/AGAR DIL: CPT | Performed by: EMERGENCY MEDICINE

## 2024-02-10 PROCEDURE — 85007 BL SMEAR W/DIFF WBC COUNT: CPT | Performed by: EMERGENCY MEDICINE

## 2024-02-10 PROCEDURE — 82248 BILIRUBIN DIRECT: CPT | Performed by: EMERGENCY MEDICINE

## 2024-02-10 PROCEDURE — 73610 X-RAY EXAM OF ANKLE: CPT

## 2024-02-10 PROCEDURE — 87205 SMEAR GRAM STAIN: CPT | Performed by: EMERGENCY MEDICINE

## 2024-02-10 PROCEDURE — 80048 BASIC METABOLIC PNL TOTAL CA: CPT | Performed by: NURSE PRACTITIONER

## 2024-02-10 PROCEDURE — 80053 COMPREHEN METABOLIC PANEL: CPT | Performed by: EMERGENCY MEDICINE

## 2024-02-10 PROCEDURE — 25010000002 CEFTRIAXONE PER 250 MG: Performed by: EMERGENCY MEDICINE

## 2024-02-10 PROCEDURE — 25010000002 VANCOMYCIN 10 G RECONSTITUTED SOLUTION: Performed by: EMERGENCY MEDICINE

## 2024-02-10 PROCEDURE — 84443 ASSAY THYROID STIM HORMONE: CPT | Performed by: EMERGENCY MEDICINE

## 2024-02-10 RX ORDER — METOPROLOL TARTRATE 50 MG/1
50 TABLET, FILM COATED ORAL 2 TIMES DAILY
Status: DISCONTINUED | OUTPATIENT
Start: 2024-02-10 | End: 2024-02-14

## 2024-02-10 RX ORDER — ASPIRIN 81 MG/1
324 TABLET, CHEWABLE ORAL ONCE
Status: COMPLETED | OUTPATIENT
Start: 2024-02-10 | End: 2024-02-10

## 2024-02-10 RX ORDER — SODIUM HYPOCHLORITE 1.25 MG/ML
SOLUTION TOPICAL EVERY 12 HOURS
Status: DISCONTINUED | OUTPATIENT
Start: 2024-02-10 | End: 2024-02-12

## 2024-02-10 RX ORDER — MORPHINE SULFATE 2 MG/ML
4 INJECTION, SOLUTION INTRAMUSCULAR; INTRAVENOUS ONCE
Status: COMPLETED | OUTPATIENT
Start: 2024-02-10 | End: 2024-02-10

## 2024-02-10 RX ORDER — CALCIUM CARBONATE 500 MG/1
2 TABLET, CHEWABLE ORAL 2 TIMES DAILY PRN
Status: DISCONTINUED | OUTPATIENT
Start: 2024-02-10 | End: 2024-02-14 | Stop reason: HOSPADM

## 2024-02-10 RX ORDER — ONDANSETRON 2 MG/ML
4 INJECTION INTRAMUSCULAR; INTRAVENOUS EVERY 6 HOURS PRN
Status: DISCONTINUED | OUTPATIENT
Start: 2024-02-10 | End: 2024-02-14 | Stop reason: HOSPADM

## 2024-02-10 RX ORDER — CIPROFLOXACIN HYDROCHLORIDE 3.5 MG/ML
2 SOLUTION/ DROPS TOPICAL
Status: DISCONTINUED | OUTPATIENT
Start: 2024-02-10 | End: 2024-02-11

## 2024-02-10 RX ORDER — FUROSEMIDE 10 MG/ML
40 INJECTION INTRAMUSCULAR; INTRAVENOUS EVERY 8 HOURS
Status: COMPLETED | OUTPATIENT
Start: 2024-02-10 | End: 2024-02-10

## 2024-02-10 RX ORDER — LISINOPRIL 40 MG/1
40 TABLET ORAL DAILY
Status: DISCONTINUED | OUTPATIENT
Start: 2024-02-10 | End: 2024-02-11

## 2024-02-10 RX ORDER — CIPROFLOXACIN HYDROCHLORIDE 3.5 MG/ML
2 SOLUTION/ DROPS TOPICAL
Status: DISCONTINUED | OUTPATIENT
Start: 2024-02-12 | End: 2024-02-11

## 2024-02-10 RX ORDER — SODIUM CHLORIDE 0.9 % (FLUSH) 0.9 %
10 SYRINGE (ML) INJECTION EVERY 12 HOURS SCHEDULED
Status: DISCONTINUED | OUTPATIENT
Start: 2024-02-10 | End: 2024-02-14 | Stop reason: HOSPADM

## 2024-02-10 RX ORDER — POLYETHYLENE GLYCOL 3350 17 G/17G
17 POWDER, FOR SOLUTION ORAL DAILY PRN
Status: DISCONTINUED | OUTPATIENT
Start: 2024-02-10 | End: 2024-02-14 | Stop reason: HOSPADM

## 2024-02-10 RX ORDER — ONDANSETRON 4 MG/1
4 TABLET, ORALLY DISINTEGRATING ORAL EVERY 6 HOURS PRN
Status: DISCONTINUED | OUTPATIENT
Start: 2024-02-10 | End: 2024-02-14 | Stop reason: HOSPADM

## 2024-02-10 RX ORDER — FUROSEMIDE 10 MG/ML
80 INJECTION INTRAMUSCULAR; INTRAVENOUS ONCE
Status: COMPLETED | OUTPATIENT
Start: 2024-02-10 | End: 2024-02-10

## 2024-02-10 RX ORDER — CIPROFLOXACIN HYDROCHLORIDE 3.5 MG/ML
2 SOLUTION/ DROPS TOPICAL
Status: DISCONTINUED | OUTPATIENT
Start: 2024-02-10 | End: 2024-02-10

## 2024-02-10 RX ORDER — SODIUM CHLORIDE 9 MG/ML
40 INJECTION, SOLUTION INTRAVENOUS AS NEEDED
Status: DISCONTINUED | OUTPATIENT
Start: 2024-02-10 | End: 2024-02-14 | Stop reason: HOSPADM

## 2024-02-10 RX ORDER — BISACODYL 5 MG/1
5 TABLET, DELAYED RELEASE ORAL DAILY PRN
Status: DISCONTINUED | OUTPATIENT
Start: 2024-02-10 | End: 2024-02-14 | Stop reason: HOSPADM

## 2024-02-10 RX ORDER — SODIUM CHLORIDE 0.9 % (FLUSH) 0.9 %
10 SYRINGE (ML) INJECTION AS NEEDED
Status: DISCONTINUED | OUTPATIENT
Start: 2024-02-10 | End: 2024-02-12

## 2024-02-10 RX ORDER — ACETAMINOPHEN 650 MG/1
650 SUPPOSITORY RECTAL EVERY 4 HOURS PRN
Status: DISCONTINUED | OUTPATIENT
Start: 2024-02-10 | End: 2024-02-14 | Stop reason: HOSPADM

## 2024-02-10 RX ORDER — ACETAMINOPHEN 325 MG/1
650 TABLET ORAL EVERY 4 HOURS PRN
Status: DISCONTINUED | OUTPATIENT
Start: 2024-02-10 | End: 2024-02-14 | Stop reason: HOSPADM

## 2024-02-10 RX ORDER — VANCOMYCIN HYDROCHLORIDE 1 G/200ML
1000 INJECTION, SOLUTION INTRAVENOUS EVERY 12 HOURS
Qty: 2000 ML | Refills: 0 | Status: DISCONTINUED | OUTPATIENT
Start: 2024-02-10 | End: 2024-02-11 | Stop reason: DRUGHIGH

## 2024-02-10 RX ORDER — SODIUM CHLORIDE 0.9 % (FLUSH) 0.9 %
10 SYRINGE (ML) INJECTION AS NEEDED
Status: DISCONTINUED | OUTPATIENT
Start: 2024-02-10 | End: 2024-02-14 | Stop reason: HOSPADM

## 2024-02-10 RX ORDER — NITROGLYCERIN 0.4 MG/1
0.4 TABLET SUBLINGUAL
Status: DISCONTINUED | OUTPATIENT
Start: 2024-02-10 | End: 2024-02-14 | Stop reason: HOSPADM

## 2024-02-10 RX ORDER — BISACODYL 10 MG
10 SUPPOSITORY, RECTAL RECTAL DAILY PRN
Status: DISCONTINUED | OUTPATIENT
Start: 2024-02-10 | End: 2024-02-14 | Stop reason: HOSPADM

## 2024-02-10 RX ORDER — ACETAMINOPHEN 160 MG/5ML
650 SOLUTION ORAL EVERY 4 HOURS PRN
Status: DISCONTINUED | OUTPATIENT
Start: 2024-02-10 | End: 2024-02-14 | Stop reason: HOSPADM

## 2024-02-10 RX ORDER — AMOXICILLIN 250 MG
2 CAPSULE ORAL 2 TIMES DAILY PRN
Status: DISCONTINUED | OUTPATIENT
Start: 2024-02-10 | End: 2024-02-14 | Stop reason: HOSPADM

## 2024-02-10 RX ADMIN — CEFTRIAXONE 2000 MG: 2 INJECTION, POWDER, FOR SOLUTION INTRAMUSCULAR; INTRAVENOUS at 10:18

## 2024-02-10 RX ADMIN — CIPROFLOXACIN 2 DROP: 3 SOLUTION OPHTHALMIC at 16:55

## 2024-02-10 RX ADMIN — CIPROFLOXACIN 2 DROP: 3 SOLUTION OPHTHALMIC at 22:02

## 2024-02-10 RX ADMIN — ASPIRIN 324 MG: 81 TABLET, CHEWABLE ORAL at 06:35

## 2024-02-10 RX ADMIN — Medication 10 ML: at 10:18

## 2024-02-10 RX ADMIN — CIPROFLOXACIN 2 DROP: 3 SOLUTION OPHTHALMIC at 20:35

## 2024-02-10 RX ADMIN — ACETAMINOPHEN 325MG 650 MG: 325 TABLET ORAL at 10:26

## 2024-02-10 RX ADMIN — Medication 10 ML: at 20:35

## 2024-02-10 RX ADMIN — Medication 3000 MG: at 11:28

## 2024-02-10 RX ADMIN — METOPROLOL TARTRATE 50 MG: 50 TABLET, FILM COATED ORAL at 14:06

## 2024-02-10 RX ADMIN — LISINOPRIL 40 MG: 40 TABLET ORAL at 14:06

## 2024-02-10 RX ADMIN — SODIUM HYPOCHLORITE: 1.25 SOLUTION TOPICAL at 20:35

## 2024-02-10 RX ADMIN — FUROSEMIDE 40 MG: 10 INJECTION, SOLUTION INTRAMUSCULAR; INTRAVENOUS at 23:04

## 2024-02-10 RX ADMIN — METOPROLOL TARTRATE 50 MG: 50 TABLET, FILM COATED ORAL at 20:35

## 2024-02-10 RX ADMIN — VANCOMYCIN HYDROCHLORIDE 1000 MG: 1 INJECTION, SOLUTION INTRAVENOUS at 23:04

## 2024-02-10 RX ADMIN — FUROSEMIDE 80 MG: 10 INJECTION, SOLUTION INTRAMUSCULAR; INTRAVENOUS at 06:31

## 2024-02-10 RX ADMIN — CIPROFLOXACIN 2 DROP: 3 SOLUTION OPHTHALMIC at 14:06

## 2024-02-10 RX ADMIN — CIPROFLOXACIN 2 DROP: 3 SOLUTION OPHTHALMIC at 18:00

## 2024-02-10 RX ADMIN — MORPHINE SULFATE 4 MG: 2 INJECTION, SOLUTION INTRAMUSCULAR; INTRAVENOUS at 06:36

## 2024-02-10 RX ADMIN — FUROSEMIDE 40 MG: 10 INJECTION, SOLUTION INTRAMUSCULAR; INTRAVENOUS at 14:06

## 2024-02-10 NOTE — LETTER
February 13, 2024     Patient: Richard Rodrigez   YOB: 1980   Date of Visit: 2/10/2024       To Whom It May Concern:    Tee Rodrigez has been inpatient at [facility] since 02/10/2024           Sincerely,        Ayde Tyler

## 2024-02-10 NOTE — ED PROVIDER NOTES
EMERGENCY DEPARTMENT ENCOUNTER  Room Number:  14/14  PCP: Jessie Almodovar III [id number]  Independent Historians: Patient      HPI:  Chief Complaint: had concerns including Wound Check and Groin Swelling.     A complete HPI/ROS/PMH/PSH/SH/FH are unobtainable due to: None    Chronic or social conditions impacting patient care (Social Determinants of Health): None      Context: Richard Rodrigez is a 44 y.o. male with a medical history of anasarca, morbid obesity, hypertension and pulmonary hypertension who presents to the ED c/o acute drainage from right ankle over the last week.  Patient reports he has chronic swelling and has been taking his medications but in the last few weeks he is also noted his scrotum more swollen.  No dysuria hematuria.  No chest pain or shortness of breath reported.  Unsure about weight gain.  Patient reports he had prior injury/scar to the lateral right ankle.  In the last week it is opened up and started draining blood and liquid.  Denies pain from this area.  Denies new injury.  Not aware any fevers.      Review of prior external notes (non-ED) -and- Review of prior external test results outside of this encounter:  Hospital discharge summary 12/13/2021 reviewed:  Patient admitted for groin swelling and urinary retention found to have anasarca, pulmonary hypertension, acute respiratory failure, obesity hypoventilation syndrome and hepatic steatosis as well as hypertension      PAST MEDICAL HISTORY  Active Ambulatory Problems     Diagnosis Date Noted    Anasarca 12/07/2021    Morbid (severe) obesity due to excess calories 12/07/2021    Tobacco use disorder 12/07/2021    Essential hypertension 12/07/2021    Hepatic steatosis 12/08/2021    Suspected sleep apnea 12/10/2021    Obesity hypoventilation syndrome 12/10/2021    Pulmonary hypertension 12/11/2021    Liver mass 01/10/2022    Hepatic hemangioma 02/10/2022     Resolved Ambulatory Problems     Diagnosis Date Noted    Acute  urinary retention 12/07/2021    Acute respiratory failure with hypoxia and hypercapnia 12/10/2021     Past Medical History:   Diagnosis Date    Hypertension          PAST SURGICAL HISTORY  Past Surgical History:   Procedure Laterality Date    FRACTURE SURGERY           FAMILY HISTORY  Family History   Problem Relation Age of Onset    Stroke Mother     Hypertension Mother     No Known Problems Sister     No Known Problems Brother     No Known Problems Sister     No Known Problems Sister     No Known Problems Sister     Breast cancer Maternal Grandmother     Colon cancer Neg Hx     Prostate cancer Neg Hx          SOCIAL HISTORY  Social History     Socioeconomic History    Marital status:    Tobacco Use    Smoking status: Every Day     Types: Cigars    Smokeless tobacco: Never   Substance and Sexual Activity    Alcohol use: Yes     Alcohol/week: 2.0 standard drinks of alcohol     Types: 2 Shots of liquor per week     Comment: occ    Drug use: Never    Sexual activity: Defer         ALLERGIES  Patient has no known allergies.      REVIEW OF SYSTEMS  Review of Systems  Included in HPI  All systems reviewed and negative except for those discussed in HPI.      PHYSICAL EXAM    I have reviewed the triage vital signs and nursing notes.    ED Triage Vitals   Temp Heart Rate Resp BP SpO2   02/10/24 0018 02/10/24 0018 02/10/24 0018 02/10/24 0034 02/10/24 0018   97.8 °F (36.6 °C) (!) 129 18 122/67 (!) 89 %      Temp src Heart Rate Source Patient Position BP Location FiO2 (%)   02/10/24 0018 02/10/24 0018 -- -- --   Oral Monitor          Physical Exam    Physical Exam   Constitutional: No distress.  Nontoxic  HENT:  Head: Normocephalic and atraumatic.   Oropharynx: Mucous membranes are moist.   Eyes: . No scleral icterus. No conjunctival pallor.  Neck: Normal range of motion. Neck supple.   Cardiovascular: Pink warm and well perfused throughout.    Pulmonary/Chest: No respiratory distress.  Mild tachypnea.  Diminished  breath sounds throughout.  Abdominal: Very large habitus that is distended.  4+ pitting edema of the abdominal wall and scrotum   musculoskeletal: 4+ pitting edema bilateral lower extremities with open wound over the right lateral malleolus with foul-smelling discharge.  No crepitance appreciated.  Neurological: Alert and oriented.  No acute focal deficit appreciated.  Skin: Skin is pink, warm, and dry.   Psychiatric: Mood and affect normal.   Nursing note and vitals reviewed.             LAB RESULTS  Recent Results (from the past 24 hour(s))   ECG 12 Lead Dyspnea    Collection Time: 02/10/24  2:33 AM   Result Value Ref Range    QT Interval 352 ms    QTC Interval 447 ms   Comprehensive Metabolic Panel    Collection Time: 02/10/24  3:11 AM    Specimen: Arm, Left; Blood   Result Value Ref Range    Glucose 65 65 - 99 mg/dL    BUN 40 (H) 6 - 20 mg/dL    Creatinine 1.28 (H) 0.76 - 1.27 mg/dL    Sodium 138 136 - 145 mmol/L    Potassium 5.0 3.5 - 5.2 mmol/L    Chloride 96 (L) 98 - 107 mmol/L    CO2 32.8 (H) 22.0 - 29.0 mmol/L    Calcium 9.3 8.6 - 10.5 mg/dL    Total Protein 8.0 6.0 - 8.5 g/dL    Albumin 3.0 (L) 3.5 - 5.2 g/dL    ALT (SGPT) 35 1 - 41 U/L    AST (SGOT) 68 (H) 1 - 40 U/L    Alkaline Phosphatase 145 (H) 39 - 117 U/L    Total Bilirubin 5.4 (H) 0.0 - 1.2 mg/dL    Globulin 5.0 gm/dL    A/G Ratio 0.6 g/dL    BUN/Creatinine Ratio 31.3 (H) 7.0 - 25.0    Anion Gap 9.2 5.0 - 15.0 mmol/L    eGFR 71.2 >60.0 mL/min/1.73   BNP    Collection Time: 02/10/24  3:11 AM    Specimen: Arm, Left; Blood   Result Value Ref Range    proBNP 3,398.0 (H) 0.0 - 450.0 pg/mL   High Sensitivity Troponin T    Collection Time: 02/10/24  3:11 AM    Specimen: Arm, Left; Blood   Result Value Ref Range    HS Troponin T 88 (C) <22 ng/L   Sedimentation Rate    Collection Time: 02/10/24  3:11 AM    Specimen: Arm, Left; Blood   Result Value Ref Range    Sed Rate 19 (H) 0 - 15 mm/hr   C-reactive Protein    Collection Time: 02/10/24  3:11 AM     Specimen: Arm, Left; Blood   Result Value Ref Range    C-Reactive Protein 2.52 (H) 0.00 - 0.50 mg/dL   Lactic Acid, Plasma    Collection Time: 02/10/24  3:11 AM    Specimen: Arm, Left; Blood   Result Value Ref Range    Lactate 2.2 (C) 0.5 - 2.0 mmol/L   Procalcitonin    Collection Time: 02/10/24  3:11 AM    Specimen: Arm, Left; Blood   Result Value Ref Range    Procalcitonin 0.37 (H) 0.00 - 0.25 ng/mL   CBC Auto Differential    Collection Time: 02/10/24  3:11 AM    Specimen: Arm, Left; Blood   Result Value Ref Range    WBC 6.08 3.40 - 10.80 10*3/mm3    RBC 6.50 (H) 4.14 - 5.80 10*6/mm3    Hemoglobin 16.8 13.0 - 17.7 g/dL    Hematocrit 56.6 (H) 37.5 - 51.0 %    MCV 87.1 79.0 - 97.0 fL    MCH 25.8 (L) 26.6 - 33.0 pg    MCHC 29.7 (L) 31.5 - 35.7 g/dL    RDW 19.8 (H) 12.3 - 15.4 %    RDW-SD 57.9 (H) 37.0 - 54.0 fl    MPV 10.2 6.0 - 12.0 fL    Platelets 194 140 - 450 10*3/mm3   Magnesium    Collection Time: 02/10/24  3:11 AM    Specimen: Arm, Left; Blood   Result Value Ref Range    Magnesium 2.2 1.6 - 2.6 mg/dL         RADIOLOGY  No Radiology Exams Resulted Within Past 24 Hours      MEDICATIONS GIVEN IN ER  Medications   sodium chloride 0.9 % flush 10 mL (has no administration in time range)   aspirin chewable tablet 324 mg (has no administration in time range)   vancomycin 3000 mg/500 mL 0.9% NS IVPB ([facility]) (has no administration in time range)   cefTRIAXone (ROCEPHIN) 2,000 mg in sodium chloride 0.9 % 100 mL IVPB-VTB (has no administration in time range)   furosemide (LASIX) injection 80 mg (has no administration in time range)   morphine injection 4 mg (has no administration in time range)         ORDERS PLACED DURING THIS VISIT:  Orders Placed This Encounter   Procedures    Wound Culture - Wound, Ankle, Right    Blood Culture - Blood,    Blood Culture - Blood,    XR Chest 1 View    XR Ankle 3+ View Right    Comprehensive Metabolic Panel    BNP    High Sensitivity Troponin T    Sedimentation Rate    C-reactive Protein     Lactic Acid, Plasma    Procalcitonin    CBC Auto Differential    Magnesium    Manual Differential    STAT Lactic Acid, Reflex    High Sensitivity Troponin T 2Hr    TSH    T4, Free    Bilirubin, Direct    Monitor Blood Pressure    Pulse Oximetry, Continuous    Irrigate wound    Wound Dressing    Okay for sips and ice chips  Misc Nursing Order (Specify)    [facility] (on-call MD unless specified) Details    ECG 12 Lead Dyspnea    Insert Peripheral IV    Inpatient Admission    CBC & Differential         OUTPATIENT MEDICATION MANAGEMENT:  Current Facility-Administered Medications Ordered in Epic   Medication Dose Route Frequency Provider Last Rate Last Admin    aspirin chewable tablet 324 mg  324 mg Oral Once Jono Houston MD        cefTRIAXone (ROCEPHIN) 2,000 mg in sodium chloride 0.9 % 100 mL IVPB-VTB  2,000 mg Intravenous Once Jono Houston MD        furosemide (LASIX) injection 80 mg  80 mg Intravenous Once Jono Houston MD        morphine injection 4 mg  4 mg Intravenous Once Jono Houston MD        sodium chloride 0.9 % flush 10 mL  10 mL Intravenous PRN Jono Houston MD        vancomycin 3000 mg/500 mL 0.9% NS IVPB ([facility])  20 mg/kg Intravenous Once Jono Houston MD         Current Outpatient Medications Ordered in Epic   Medication Sig Dispense Refill    furosemide (LASIX) 40 MG tablet Take 1 tablet by mouth 2 (Two) Times a Day. 180 tablet 3    lisinopril (PRINIVIL,ZESTRIL) 40 MG tablet Take 1 tablet by mouth Daily. 90 tablet 3    metoprolol tartrate (LOPRESSOR) 50 MG tablet Take 1 tablet by mouth 2 (Two) Times a Day. 180 tablet 3         PROCEDURES  Procedures    Total critical care time: Approximately 40 minutes    Due to a high probability of clinically significant, life threatening deterioration, the patient required my highest level of preparedness to intervene emergently and I personally spent this critical care time directly and personally managing the patient. This critical care time included obtaining  a history; examining the patient; vital sign monitoring; ordering and review of studies; arranging urgent treatment with development of a management plan; evaluation of patient's response to treatment; frequent reassessment; and, discussions with other providers.    This critical care time was performed to assess and manage the high probability of imminent, life-threatening deterioration that could result in multi-organ failure. It was exclusive of separately billable procedures and treating other patients and teaching time.    Please see MDM section and the rest of the note for further information on patient assessment and treatment.      PROGRESS, DATA ANALYSIS, CONSULTS, AND MEDICAL DECISION MAKING  All labs have been independently interpreted by me.  All radiology studies have been reviewed by me. All EKG's have been independently viewed and interpreted by me.  Discussion below represents my analysis of pertinent findings related to patient's condition, differential diagnosis, treatment plan and final disposition.    Differential diagnosis:   My diagnosis for lower extremity pain and injury includes but is not limited to hip fracture, femur fracture, hip dislocation, hip contusion, hip sprain, hip strain, pelvic fracture, ischio-tibial band pain, ischio-tibial band bursitis, knee sprain, patella dislocation, knee dislocation, internal derangement of knee, fractures of the femur, tibia, fibula, ankle, foot and digits, ankle sprain, ankle dislocation, Lisfranc fracture, fracture dislocations of the digits, pulmonary embolism, claudication, peripheral vascular disease, gout, osteoarthritis, rheumatoid arthritis, bursitis, septic joint, poly-rheumatica, polyarthralgia and other inflammatory or infectious disease processes.      Clinical Scores:                  ED Course as of 02/10/24 0434   Sat Feb 10, 2024   0232 Patient will almost certainly need to stay in the hospital for IV diuretics and further evaluation of  the possibility of osteomyelitis related to the right lower extremity wound.  Initiate laboratory and radiologic evaluation.  Patient agreeable. [RS]   0239 EKG           EKG time: 0233  Rhythm/Rate: Sinus, 100  P waves and IA: CARLI within normal limits  QRS, axis: Poor R wave progression, low amplitude QRS  ST and T waves: No STEMI; QTc within normal limits    Interpreted Contemporaneously by me, independently viewed  Comparison: 12/7/2021-similar rate and QRS appearance   [RS]   0259 RADIOLOGY      Study: Right ankle-3 views  Findings: Arthritic change with apparent fusion of the distal fibula and tibia.  Soft tissue deformity identified but no other bony erosion appreciated.  I independently viewed and interpreted these images contemporaneously with treatment.    [RS]   0300 RADIOLOGY      Study: Single view chest  Findings: Cardiomegaly with some blunting of the left costophrenic angle  I independently viewed and interpreted these images contemporaneously with treatment.    [RS]   0415 Sed Rate(!): 19 [RS]   0416 Lactate(!!): 2.2 [RS]   0416 proBNP(!): 3,398.0 [RS]   0416 BUN(!): 40 [RS]   0416 Creatinine(!): 1.28 [RS]   0416 Sodium: 138 [RS]   0416 Potassium: 5.0 [RS]   0416 AST (SGOT)(!): 68 [RS]   0416 Alkaline Phosphatase(!): 145 [RS]   0416 Total Bilirubin(!): 5.4 [RS]   0416 C-Reactive Protein(!): 2.52 [RS]   0416 eGFR: 71.2 [RS]   0416 HS Troponin T(!!): 88 [RS]   0417 WBC: 6.08 [RS]   0417 Hemoglobin: 16.8 [RS]   0417 Platelets: 194 [RS]   0417 Patient clearly volume overloaded.  Given the space that we have with his kidneys we will initiate furosemide.  Blood cultures will be drawn and antibiotics were initiated for probable right lower extremity wound infection causing sepsis. [RS]   0427 Reviewed all findings with patient.  Agreeable with admission plan.  Requesting some pain medication after wound cleansing as he has more pain at this time. [RS]   0433 CONSULT        Provider: MYRIAM   [facility]    Discussion: Reviewed patient history, ED presentation and evaluation as well as therapies initiated in the ED and response to therapies.  Agreeable to accept the patient for admission on behalf of Dr. Piper.    Agreeable c treatment and planned disposition.         [RS]      ED Course User Index  [RS] Jono Houston MD         Prescription drug monitoring program review: NA    AS OF 04:34 EST VITALS:    BP - 122/67  HR - 114  TEMP - 97.8 °F (36.6 °C) (Oral)  O2 SATS - 92%    COMPLEXITY OF CARE  The patient requires admission.      DIAGNOSIS  Final diagnoses:   Anasarca   Open wound-right lower extremity   Acute kidney injury (DIEGO) with acute tubular necrosis (ATN)   Elevated troponin   Sepsis, due to unspecified organism, unspecified whether acute organ dysfunction present   Hyperbilirubinemia         DISPOSITION  ED Disposition       ED Disposition   Decision to Admit    Condition   --    Comment   Level of Care: Telemetry [5]   Diagnosis: Anasarca [[id number]]   Admitting Physician: BAILEY PIPER [[id number]]   Certification: I Certify That Inpatient Hospital Services Are Medically Necessary For Greater Than 2 Midnights                    ADMISSION    Discussed treatment plan and reason for admission with pt/family and admitting physician.  Pt/family voiced understanding of the plan for admission for further testing/treatment as needed.       Please note that portions of this document were completed with a voice recognition program.    Note Disclaimer: At [facility], we believe that sharing information builds trust and better relationships. You are receiving this note because you recently visited [facility]. It is possible you will see health information before a provider has talked with you about it. This kind of information can be easy to misunderstand. To help you fully understand what it means for your health, we urge you to discuss this note with your provider.         Jono Houston,  MD  02/10/24 0434

## 2024-02-10 NOTE — PROGRESS NOTES
Name: Richard Rodrigez ADMIT: 2/10/2024   : 1980  PCP: Jessie Almodovar III, NP-C    MRN: [id number] LOS: 0 days   AGE/SEX: 44 y.o. male  ROOM: [address]    [place], Subsequent Hospital Care    Chief Complaint   Patient presents with    Wound Check    Groin Swelling     CC: Lack of access for hemodialysis  Subjective     44 y.o. male with lack of access for hemodialysis.  Patient did have a left brachial axillary graft placement on Monday.  Patient is aware risk benefits complications and agrees to procedure.  He will stay in the hospital since then as he is poorly compliant and follow-up.  His current arm pain is likely cervical radiculopathy in nature and will not change with current access attempt.    Review of Systems no current complaints    Objective     Scheduled Medications:   [START ON 2024] cefTRIAXone, 2,000 mg, Intravenous, Q24H  ciprofloxacin, 2 drop, Both Eyes, Q2H While Awake   Followed by  [START ON 2024] ciprofloxacin, 2 drop, Both Eyes, Q4H While Awake  furosemide, 40 mg, Intravenous, Q8H  lisinopril, 40 mg, Oral, Daily  metoprolol tartrate, 50 mg, Oral, BID  sodium chloride, 10 mL, Intravenous, Q12H  sodium hypochlorite, , Topical, Q12H  vancomycin, 1,000 mg, Intravenous, Q12H        Active Infusions:  Pharmacy to dose vancomycin,         As Needed Medications:    acetaminophen **OR** acetaminophen **OR** acetaminophen    senna-docusate sodium **AND** polyethylene glycol **AND** bisacodyl **AND** bisacodyl    calcium carbonate    nitroglycerin    ondansetron ODT **OR** ondansetron    Pharmacy to dose vancomycin    [COMPLETED] Insert Peripheral IV **AND** sodium chloride    sodium chloride    sodium chloride    Vital Signs  Vital Signs Patient Vitals for the past 24 hrs:   BP Temp Temp src Pulse Resp SpO2 Height Weight   02/10/24 1355 150/80 97.7 °F (36.5 °C) Oral 91 18 -- -- --   02/10/24 0850 -- -- Oral -- 20 -- -- --   02/10/24 0759 136/80 --  "-- 96 -- 93 % -- --   02/10/24 0703 142/69 -- -- 99 -- 96 % -- --   02/10/24 0631 149/84 -- -- 102 -- -- -- --   02/10/24 0601 162/90 -- -- 98 -- -- -- --   02/10/24 0501 160/93 -- -- 98 -- 93 % -- --   02/10/24 0034 122/67 -- -- 114 20 92 % -- --   02/10/24 0018 -- 97.8 °F (36.6 °C) Oral (!) 129 18 (!) 89 % 182.9 cm (72\") (!) 145 kg (320 lb)     Vital Signs (range)  Temp:  [97.7 °F (36.5 °C)-97.8 °F (36.6 °C)] 97.7 °F (36.5 °C)  Heart Rate:  [] 91  Resp:  [18-20] 18  BP: (122-162)/(67-93) 150/80  I/O:  No intake/output data recorded.  I/O:   Intake/Output Summary (Last 24 hours) at 2/10/2024 1618  Last data filed at 2/10/2024 1355  Gross per 24 hour   Intake --   Output 1625 ml   Net -1625 ml     BMI:  Body mass index is 43.4 kg/m².    Physical Exam:  Physical Exam   Lungs coarse but equal  Abdomen benign  Extremities viable    Results Review:     CBC    Results from last 7 days   Lab Units 02/10/24  0711 02/10/24  0311   WBC 10*3/mm3 6.50 6.08   HEMOGLOBIN g/dL 16.5 16.8   PLATELETS 10*3/mm3 202 194     BMP   Results from last 7 days   Lab Units 02/10/24  0711 02/10/24  0311   SODIUM mmol/L 139 138   POTASSIUM mmol/L 5.0 5.0   CHLORIDE mmol/L 98 96*   CO2 mmol/L 30.5* 32.8*   BUN mg/dL 39* 40*   CREATININE mg/dL 1.22 1.28*   GLUCOSE mg/dL 76 65   MAGNESIUM mg/dL  --  2.2     Cr Clearance Estimated Creatinine Clearance: 115.9 mL/min (by C-G formula based on SCr of 1.22 mg/dL).  Coag     HbA1C   Lab Results   Component Value Date    HGBA1C 6.30 (H) 02/10/2024    HGBA1C 6.40 (H) 12/07/2021     Blood Glucose No results found for: \"POCGLU\"  Infection   Results from last 7 days   Lab Units 02/10/24  0311   PROCALCITONIN ng/mL 0.37*     CMP   Results from last 7 days   Lab Units 02/10/24  0711 02/10/24  0311   SODIUM mmol/L 139 138   POTASSIUM mmol/L 5.0 5.0   CHLORIDE mmol/L 98 96*   CO2 mmol/L 30.5* 32.8*   BUN mg/dL 39* 40*   CREATININE mg/dL 1.22 1.28*   GLUCOSE mg/dL 76 65   ALBUMIN g/dL  --  3.1*  3.0* "   BILIRUBIN mg/dL  --  5.5*  5.4*   ALK PHOS U/L  --  142*  145*   AST (SGOT) U/L  --  68*  68*   ALT (SGPT) U/L  --  35  35     Radiology(recent) XR Ankle 3+ View Right    Result Date: 2/10/2024  Electronically signed by Jono Cifuentes MD on 02-10-24 at 0706    XR Chest 1 View    Result Date: 2/10/2024  Electronically signed by Jono Cifuentes MD on 02-10-24 at 0657     Assessment & Plan     Assessment & Plan      Anasarca    Morbid (severe) obesity    Tobacco use disorder    Essential hypertension    Suspected sleep apnea    Obesity hypoventilation syndrome    Pulmonary hypertension    Wound infection    Bacterial conjunctivitis    Prediabetes    Diastolic CHF, acute    Abrasion of ankle with infection, right, initial encounter    Cellulitis of right ankle      44 y.o. male with need for hemodialysis access.  Left arm forearm shunt will be placed to the brachio axillary or axillary axillary loop.  Decision will be made intraoperatively.  Patient is understanding of risk benefits complications of the procedure and consents to the procedure.      Michele Anaya MD  02/10/24  16:17 EST    Please call my office with any question: [phone]    Active Hospital Problems    Diagnosis  POA    **Anasarca [R60.1]  Yes    Wound infection [T14.8XXA, L08.9]  Yes    Bacterial conjunctivitis [H10.9]  Yes    Prediabetes [R73.03]  Yes    Diastolic CHF, acute [I50.31]  Yes    Abrasion of ankle with infection, right, initial encounter [S90.511A, L08.9]  Yes    Cellulitis of right ankle [L03.115]  Yes    Pulmonary hypertension [I27.20]  Yes    Suspected sleep apnea [R29.818]  Yes    Obesity hypoventilation syndrome [E66.2]  Yes    Morbid (severe) obesity [E66.01]  Yes    Tobacco use disorder [F17.200]  Yes    Essential hypertension [I10]  Yes      Resolved Hospital Problems   No resolved problems to display.

## 2024-02-10 NOTE — PROGRESS NOTES
"[facility] Clinical Pharmacy Services: Vancomycin Pharmacokinetic Initial Consult Note    Richard Rodrigez is a 44 y.o. male who is on day 1 of pharmacy to dose vancomycin.    Indication: Skin and Soft Tissue  Consulting Provider: Keyla Galdamez  Planned Duration of Therapy: 5 days  Loading Dose Ordered : 3000 mg on 2/10 at to be given  MRSA PCR performed: no  Culture/Source: Bcx & wound cultures ordered  Target: -600 mg/L.hr   Pertinent Vanc Dosing History:   Other Antimicrobials: Ceftriaxone    Vitals/Labs  Ht: 182.9 cm (72\"); Wt: (!) 145 kg (320 lb)  Temp Readings from Last 1 Encounters:   02/10/24 97.8 °F (36.6 °C) (Oral)    Estimated Creatinine Clearance: 110.5 mL/min (A) (by C-G formula based on SCr of 1.28 mg/dL (H)).        Results from last 7 days   Lab Units 02/10/24  0311   CREATININE mg/dL 1.28*   WBC 10*3/mm3 6.08     Assessment/Plan:    Vancomycin Dose:   1000 mg IV every  12  hours  Predictive AUC level for the dose ordered is 482 mg/L.hr, which is within the target of 400-600 mg/L.hr  Vanc Random has been ordered for 2/12 at 0530     Pharmacy will follow patient's kidney function and will adjust doses and obtain levels as necessary. Thank you for involving pharmacy in this patient's care. Please contact pharmacy with any questions or concerns.                           Cody Albert, [facility]  Clinical Pharmacist   "

## 2024-02-10 NOTE — LETTER
February 14, 2024     Patient: Richard Rodrigez   YOB: 1980   Date of Visit: 2/10/2024       To Whom It May Concern:    It is my medical opinion that Richard Rodrigez may return to work on 02/26/2024 .           Sincerely,        ASHA Messer

## 2024-02-10 NOTE — H&P
Patient Name:  Richard Rodrigez  YOB: 1980  MRN:  [id number]  Admit Date:  2/10/2024  Patient Care Team:  Jessie Almodovar III, NP-C as PCP - General (Family Medicine)      Subjective   History Present Illness     Chief Complaint   Patient presents with    Wound Check    Groin Swelling       Mr. Rodrigez is a 44 y.o. male with a history of anasarca, pulmonary hypertension, suspected sleep apnea, obesity hypoventilation syndrome, acute respiratory failure with hypoxia and hypercapnia, hepatic steatosis, morbid obesity, tobacco abuse, alcohol abuse, hypertension that presents to [facility] complaining of bloody and infectious appearing drainage from right ankle wound for several days.  He previously had a right ankle fracture and hardware with second surgical procedure to remove the hardware several years ago.  The skin in that area has remained thin and scarred.  He denies any injury or trauma to the area recently but states the skin came open.  No fever, chills, sweating, nausea or vomiting.  Also has noticed increased swelling of the right leg and abdomen and severe swelling of scrotum for several days.  He denies shortness of breath, cough, orthopnea, chest pain, pressure or palpitations.  He has been compliant with all medications including his Lasix.  Has been watching his diet and eats no salt.  Unsure if he has gained any weight and does not weigh himself regularly.    Patient was hospitalized here at [facility] in December 2021 with anasarca and pulmonary hypertension, followed by pulmonology and cardiology.  Anasarca was not felt to be related to congestive heart failure at that point.  There was noted severe chronic CO2 retention consistent with the pulmonary hypertension and obesity hypoventilation syndrome.  BiPAP was trialed and the patient did not tolerate.  He was discharged with recommendations to have a sleep study.  He was diuresed during that stay  and lost over 30 pounds.  He was started on antihypertensives.  There was some abnormality suggestive of liver mass  and instructions given to follow-up with outpatient MRI of the liver and GI outpatient appointment.     His eyes are red and irritated.  He denies having any knowledge of his eyes being red.  Eyes are not painful but they do itch a little bit and has had a lot of drainage in both eyes, crusted shut with thick crust in the morning, unsure how long.  -November 2023 he had irritated and bloodshot eyes beginning on the left and spreading to the right, he was seen in urgent care and treated for bacterial conjunctivitis with ophthalmic Cipro drops.      He was also found to be hypoxic at 85% on room air at the urgent care visit.  Does not wear oxygen at home or a CPAP.  He did not have appointment with sleep clinic after his hospitalization in 2021.  I explained and reiterated to the patient the critical importance of having an outpatient sleep study to treat his sleep apnea, which left untreated could be contributor to his increased swelling, as well as other cardiovascular risks and potential early death.  He verbalizes understanding.  We will ask CCP to schedule appointment with sleep medicine and provide the patient with the information he needs to attend this appointment.    Smokes cigars.  Denies drinking alcohol.  Works full-time as a .    Review of Systems   Constitutional:  Negative for appetite change, chills, diaphoresis, fatigue and fever.   HENT:  Negative for congestion.    Respiratory:  Negative for cough, choking, chest tightness and shortness of breath.    Cardiovascular:  Positive for leg swelling. Negative for chest pain and palpitations.   Gastrointestinal:  Positive for abdominal distention. Negative for abdominal pain, constipation, diarrhea, nausea and vomiting.   Genitourinary:  Negative for difficulty urinating and dysuria.   Musculoskeletal:  Negative for myalgias.   Skin:   Positive for wound. Negative for rash.   Neurological:  Negative for dizziness, weakness, light-headedness, numbness and headaches.        Personal History     Past Medical History:   Diagnosis Date    Acute respiratory failure with hypoxia and hypercapnia 12/10/2021    Hypertension      Past Surgical History:   Procedure Laterality Date    FRACTURE SURGERY       Family History   Problem Relation Age of Onset    Stroke Mother     Hypertension Mother     No Known Problems Sister     No Known Problems Brother     No Known Problems Sister     No Known Problems Sister     No Known Problems Sister     Breast cancer Maternal Grandmother     Colon cancer Neg Hx     Prostate cancer Neg Hx      Social History     Tobacco Use    Smoking status: Every Day     Types: Cigars    Smokeless tobacco: Never   Vaping Use    Vaping Use: Never used   Substance Use Topics    Alcohol use: Yes     Alcohol/week: 2.0 standard drinks of alcohol     Types: 2 Shots of liquor per week     Comment: occ    Drug use: Never     No current facility-administered medications on file prior to encounter.     Current Outpatient Medications on File Prior to Encounter   Medication Sig Dispense Refill    furosemide (LASIX) 40 MG tablet Take 1 tablet by mouth 2 (Two) Times a Day. 180 tablet 3    lisinopril (PRINIVIL,ZESTRIL) 40 MG tablet Take 1 tablet by mouth Daily. 90 tablet 3    metoprolol tartrate (LOPRESSOR) 50 MG tablet Take 1 tablet by mouth 2 (Two) Times a Day. 180 tablet 3     No Known Allergies    Objective    Objective     Vital Signs  Temp:  [97.8 °F (36.6 °C)] 97.8 °F (36.6 °C)  Heart Rate:  [] 96  Resp:  [18-20] 20  BP: (122-162)/(67-93) 136/80  SpO2:  [89 %-96 %] 93 %  on  Flow (L/min):  [2] 2;   Device (Oxygen Therapy): nasal cannula  Body mass index is 43.4 kg/m².    Physical Exam  Constitutional:       General: He is not in acute distress.     Appearance: He is not diaphoretic.      Comments: Ill-appearing, morbidly obese  Anasarca    HENT:      Head: Normocephalic and atraumatic.   Eyes:      Extraocular Movements: Extraocular movements intact.      Comments: Bloodshot eyes   Cardiovascular:      Rate and Rhythm: Normal rate and regular rhythm.      Pulses: Normal pulses.      Heart sounds: No murmur heard.     Comments: DP PT and PT pulses palpable, +2  Heart and lung sounds distant and difficult to assess secondary to body habitus  Pulmonary:      Effort: No respiratory distress.      Comments: Shallow  Abdominal:      General: Bowel sounds are normal. There is no distension.      Palpations: Abdomen is soft.      Tenderness: There is no abdominal tenderness.   Musculoskeletal:         General: Swelling present.      Cervical back: Normal range of motion and neck supple.      Comments: Moderate to severe edema of abdomen, scrotum, bilateral legs   Skin:     General: Skin is warm and dry.      Comments: Right outer malleolus wound with slough and clear yellow drainage, surrounding tissue red, superficial layer of skin peeled off   Neurological:      General: No focal deficit present.      Mental Status: He is oriented to person, place, and time.   Psychiatric:         Mood and Affect: Mood normal.             Results Review:  I reviewed the patient's new clinical results.      Lab Results (last 24 hours)       Procedure Component Value Units Date/Time    CBC & Differential [[id number]]  (Abnormal) Collected: 02/10/24 0311    Specimen: Blood from Arm, Left Updated: 02/10/24 0416    Narrative:      The following orders were created for panel order CBC & Differential.  Procedure                               Abnormality         Status                     ---------                               -----------         ------                     CBC Auto Differential[[id number]]        Abnormal            Final result                 Please view results for these tests on the individual orders.    Comprehensive Metabolic Panel [[id number]]  (Abnormal)  Collected: 02/10/24 0311    Specimen: Blood from Arm, Left Updated: 02/10/24 0345     Glucose 65 mg/dL      BUN 40 mg/dL      Creatinine 1.28 mg/dL      Sodium 138 mmol/L      Potassium 5.0 mmol/L      Comment: Slight hemolysis detected by analyzer. Result may be falsely elevated.        Chloride 96 mmol/L      CO2 32.8 mmol/L      Calcium 9.3 mg/dL      Total Protein 8.0 g/dL      Albumin 3.0 g/dL      ALT (SGPT) 35 U/L      AST (SGOT) 68 U/L      Alkaline Phosphatase 145 U/L      Total Bilirubin 5.4 mg/dL      Globulin 5.0 gm/dL      A/G Ratio 0.6 g/dL      BUN/Creatinine Ratio 31.3     Anion Gap 9.2 mmol/L      eGFR 71.2 mL/min/1.73     Narrative:      GFR Normal >60  Chronic Kidney Disease <60  Kidney Failure <15      BNP [[id number]]  (Abnormal) Collected: 02/10/24 0311    Specimen: Blood from Arm, Left Updated: 02/10/24 0351     proBNP 3,398.0 pg/mL     Narrative:      This assay is used as an aid in the diagnosis of individuals suspected of having heart failure. It can be used as an aid in the diagnosis of acute decompensated heart failure (ADHF) in patients presenting with signs and symptoms of ADHF to the emergency department (ED). In addition, NT-proBNP of <300 pg/mL indicates ADHF is not likely.    Age Range Result Interpretation  NT-proBNP Concentration (pg/mL:      <50             Positive            >450                   Gray                 300-450                    Negative             <300    50-75           Positive            >900                  Gray                300-900                  Negative            <300      >75             Positive            >1800                  Gray                300-1800                  Negative            <300    High Sensitivity Troponin T [[id number]]  (Abnormal) Collected: 02/10/24 0311    Specimen: Blood from Arm, Left Updated: 02/10/24 0353     HS Troponin T 88 ng/L     Narrative:      High Sensitive Troponin T Reference Range:  <14.0 ng/L- Negative  "Female for AMI  <22.0 ng/L- Negative Male for AMI  >=14 - Abnormal Female indicating possible myocardial injury.  >=22 - Abnormal Male indicating possible myocardial injury.   Clinicians would have to utilize clinical acumen, EKG, Troponin, and serial changes to determine if it is an Acute Myocardial Infarction or myocardial injury due to an underlying chronic condition.         Sedimentation Rate [[id number]]  (Abnormal) Collected: 02/10/24 0311    Specimen: Blood from Arm, Left Updated: 02/10/24 0355     Sed Rate 19 mm/hr     C-reactive Protein [[id number]]  (Abnormal) Collected: 02/10/24 0311    Specimen: Blood from Arm, Left Updated: 02/10/24 0344     C-Reactive Protein 2.52 mg/dL     Lactic Acid, Plasma [[id number]]  (Abnormal) Collected: 02/10/24 0311    Specimen: Blood from Arm, Left Updated: 02/10/24 0353     Lactate 2.2 mmol/L     Procalcitonin [[id number]]  (Abnormal) Collected: 02/10/24 0311    Specimen: Blood from Arm, Left Updated: 02/10/24 0351     Procalcitonin 0.37 ng/mL     Narrative:      As a Marker for Sepsis (Non-Neonates):    1. <0.5 ng/mL represents a low risk of severe sepsis and/or septic shock.  2. >2 ng/mL represents a high risk of severe sepsis and/or septic shock.    As a Marker for Lower Respiratory Tract Infections that require antibiotic therapy:    PCT on Admission    Antibiotic Therapy       6-12 Hrs later    >0.5                Strongly Recommended  >0.25 - <0.5        Recommended   0.1 - 0.25          Discouraged              Remeasure/reassess PCT  <0.1                Strongly Discouraged     Remeasure/reassess PCT    As 28 day mortality risk marker: \"Change in Procalcitonin Result\" (>80% or <=80%) if Day 0 (or Day 1) and Day 4 values are available. Refer to http://www.[facility]-pct-calculator.com    Change in PCT <=80%  A decrease of PCT levels below or equal to 80% defines a positive change in PCT test result representing a higher risk for 28-day all-cause mortality of patients " diagnosed with severe sepsis for septic shock.    Change in PCT >80%  A decrease of PCT levels of more than 80% defines a negative change in PCT result representing a lower risk for 28-day all-cause mortality of patients diagnosed with severe sepsis or septic shock.       CBC Auto Differential [[id number]]  (Abnormal) Collected: 02/10/24 0311    Specimen: Blood from Arm, Left Updated: 02/10/24 0416     WBC 6.08 10*3/mm3      RBC 6.50 10*6/mm3      Hemoglobin 16.8 g/dL      Hematocrit 56.6 %      MCV 87.1 fL      MCH 25.8 pg      MCHC 29.7 g/dL      RDW 19.8 %      RDW-SD 57.9 fl      MPV 10.2 fL      Platelets 194 10*3/mm3     Magnesium [[id number]]  (Normal) Collected: 02/10/24 0311    Specimen: Blood from Arm, Left Updated: 02/10/24 0345     Magnesium 2.2 mg/dL     Manual Differential [[id number]]  (Abnormal) Collected: 02/10/24 0311    Specimen: Blood from Arm, Left Updated: 02/10/24 0446     Neutrophil % 75.9 %      Lymphocyte % 19.5 %      Monocyte % 4.6 %      Neutrophils Absolute 4.61 10*3/mm3      Lymphocytes Absolute 1.19 10*3/mm3      Monocytes Absolute 0.28 10*3/mm3      Anisocytosis Mod/2+     Macrocytes Mod/2+     Poikilocytes Mod/2+     Polychromasia Mod/2+     Target Cells Mod/2+     Smudge Cells Slight/1+     Platelet Morphology Normal    TSH [[id number]]  (Normal) Collected: 02/10/24 0311    Specimen: Blood from Arm, Left Updated: 02/10/24 0455     TSH 4.040 uIU/mL     T4, Free [[id number]]  (Normal) Collected: 02/10/24 0311    Specimen: Blood from Arm, Left Updated: 02/10/24 0455     Free T4 1.03 ng/dL     Narrative:      Results may be falsely increased if patient taking Biotin.      Bilirubin, Direct [[id number]]  (Abnormal) Collected: 02/10/24 0311    Specimen: Blood from Arm, Left Updated: 02/10/24 0525     Bilirubin, Direct 3.9 mg/dL     Hepatic Function Panel [[id number]]  (Abnormal) Collected: 02/10/24 0311    Specimen: Blood from Arm, Left Updated: 02/10/24 0700     Total Protein 8.3 g/dL       Albumin 3.1 g/dL      ALT (SGPT) 35 U/L      AST (SGOT) 68 U/L      Alkaline Phosphatase 142 U/L      Total Bilirubin 5.5 mg/dL      Bilirubin, Direct 3.9 mg/dL      Comment: Specimen hemolyzed. Results may be affected.        Bilirubin, Indirect 1.6 mg/dL     Wound Culture - Wound, Ankle, Right [[id number]] Collected: 02/10/24 0314    Specimen: Wound from Ankle, Right Updated: 02/10/24 0317    STAT Lactic Acid, Reflex [[id number]]  (Normal) Collected: 02/10/24 0711    Specimen: Blood Updated: 02/10/24 0743     Lactate 1.7 mmol/L     High Sensitivity Troponin T 2Hr [[id number]]  (Abnormal) Collected: 02/10/24 0711    Specimen: Blood Updated: 02/10/24 0750     HS Troponin T 76 ng/L      Troponin T Delta -12 ng/L     Narrative:      High Sensitive Troponin T Reference Range:  <14.0 ng/L- Negative Female for AMI  <22.0 ng/L- Negative Male for AMI  >=14 - Abnormal Female indicating possible myocardial injury.  >=22 - Abnormal Male indicating possible myocardial injury.   Clinicians would have to utilize clinical acumen, EKG, Troponin, and serial changes to determine if it is an Acute Myocardial Infarction or myocardial injury due to an underlying chronic condition.         Blood Culture - Blood, Arm, Right [[id number]] Collected: 02/10/24 0711    Specimen: Blood from Arm, Right Updated: 02/10/24 0723    Basic Metabolic Panel [[id number]]  (Abnormal) Collected: 02/10/24 0711    Specimen: Blood Updated: 02/10/24 0749     Glucose 76 mg/dL      BUN 39 mg/dL      Creatinine 1.22 mg/dL      Sodium 139 mmol/L      Potassium 5.0 mmol/L      Chloride 98 mmol/L      CO2 30.5 mmol/L      Calcium 9.0 mg/dL      BUN/Creatinine Ratio 32.0     Anion Gap 10.5 mmol/L      eGFR 75.4 mL/min/1.73     Narrative:      GFR Normal >60  Chronic Kidney Disease <60  Kidney Failure <15      CBC (No Diff) [[id number]]  (Abnormal) Collected: 02/10/24 0711    Specimen: Blood Updated: 02/10/24 0744     WBC 6.50 10*3/mm3      RBC 6.36 10*6/mm3       Hemoglobin 16.5 g/dL      Hematocrit 55.4 %      MCV 87.1 fL      MCH 25.9 pg      MCHC 29.8 g/dL      RDW 20.2 %      RDW-SD 56.7 fl      MPV 10.5 fL      Platelets 202 10*3/mm3     Hemoglobin A1c [[id number]]  (Abnormal) Collected: 02/10/24 0711    Specimen: Blood Updated: 02/10/24 1123     Hemoglobin A1C 6.30 %     Narrative:      Hemoglobin A1C Ranges:    Increased Risk for Diabetes  5.7% to 6.4%  Diabetes                     >= 6.5%  Diabetic Goal                < 7.0%    Blood Culture - Blood, Arm, Left [[id number]] Collected: 02/10/24 0859    Specimen: Blood from Arm, Left Updated: 02/10/24 0913            Imaging Results (Last 24 Hours)       Procedure Component Value Units Date/Time    XR Ankle 3+ View Right [[id number]] Collected: 02/10/24 0706     Updated: 02/10/24 0706    Narrative:        Patient: RENATO ORR  Time Out: 07:06  Exam(s): XR RIGHT ANKLE     EXAM:    XR Right Ankle Complete, 3 or More Views    CLINICAL HISTORY:     Reason for exam: Open wound.    TECHNIQUE:    Frontal, lateral and oblique views of the right ankle.    COMPARISON:    No relevant prior studies available.    FINDINGS:    Bones joints:  Advanced degenerative changes throughout the entire   ankle without evidence of erosive or destructive change.  No fractures or   dislocations identified diffuse soft tissue swelling about the ankle.    Fusion of the distal tibia and fibula.    Soft tissues:  See above.    IMPRESSION:         No acute findings in the right ankle.    Extensive degenerative changes described above      Impression:          Electronically signed by Jono Cifuentes MD on 02-10-24 at 0706    XR Chest 1 View [[id number]] Collected: 02/10/24 0658     Updated: 02/10/24 0658    Narrative:        Patient: RENATO ORR  Time Out: 06:57  Exam(s): XR CXR 1 VIEW     EXAM:    XR Chest, 1 View    CLINICAL HISTORY:     Reason for exam: volume overload.    TECHNIQUE:    Frontal view of the chest.    COMPARISON:    No relevant  prior studies available.    FINDINGS:    Lungs:  Unremarkable.  No consolidation.    Pleural space:  Unremarkable.  No pneumothorax.    Heart:  Cardiomegaly.    Mediastinum:  Unremarkable.  Normal mediastinal contour.    Bones joints:  Unremarkable.  No acute fracture.    IMPRESSION:         No acute findings in the chest.      Impression:          Electronically signed by Jono Cifuentes MD on 02-10-24 at 0657            Results for orders placed during the hospital encounter of 12/07/21    Adult Transthoracic Echo Complete W/ Cont if Necessary Per Protocol    Interpretation Summary  · Left ventricular wall thickness is consistent with mild concentric hypertrophy.  · Estimated left ventricular EF = 57% Left ventricular systolic function is normal.  · Left ventricular diastolic function was indeterminate.  · Mildly reduced right ventricular systolic function noted.      ECG 12 Lead Dyspnea   Preliminary Result   HEART RATE= 97  bpm   RR Interval= 619  ms   SD Interval= 197  ms   P Horizontal Axis= -16  deg   P Front Axis= 73  deg   QRSD Interval= 101  ms   QT Interval= 352  ms   QTcB= 447  ms   QRS Axis= 125  deg   T Wave Axis= 14  deg   - ABNORMAL ECG -   Sinus rhythm   Borderline prolonged SD interval   Right axis deviation   Low voltage, precordial leads   Consider anterior infarct   Electronically Signed By:    Date and Time of Study: 2024-02-10 02:33:53           Assessment/Plan     Active Hospital Problems    Diagnosis  POA    **Anasarca [R60.1]  Yes    Wound infection [T14.8XXA, L08.9]  Unknown    Bacterial conjunctivitis [H10.9]  Unknown    Prediabetes [R73.03]  Unknown    Pulmonary hypertension [I27.20]  Yes    Suspected sleep apnea [R29.818]  Yes    Obesity hypoventilation syndrome [E66.2]  Yes    Morbid (severe) obesity [E66.01]  Yes    Tobacco use disorder [F17.200]  Yes    Essential hypertension [I10]  Yes      Resolved Hospital Problems   No resolved problems to display.       Received 80 mg of  Lasix around 430 this morning and has had more than 1000 mL of urine output.  Resume Lasix 40 mg every 8 hours x 2 doses.  Monitor labs in the morning.  Slight bump in the creatinine on initial, now decreased.  Check echocardiogram.  Consider cardiology consult, Dr. Moreno's group familiar with this patient.  Education provided regarding daily weight and when to call PCP with swelling/weight gain.    Continue vancomycin and Rocephin for wound infection.  Dakin's 1/4 strength 0.125% wet-to-dry gauze, wrapped loosely with Kerlix.  Wound culture and blood cultures pending.  Increased sed rate and CRP.elevated lactate and procalcitonin with normal white count.  Check MRI of the right ankle without contrast. Consult vascular surgery.  Follow cultures.    Ciprofloxacin ophthalmic solution for bacterial conjunctivitis, 2 drops every 2 hours while awake x 2 days, then every 4 hours x 5 days.    CCP consult for assistance with setting up appointment with sleep medicine.  Continuous pulse oximetry monitoring, likely will need oxygen at night.    Nutrition consult for weight loss diet and prediabetes diet education.  Follow-up with PCP and recommend close monitoring/counseling to assess progress towards goals.    Resume antihypertensive regimen.        I discussed the patient's findings and my recommendations with patient.    VTE Prophylaxis -no lovenox for now until Vascular consult/MRI results.   Code Status - Full code.       QUIQUE Block  [facility]  02/10/24  13:12 EST

## 2024-02-10 NOTE — ED NOTES
Dr. Silvestre contacted in regards to Blood culture difficulty.  Notified that lab is coming to attempt d/t difficulty.  Dr. Silvestre instructed to hold antibiotics until blood cultures drawn d/t patient being hemodynamically stable at this time.  /102 HR 97.

## 2024-02-10 NOTE — ED NOTES
.Nursing report ED to floor  Richard Rodrigez  44 y.o.  male    HPI :   Chief Complaint   Patient presents with    Wound Check    Groin Swelling       Admitting doctor:   Manolo Silvestre MD    Admitting diagnosis:   The primary encounter diagnosis was Anasarca. Diagnoses of Open wound-right lower extremity, Acute kidney injury (DIEGO) with acute tubular necrosis (ATN), Elevated troponin, Sepsis, due to unspecified organism, unspecified whether acute organ dysfunction present, and Hyperbilirubinemia were also pertinent to this visit.    Code status:   Current Code Status       Date Active Code Status Order ID Comments User Context       2/10/2024 0437 CPR (Attempt to Resuscitate) [id number]  Keyla Galdamez APRN ED        Question Answer    Code Status (Patient has no pulse and is not breathing) CPR (Attempt to Resuscitate)    Medical Interventions (Patient has pulse or is breathing) Full Support                    Allergies:   Patient has no known allergies.    Isolation:   No active isolations    Intake and Output  No intake or output data in the 24 hours ending 02/10/24 0723    Weight:       02/10/24  0018   Weight: (!) 145 kg (320 lb)       Most recent vitals:   Vitals:    02/10/24 0034 02/10/24 0501 02/10/24 0601 02/10/24 0631   BP: 122/67 160/93 162/90 149/84   Pulse: 114 98 98 102   Resp: 20      Temp:       TempSrc:       SpO2: 92% 93%     Weight:       Height:           Active LDAs/IV Access:   Lines, Drains & Airways       Active LDAs       Name Placement date Placement time Site Days    Peripheral IV 02/10/24 0312 Left Antecubital 02/10/24  0312  Antecubital  less than 1                    Labs (abnormal labs have a star):   Labs Reviewed   COMPREHENSIVE METABOLIC PANEL - Abnormal; Notable for the following components:       Result Value    BUN 40 (*)     Creatinine 1.28 (*)     Chloride 96 (*)     CO2 32.8 (*)     Albumin 3.0 (*)     AST (SGOT) 68 (*)     Alkaline Phosphatase 145 (*)      Total Bilirubin 5.4 (*)     BUN/Creatinine Ratio 31.3 (*)     All other components within normal limits    Narrative:     GFR Normal >60  Chronic Kidney Disease <60  Kidney Failure <15     BNP (IN-HOUSE) - Abnormal; Notable for the following components:    proBNP 3,398.0 (*)     All other components within normal limits    Narrative:     This assay is used as an aid in the diagnosis of individuals suspected of having heart failure. It can be used as an aid in the diagnosis of acute decompensated heart failure (ADHF) in patients presenting with signs and symptoms of ADHF to the emergency department (ED). In addition, NT-proBNP of <300 pg/mL indicates ADHF is not likely.    Age Range Result Interpretation  NT-proBNP Concentration (pg/mL:      <50             Positive            >450                   Gray                 300-450                    Negative             <300    50-75           Positive            >900                  Gray                300-900                  Negative            <300      >75             Positive            >1800                  Gray                300-1800                  Negative            <300   TROPONIN - Abnormal; Notable for the following components:    HS Troponin T 88 (*)     All other components within normal limits    Narrative:     High Sensitive Troponin T Reference Range:  <14.0 ng/L- Negative Female for AMI  <22.0 ng/L- Negative Male for AMI  >=14 - Abnormal Female indicating possible myocardial injury.  >=22 - Abnormal Male indicating possible myocardial injury.   Clinicians would have to utilize clinical acumen, EKG, Troponin, and serial changes to determine if it is an Acute Myocardial Infarction or myocardial injury due to an underlying chronic condition.        SEDIMENTATION RATE - Abnormal; Notable for the following components:    Sed Rate 19 (*)     All other components within normal limits   C-REACTIVE PROTEIN - Abnormal; Notable for the following components:  "   C-Reactive Protein 2.52 (*)     All other components within normal limits   LACTIC ACID, PLASMA - Abnormal; Notable for the following components:    Lactate 2.2 (*)     All other components within normal limits   PROCALCITONIN - Abnormal; Notable for the following components:    Procalcitonin 0.37 (*)     All other components within normal limits    Narrative:     As a Marker for Sepsis (Non-Neonates):    1. <0.5 ng/mL represents a low risk of severe sepsis and/or septic shock.  2. >2 ng/mL represents a high risk of severe sepsis and/or septic shock.    As a Marker for Lower Respiratory Tract Infections that require antibiotic therapy:    PCT on Admission    Antibiotic Therapy       6-12 Hrs later    >0.5                Strongly Recommended  >0.25 - <0.5        Recommended   0.1 - 0.25          Discouraged              Remeasure/reassess PCT  <0.1                Strongly Discouraged     Remeasure/reassess PCT    As 28 day mortality risk marker: \"Change in Procalcitonin Result\" (>80% or <=80%) if Day 0 (or Day 1) and Day 4 values are available. Refer to [email or web address]    Change in PCT <=80%  A decrease of PCT levels below or equal to 80% defines a positive change in PCT test result representing a higher risk for 28-day all-cause mortality of patients diagnosed with severe sepsis for septic shock.    Change in PCT >80%  A decrease of PCT levels of more than 80% defines a negative change in PCT result representing a lower risk for 28-day all-cause mortality of patients diagnosed with severe sepsis or septic shock.      CBC WITH AUTO DIFFERENTIAL - Abnormal; Notable for the following components:    RBC 6.50 (*)     Hematocrit 56.6 (*)     MCH 25.8 (*)     MCHC 29.7 (*)     RDW 19.8 (*)     RDW-SD 57.9 (*)     All other components within normal limits   MANUAL DIFFERENTIAL - Abnormal; Notable for the following components:    Lymphocyte % 19.5 (*)     Monocyte % 4.6 (*)     All other components " within normal limits   BILIRUBIN, DIRECT - Abnormal; Notable for the following components:    Bilirubin, Direct 3.9 (*)     All other components within normal limits   HEPATIC FUNCTION PANEL - Abnormal; Notable for the following components:    Albumin 3.1 (*)     AST (SGOT) 68 (*)     Alkaline Phosphatase 142 (*)     Total Bilirubin 5.5 (*)     Bilirubin, Direct 3.9 (*)     All other components within normal limits   MAGNESIUM - Normal   TSH - Normal   T4, FREE - Normal    Narrative:     Results may be falsely increased if patient taking Biotin.     WOUND CULTURE   BLOOD CULTURE   BLOOD CULTURE   LACTIC ACID, REFLEX   HIGH SENSITIVITIY TROPONIN T 2HR   BASIC METABOLIC PANEL   CBC (NO DIFF)   CBC AND DIFFERENTIAL    Narrative:     The following orders were created for panel order CBC & Differential.  Procedure                               Abnormality         Status                     ---------                               -----------         ------                     CBC Auto Differential[[id number]]        Abnormal            Final result                 Please view results for these tests on the individual orders.       EKG:   ECG 12 Lead Dyspnea   Preliminary Result   HEART RATE= 97  bpm   RR Interval= 619  ms   NY Interval= 197  ms   P Horizontal Axis= -16  deg   P Front Axis= 73  deg   QRSD Interval= 101  ms   QT Interval= 352  ms   QTcB= 447  ms   QRS Axis= 125  deg   T Wave Axis= 14  deg   - ABNORMAL ECG -   Sinus rhythm   Borderline prolonged NY interval   Right axis deviation   Low voltage, precordial leads   Consider anterior infarct   Electronically Signed By:    Date and Time of Study: 2024-02-10 02:33:53          Meds given in ED:   Medications   sodium chloride 0.9 % flush 10 mL (has no administration in time range)   vancomycin 3000 mg/500 mL 0.9% NS IVPB ([facility]) (has no administration in time range)   cefTRIAXone (ROCEPHIN) 2,000 mg in sodium chloride 0.9 % 100 mL IVPB-VTB (has no administration in  time range)   sodium chloride 0.9 % flush 10 mL (has no administration in time range)   sodium chloride 0.9 % flush 10 mL (has no administration in time range)   sodium chloride 0.9 % infusion 40 mL (has no administration in time range)   nitroglycerin (NITROSTAT) SL tablet 0.4 mg (has no administration in time range)   acetaminophen (TYLENOL) tablet 650 mg (has no administration in time range)     Or   acetaminophen (TYLENOL) 160 MG/5ML oral solution 650 mg (has no administration in time range)     Or   acetaminophen (TYLENOL) suppository 650 mg (has no administration in time range)   sennosides-docusate (PERICOLACE) 8.6-50 MG per tablet 2 tablet (has no administration in time range)     And   polyethylene glycol (MIRALAX) packet 17 g (has no administration in time range)     And   bisacodyl (DULCOLAX) EC tablet 5 mg (has no administration in time range)     And   bisacodyl (DULCOLAX) suppository 10 mg (has no administration in time range)   ondansetron ODT (ZOFRAN-ODT) disintegrating tablet 4 mg (has no administration in time range)     Or   ondansetron (ZOFRAN) injection 4 mg (has no administration in time range)   calcium carbonate (TUMS) chewable tablet 500 mg (200 mg elemental) (has no administration in time range)   Pharmacy to dose vancomycin (has no administration in time range)   cefTRIAXone (ROCEPHIN) 2,000 mg in sodium chloride 0.9 % 100 mL IVPB-VTB (has no administration in time range)   vancomycin (VANCOCIN) 1000 mg/200 mL dextrose 5% IVPB (has no administration in time range)   aspirin chewable tablet 324 mg (324 mg Oral Given 2/10/24 0635)   furosemide (LASIX) injection 80 mg (80 mg Intravenous Given 2/10/24 0631)   morphine injection 4 mg (4 mg Intravenous Given 2/10/24 0636)       Imaging results:  XR Ankle 3+ View Right    Result Date: 2/10/2024  Electronically signed by Jono Cifuentes MD on 02-10-24 at 0706    XR Chest 1 View    Result Date: 2/10/2024  Electronically signed by Jono  MD Vane on 02-10-24 at 0657     Ambulatory status:   - Assist    Social issues:   Social History     Socioeconomic History    Marital status:    Tobacco Use    Smoking status: Every Day     Types: Cigars    Smokeless tobacco: Never   Substance and Sexual Activity    Alcohol use: Yes     Alcohol/week: 2.0 standard drinks of alcohol     Types: 2 Shots of liquor per week     Comment: occ    Drug use: Never    Sexual activity: Defer       Eliel Hewitt RN  02/10/24 07:23 EST

## 2024-02-10 NOTE — ED TRIAGE NOTES
"To ER via PV.  Large open wound to rt lateral ankle.  Pt states it is a scar from an old injury that has opened up.  States bleeding at times.  Pt states \"I hold water\".  C/o penile swelling. States testicles are not swollen.  Pt takes Lasix BID and states he has been compliant.   "

## 2024-02-11 ENCOUNTER — APPOINTMENT (OUTPATIENT)
Dept: CARDIOLOGY | Facility: HOSPITAL | Age: 44
End: 2024-02-11
Payer: COMMERCIAL

## 2024-02-11 ENCOUNTER — APPOINTMENT (OUTPATIENT)
Dept: MRI IMAGING | Facility: HOSPITAL | Age: 44
End: 2024-02-11
Payer: COMMERCIAL

## 2024-02-11 LAB
ANION GAP SERPL CALCULATED.3IONS-SCNC: 9.3 MMOL/L (ref 5–15)
AORTIC DIMENSIONLESS INDEX: 0.9 (DI)
ASCENDING AORTA: 3.1 CM
BH CV ECHO MEAS - ACS: 2.9 CM
BH CV ECHO MEAS - AO MAX PG: 5.2 MMHG
BH CV ECHO MEAS - AO MEAN PG: 3 MMHG
BH CV ECHO MEAS - AO ROOT DIAM: 3.7 CM
BH CV ECHO MEAS - AO V2 MAX: 114 CM/SEC
BH CV ECHO MEAS - AO V2 VTI: 21.6 CM
BH CV ECHO MEAS - AVA(I,D): 4.4 CM2
BH CV ECHO MEAS - EDV(CUBED): 151.7 ML
BH CV ECHO MEAS - EDV(MOD-SP2): 95 ML
BH CV ECHO MEAS - EDV(MOD-SP4): 116 ML
BH CV ECHO MEAS - EF(MOD-BP): 62.3 %
BH CV ECHO MEAS - EF(MOD-SP2): 53.7 %
BH CV ECHO MEAS - EF(MOD-SP4): 69 %
BH CV ECHO MEAS - ESV(CUBED): 25.7 ML
BH CV ECHO MEAS - ESV(MOD-SP2): 44 ML
BH CV ECHO MEAS - ESV(MOD-SP4): 36 ML
BH CV ECHO MEAS - FS: 44.7 %
BH CV ECHO MEAS - IVS/LVPW: 1 CM
BH CV ECHO MEAS - IVSD: 1.12 CM
BH CV ECHO MEAS - LA DIMENSION: 3.6 CM
BH CV ECHO MEAS - LAT PEAK E' VEL: 13.6 CM/SEC
BH CV ECHO MEAS - LV DIASTOLIC VOL/BSA (35-75): 44.6 CM2
BH CV ECHO MEAS - LV MASS(C)D: 236.1 GRAMS
BH CV ECHO MEAS - LV MAX PG: 4.8 MMHG
BH CV ECHO MEAS - LV MEAN PG: 3 MMHG
BH CV ECHO MEAS - LV SYSTOLIC VOL/BSA (12-30): 13.8 CM2
BH CV ECHO MEAS - LV V1 MAX: 110 CM/SEC
BH CV ECHO MEAS - LV V1 VTI: 19.9 CM
BH CV ECHO MEAS - LVIDD: 5.3 CM
BH CV ECHO MEAS - LVIDS: 3 CM
BH CV ECHO MEAS - LVOT AREA: 4.8 CM2
BH CV ECHO MEAS - LVOT DIAM: 2.47 CM
BH CV ECHO MEAS - LVPWD: 1.12 CM
BH CV ECHO MEAS - MED PEAK E' VEL: 6 CM/SEC
BH CV ECHO MEAS - MV A DUR: 0.11 SEC
BH CV ECHO MEAS - MV A MAX VEL: 67.3 CM/SEC
BH CV ECHO MEAS - MV DEC SLOPE: 591.9 CM/SEC2
BH CV ECHO MEAS - MV DEC TIME: 0.22 SEC
BH CV ECHO MEAS - MV E MAX VEL: 93.4 CM/SEC
BH CV ECHO MEAS - MV E/A: 1.39
BH CV ECHO MEAS - MV MAX PG: 4.3 MMHG
BH CV ECHO MEAS - MV MEAN PG: 1.68 MMHG
BH CV ECHO MEAS - MV P1/2T: 52.4 MSEC
BH CV ECHO MEAS - MV V2 VTI: 19.9 CM
BH CV ECHO MEAS - MVA(P1/2T): 4.2 CM2
BH CV ECHO MEAS - MVA(VTI): 4.8 CM2
BH CV ECHO MEAS - PA ACC TIME: 0.1 SEC
BH CV ECHO MEAS - PI END-D VEL: 183.2 CM/SEC
BH CV ECHO MEAS - RAP SYSTOLE: 15 MMHG
BH CV ECHO MEAS - RVSP: 47 MMHG
BH CV ECHO MEAS - SI(MOD-SP2): 19.6 ML/M2
BH CV ECHO MEAS - SI(MOD-SP4): 30.8 ML/M2
BH CV ECHO MEAS - SUP REN AO DIAM: 1.9 CM
BH CV ECHO MEAS - SV(LVOT): 95.5 ML
BH CV ECHO MEAS - SV(MOD-SP2): 51 ML
BH CV ECHO MEAS - SV(MOD-SP4): 80 ML
BH CV ECHO MEAS - TAPSE (>1.6): 2.6 CM
BH CV ECHO MEAS - TR MAX PG: 31.8 MMHG
BH CV ECHO MEAS - TR MAX VEL: 282 CM/SEC
BH CV ECHO MEASUREMENTS AVERAGE E/E' RATIO: 9.53
BH CV ECHO SHUNT ASSESSMENT PERFORMED (HIDDEN SCRIPTING): 1
BH CV LOWER ARTERIAL LEFT ABI RATIO: 0.92
BH CV LOWER ARTERIAL LEFT DORSALIS PEDIS SYS MAX: 123
BH CV LOWER ARTERIAL LEFT GREAT TOE SYS MAX: 90
BH CV LOWER ARTERIAL LEFT POST TIBIAL SYS MAX: 107
BH CV LOWER ARTERIAL LEFT TBI RATIO: 0.67
BH CV LOWER ARTERIAL RIGHT ABI RATIO: 0.95
BH CV LOWER ARTERIAL RIGHT DORSALIS PEDIS SYS MAX: 124
BH CV LOWER ARTERIAL RIGHT GREAT TOE SYS MAX: 86
BH CV LOWER ARTERIAL RIGHT POST TIBIAL SYS MAX: 127
BH CV LOWER ARTERIAL RIGHT TBI RATIO: 0.64
BH CV LOWER VASCULAR LEFT COMMON FEMORAL AUGMENT: NORMAL
BH CV LOWER VASCULAR LEFT COMMON FEMORAL COMPETENT: NORMAL
BH CV LOWER VASCULAR LEFT COMMON FEMORAL COMPRESS: NORMAL
BH CV LOWER VASCULAR LEFT COMMON FEMORAL PHASIC: NORMAL
BH CV LOWER VASCULAR LEFT COMMON FEMORAL SPONT: NORMAL
BH CV LOWER VASCULAR LEFT DISTAL FEMORAL COMPRESS: NORMAL
BH CV LOWER VASCULAR LEFT GASTRONEMIUS COMPRESS: NORMAL
BH CV LOWER VASCULAR LEFT GREATER SAPH AK COMPRESS: NORMAL
BH CV LOWER VASCULAR LEFT GREATER SAPH BK COMPRESS: NORMAL
BH CV LOWER VASCULAR LEFT LESSER SAPH COMPRESS: NORMAL
BH CV LOWER VASCULAR LEFT MID FEMORAL AUGMENT: NORMAL
BH CV LOWER VASCULAR LEFT MID FEMORAL COMPETENT: NORMAL
BH CV LOWER VASCULAR LEFT MID FEMORAL COMPRESS: NORMAL
BH CV LOWER VASCULAR LEFT MID FEMORAL PHASIC: NORMAL
BH CV LOWER VASCULAR LEFT MID FEMORAL SPONT: NORMAL
BH CV LOWER VASCULAR LEFT PERONEAL COMPRESS: NORMAL
BH CV LOWER VASCULAR LEFT POPLITEAL AUGMENT: NORMAL
BH CV LOWER VASCULAR LEFT POPLITEAL COMPETENT: NORMAL
BH CV LOWER VASCULAR LEFT POPLITEAL COMPRESS: NORMAL
BH CV LOWER VASCULAR LEFT POPLITEAL PHASIC: NORMAL
BH CV LOWER VASCULAR LEFT POPLITEAL SPONT: NORMAL
BH CV LOWER VASCULAR LEFT POSTERIOR TIBIAL COMPRESS: NORMAL
BH CV LOWER VASCULAR LEFT PROFUNDA FEMORAL COMPRESS: NORMAL
BH CV LOWER VASCULAR LEFT PROXIMAL FEMORAL COMPRESS: NORMAL
BH CV LOWER VASCULAR LEFT SAPHENOFEMORAL JUNCTION COMPRESS: NORMAL
BH CV LOWER VASCULAR RIGHT COMMON FEMORAL AUGMENT: NORMAL
BH CV LOWER VASCULAR RIGHT COMMON FEMORAL COMPETENT: NORMAL
BH CV LOWER VASCULAR RIGHT COMMON FEMORAL COMPRESS: NORMAL
BH CV LOWER VASCULAR RIGHT COMMON FEMORAL PHASIC: NORMAL
BH CV LOWER VASCULAR RIGHT COMMON FEMORAL SPONT: NORMAL
BH CV LOWER VASCULAR RIGHT DISTAL FEMORAL COMPRESS: NORMAL
BH CV LOWER VASCULAR RIGHT GASTRONEMIUS COMPRESS: NORMAL
BH CV LOWER VASCULAR RIGHT GREATER SAPH AK COMPRESS: NORMAL
BH CV LOWER VASCULAR RIGHT GREATER SAPH BK COMPRESS: NORMAL
BH CV LOWER VASCULAR RIGHT LESSER SAPH COMPRESS: NORMAL
BH CV LOWER VASCULAR RIGHT MID FEMORAL AUGMENT: NORMAL
BH CV LOWER VASCULAR RIGHT MID FEMORAL COMPETENT: NORMAL
BH CV LOWER VASCULAR RIGHT MID FEMORAL COMPRESS: NORMAL
BH CV LOWER VASCULAR RIGHT MID FEMORAL PHASIC: NORMAL
BH CV LOWER VASCULAR RIGHT MID FEMORAL SPONT: NORMAL
BH CV LOWER VASCULAR RIGHT PERONEAL COMPRESS: NORMAL
BH CV LOWER VASCULAR RIGHT POPLITEAL AUGMENT: NORMAL
BH CV LOWER VASCULAR RIGHT POPLITEAL COMPETENT: NORMAL
BH CV LOWER VASCULAR RIGHT POPLITEAL COMPRESS: NORMAL
BH CV LOWER VASCULAR RIGHT POPLITEAL PHASIC: NORMAL
BH CV LOWER VASCULAR RIGHT POPLITEAL SPONT: NORMAL
BH CV LOWER VASCULAR RIGHT POSTERIOR TIBIAL COMPRESS: NORMAL
BH CV LOWER VASCULAR RIGHT PROFUNDA FEMORAL COMPRESS: NORMAL
BH CV LOWER VASCULAR RIGHT PROXIMAL FEMORAL COMPRESS: NORMAL
BH CV LOWER VASCULAR RIGHT SAPHENOFEMORAL JUNCTION COMPRESS: NORMAL
BH CV XLRA - RV BASE: 6.5 CM
BH CV XLRA - RV LENGTH: 9.9 CM
BH CV XLRA - RV MID: 3.8 CM
BH CV XLRA - TDI S': 17.1 CM/SEC
BUN SERPL-MCNC: 27 MG/DL (ref 6–20)
BUN/CREAT SERPL: 27 (ref 7–25)
CALCIUM SPEC-SCNC: 8.4 MG/DL (ref 8.6–10.5)
CHLORIDE SERPL-SCNC: 95 MMOL/L (ref 98–107)
CO2 SERPL-SCNC: 36.7 MMOL/L (ref 22–29)
CREAT SERPL-MCNC: 1 MG/DL (ref 0.76–1.27)
DEPRECATED RDW RBC AUTO: 54.7 FL (ref 37–54)
EGFRCR SERPLBLD CKD-EPI 2021: 95.8 ML/MIN/1.73
ERYTHROCYTE [DISTWIDTH] IN BLOOD BY AUTOMATED COUNT: 19.7 % (ref 12.3–15.4)
GLUCOSE SERPL-MCNC: 86 MG/DL (ref 65–99)
HCT VFR BLD AUTO: 55.1 % (ref 37.5–51)
HGB BLD-MCNC: 16.7 G/DL (ref 13–17.7)
LEFT ATRIUM VOLUME INDEX: 20.7 ML/M2
MCH RBC QN AUTO: 25.7 PG (ref 26.6–33)
MCHC RBC AUTO-ENTMCNC: 30.3 G/DL (ref 31.5–35.7)
MCV RBC AUTO: 84.9 FL (ref 79–97)
PLATELET # BLD AUTO: 161 10*3/MM3 (ref 140–450)
PMV BLD AUTO: 10.9 FL (ref 6–12)
POTASSIUM SERPL-SCNC: 4.5 MMOL/L (ref 3.5–5.2)
QT INTERVAL: 352 MS
QTC INTERVAL: 447 MS
RBC # BLD AUTO: 6.49 10*6/MM3 (ref 4.14–5.8)
SINUS: 3.4 CM
SODIUM SERPL-SCNC: 141 MMOL/L (ref 136–145)
STJ: 3.1 CM
UPPER ARTERIAL LEFT ARM BRACHIAL SYS MAX: 133
UPPER ARTERIAL RIGHT ARM BRACHIAL SYS MAX: 134
WBC NRBC COR # BLD AUTO: 6.09 10*3/MM3 (ref 3.4–10.8)

## 2024-02-11 PROCEDURE — 25010000002 FUROSEMIDE PER 20 MG: Performed by: INTERNAL MEDICINE

## 2024-02-11 PROCEDURE — 93970 EXTREMITY STUDY: CPT

## 2024-02-11 PROCEDURE — 80048 BASIC METABOLIC PNL TOTAL CA: CPT | Performed by: INTERNAL MEDICINE

## 2024-02-11 PROCEDURE — 25010000002 CEFTRIAXONE PER 250 MG: Performed by: NURSE PRACTITIONER

## 2024-02-11 PROCEDURE — 93922 UPR/L XTREMITY ART 2 LEVELS: CPT

## 2024-02-11 PROCEDURE — 36415 COLL VENOUS BLD VENIPUNCTURE: CPT | Performed by: INTERNAL MEDICINE

## 2024-02-11 PROCEDURE — 93306 TTE W/DOPPLER COMPLETE: CPT | Performed by: INTERNAL MEDICINE

## 2024-02-11 PROCEDURE — 85027 COMPLETE CBC AUTOMATED: CPT | Performed by: INTERNAL MEDICINE

## 2024-02-11 PROCEDURE — 25810000003 SODIUM CHLORIDE 0.9 % SOLUTION: Performed by: NURSE PRACTITIONER

## 2024-02-11 PROCEDURE — 93306 TTE W/DOPPLER COMPLETE: CPT

## 2024-02-11 PROCEDURE — 25510000001 PERFLUTREN (DEFINITY) 8.476 MG IN SODIUM CHLORIDE (PF) 0.9 % 10 ML INJECTION: Performed by: INTERNAL MEDICINE

## 2024-02-11 PROCEDURE — 25010000002 VANCOMYCIN 10 G RECONSTITUTED SOLUTION: Performed by: NURSE PRACTITIONER

## 2024-02-11 RX ORDER — FUROSEMIDE 10 MG/ML
40 INJECTION INTRAMUSCULAR; INTRAVENOUS EVERY 8 HOURS
Status: COMPLETED | OUTPATIENT
Start: 2024-02-11 | End: 2024-02-11

## 2024-02-11 RX ORDER — LISINOPRIL 10 MG/1
10 TABLET ORAL DAILY
Status: DISCONTINUED | OUTPATIENT
Start: 2024-02-12 | End: 2024-02-13

## 2024-02-11 RX ORDER — TRAMADOL HYDROCHLORIDE 50 MG/1
50 TABLET ORAL EVERY 8 HOURS PRN
Status: DISCONTINUED | OUTPATIENT
Start: 2024-02-11 | End: 2024-02-14 | Stop reason: HOSPADM

## 2024-02-11 RX ADMIN — Medication 10 ML: at 20:24

## 2024-02-11 RX ADMIN — CEFTRIAXONE 2000 MG: 2 INJECTION, POWDER, FOR SOLUTION INTRAMUSCULAR; INTRAVENOUS at 05:11

## 2024-02-11 RX ADMIN — TRAMADOL HYDROCHLORIDE 50 MG: 50 TABLET ORAL at 18:20

## 2024-02-11 RX ADMIN — METOPROLOL TARTRATE 50 MG: 50 TABLET, FILM COATED ORAL at 20:24

## 2024-02-11 RX ADMIN — SODIUM HYPOCHLORITE: 1.25 SOLUTION TOPICAL at 20:24

## 2024-02-11 RX ADMIN — METOPROLOL TARTRATE 50 MG: 50 TABLET, FILM COATED ORAL at 15:35

## 2024-02-11 RX ADMIN — ACETAMINOPHEN 325MG 650 MG: 325 TABLET ORAL at 00:15

## 2024-02-11 RX ADMIN — CIPROFLOXACIN 2 DROP: 3 SOLUTION OPHTHALMIC at 06:10

## 2024-02-11 RX ADMIN — Medication 10 ML: at 15:38

## 2024-02-11 RX ADMIN — FUROSEMIDE 40 MG: 10 INJECTION, SOLUTION INTRAMUSCULAR; INTRAVENOUS at 20:24

## 2024-02-11 RX ADMIN — FUROSEMIDE 40 MG: 10 INJECTION, SOLUTION INTRAMUSCULAR; INTRAVENOUS at 15:35

## 2024-02-11 RX ADMIN — VANCOMYCIN HYDROCHLORIDE 1250 MG: 10 INJECTION, POWDER, LYOPHILIZED, FOR SOLUTION INTRAVENOUS at 15:36

## 2024-02-11 RX ADMIN — ACETAMINOPHEN 325MG 650 MG: 325 TABLET ORAL at 06:14

## 2024-02-11 RX ADMIN — PERFLUTREN 3 ML: 6.52 INJECTION, SUSPENSION INTRAVENOUS at 11:53

## 2024-02-11 NOTE — PROGRESS NOTES
"[facility] Clinical Pharmacy Services: Vancomycin Monitoring Note    Richard Rodrigez is a 44 y.o. male who is on day 2 of pharmacy to dose vancomycin for Skin and Soft Tissue infection (RLE open wound).    Previous Vancomycin dosin mg x1 at 1128 2/10/24 (loading dose given late per MD request due to difficulty drawing blood cultures).     Updated Cultures and Sensitivities:   2/10 BC pending  2/10 WC pending      Vitals/Labs  Ht: 182.9 cm (72\"); Wt: (!) 145 kg (320 lb)   Temp Readings from Last 1 Encounters:   24 97.5 °F (36.4 °C) (Oral)     Estimated Creatinine Clearance: 141.5 mL/min (by C-G formula based on SCr of 1 mg/dL).       Results from last 7 days   Lab Units 24  0805 24  0601 02/10/24  0711 02/10/24  0311   CREATININE mg/dL 1.00  --  1.22 1.28*   WBC 10*3/mm3  --  6.09 6.50 6.08     Assessment/Plan    Current Vancomycin Dose: 1000 mg IV every  12  hours; now provides a predicted  mg/L.hr . AUC is now below desired range. Will adjust dose to 1250 mg IV q12h to provide new predicted  mg/L.hr  Next Level Date and Time: Vanc Trough on  at 1030  We will continue to monitor patient changes and renal function     Thank you for involving pharmacy in this patient's care. Please contact pharmacy with any questions or concerns.       Sunni Toussaint, [facility]  Clinical Pharmacist            "

## 2024-02-11 NOTE — PROGRESS NOTES
[facility] Medicine Services  PROGRESS NOTE    Patient Name: Richard Rodrigez  : 1980  MRN: [id number]    Date of Admission: 2/10/2024  Primary Care Physician: Jessie Almodovar III, YUKI-C    Subjective   Subjective     CC:  Follow up leg wound    S:  Patient feels much better today his leg pain is starting to improve.  He is feeling less generally weak.  He is awaiting MRI.  No other new complaints.    Review of Systems  No current fevers or chills  No current shortness of breath or cough  No current nausea, vomiting, or diarrhea  No current chest pain or palpitations       Objective   Objective     Vital Signs:   Temp:  [97.5 °F (36.4 °C)-98.1 °F (36.7 °C)] 97.5 °F (36.4 °C)  Heart Rate:  [74-91] 83  Resp:  [18-20] 20  BP: (109-150)/(64-95) 109/67        Physical Exam:  Constitutional: Awake, alert  Eyes: PERRLA, sclerae anicteric, no conjunctival injection  HENT: NCAT, mucous membranes moist  Neck: Supple, no thyromegaly, no lymphadenopathy, trachea midline  Respiratory: No cough or wheezes, normal inspiration, nonlabored respirations   Cardiovascular: Pulse rate is tachycardic initially but improved, palpable radial pulses bilaterally  Gastrointestinal: Morbidly obese, soft, nontender, nondistended  Musculoskeletal: Severely obese, BMI is 43, pitting bilateral lower extremity edema  Psychiatric: Appropriate affect, cooperative, conversational  Neurologic: No slurred speech or facial droop, follows commands  Skin: Chronic venous stasis changes noted, right ankle ulceration with resolving purulent drainage,  No rashes or jaundice, warm    Results Reviewed:  Results from last 7 days   Lab Units 24  0601 02/10/24  0711 02/10/24  0311   WBC 10*3/mm3 6.09 6.50 6.08   HEMOGLOBIN g/dL 16.7 16.5 16.8   HEMATOCRIT % 55.1* 55.4* 56.6*   PLATELETS 10*3/mm3 161 202 194   PROCALCITONIN ng/mL  --   --  0.37*     Results from last 7 days   Lab Units 24  0805 02/10/24  0711 02/10/24  0311    SODIUM mmol/L 141 139 138   POTASSIUM mmol/L 4.5 5.0 5.0   CHLORIDE mmol/L 95* 98 96*   CO2 mmol/L 36.7* 30.5* 32.8*   BUN mg/dL 27* 39* 40*   CREATININE mg/dL 1.00 1.22 1.28*   GLUCOSE mg/dL 86 76 65   CALCIUM mg/dL 8.4* 9.0 9.3   ALK PHOS U/L  --   --  142*  145*   ALT (SGPT) U/L  --   --  35  35   AST (SGOT) U/L  --   --  68*  68*   HSTROP T ng/L  --  76* 88*   PROBNP pg/mL  --   --  3,398.0*     Estimated Creatinine Clearance: 141.5 mL/min (by C-G formula based on SCr of 1 mg/dL).    Microbiology Results Abnormal       Procedure Component Value - Date/Time    Blood Culture - Blood, Arm, Left [[id number]]  (Normal) Collected: 02/10/24 0859    Lab Status: Preliminary result Specimen: Blood from Arm, Left Updated: 02/11/24 0915     Blood Culture No growth at 24 hours    Blood Culture - Blood, Arm, Right [[id number]]  (Normal) Collected: 02/10/24 0711    Lab Status: Preliminary result Specimen: Blood from Arm, Right Updated: 02/11/24 0730     Blood Culture No growth at 24 hours            Imaging Results (Last 24 Hours)       Procedure Component Value Units Date/Time    MRI Ankle Right With & Without Contrast [[id number]] Resulted: 02/11/24 1343     Updated: 02/11/24 1343            Results for orders placed during the hospital encounter of 12/07/21    Adult Transthoracic Echo Complete W/ Cont if Necessary Per Protocol    Interpretation Summary  · Left ventricular wall thickness is consistent with mild concentric hypertrophy.  · Estimated left ventricular EF = 57% Left ventricular systolic function is normal.  · Left ventricular diastolic function was indeterminate.  · Mildly reduced right ventricular systolic function noted.      I have reviewed the medications:  Scheduled Meds:cefTRIAXone, 2,000 mg, Intravenous, Q24H  furosemide, 40 mg, Intravenous, Q8H  gadobenate dimeglumine, 20 mL, Intravenous, Once in imaging  lisinopril, 40 mg, Oral, Daily  metoprolol tartrate, 50 mg, Oral, BID  sodium chloride, 10 mL,  Intravenous, Q12H  sodium hypochlorite, , Topical, Q12H  vancomycin, 1,250 mg, Intravenous, Q12H      Continuous Infusions:Pharmacy to dose vancomycin,       PRN Meds:.  acetaminophen **OR** acetaminophen **OR** acetaminophen    senna-docusate sodium **AND** polyethylene glycol **AND** bisacodyl **AND** bisacodyl    calcium carbonate    nitroglycerin    ondansetron ODT **OR** ondansetron    Pharmacy to dose vancomycin    [COMPLETED] Insert Peripheral IV **AND** sodium chloride    sodium chloride    sodium chloride    traMADol    Assessment & Plan   Assessment & Plan     Active Hospital Problems    Diagnosis  POA    **Anasarca [R60.1]  Yes    Wound infection [T14.8XXA, L08.9]  Yes    Bacterial conjunctivitis [H10.9]  Yes    Prediabetes [R73.03]  Yes    Diastolic CHF, acute [I50.31]  Yes    Abrasion of ankle with infection, right, initial encounter [S90.511A, L08.9]  Yes    Cellulitis of right ankle [L03.115]  Yes    Pulmonary hypertension [I27.20]  Yes    Suspected sleep apnea [R29.818]  Yes    Obesity hypoventilation syndrome [E66.2]  Yes    Morbid (severe) obesity [E66.01]  Yes    Tobacco use disorder [F17.200]  Yes    Essential hypertension [I10]  Yes      Resolved Hospital Problems   No resolved problems to display.        Brief Hospital Course to date:  Richard Rodrigez is a 44 y.o. male presents to the hospital with complicated right purulent recurrent ankle wound with acute diastolic CHF/anasarca.    Ankle wound:  Appreciate vascular surgery.  Further vascular workup.  MRI today.  I will review the images and follow-up on radiologist read.  Culture reviewed growing gram-negative organism.  Broad-spectrum coverage.  Awaiting further speciation and likely will de-escalate vancomycin.  Continue Tylenol and add tramadol for pain.    CHF/anasarca:  Renal function stable.  Repeat IV diuresis today.  Plan status improving.  Repeat echo.  proBNP is significantly elevated.  Troponin elevation with negative delta  seems to be due to CHF.  No chest pain, cardiac symptoms or signs of ACS.    Prediabetes: Heart healthy diet.  Recommend lifestyle changes.    Morbid obesity: Recommend lifestyle changes, improve diet and exercise and gradual healthy weight loss.    Obesity hypoventilation syndrome: Supplemental oxygen as needed to maintain greater than 89%.  Avoid hyperoxia.  Mild borderline hypoxia noted at admission.      Essential hypertension: Monitor blood pressure and adjust medications as needed.  Decrease lisinopril with diuresis.    Treatment plan discussed with the patient is in agreement.    DVT Prophylaxis: Mechanical        Disposition: I expect the patient to be discharged home pending improvement and clearance from some specialist.    CODE STATUS:   Code Status and Medical Interventions:   Ordered at: 02/10/24 0437     Code Status (Patient has no pulse and is not breathing):    CPR (Attempt to Resuscitate)     Medical Interventions (Patient has pulse or is breathing):    Full Support       Manolo Silvestre MD  02/11/24

## 2024-02-11 NOTE — PLAN OF CARE
Goal Outcome Evaluation:  Plan of Care Reviewed With: (P) patient      Pt A&O x 4, VSS, RA. Pt was unable to have MRI due to ulcer on side of right foot that burst. Both feet have been bandaged from toes to knees per vascular. Pt reports no pain. No other complaints this shift.   Progress: (P) improving

## 2024-02-11 NOTE — PROGRESS NOTES
"Nutrition Services    Patient Name:  Richard Rodrigez  YOB: 1980  MRN: [id number]  Admit Date:  2/10/2024    Assessment Date:  02/11/24    Summary: Diet education for low salt, pre diabetes, wt loss    44yoM admitted for bloody and infectious drainage from right ankle wound. Dx w/ severe anasarca. Hx of pulmonary HTN, obesity hypoventilation syndrome, hepatic steatosis, tobacco abuse, alcohol abuse. Abx started for right ankle wound. Noted 44# wt loss x 10 mo. Pt's BMI is 43.40. Pt not in the room when I visited. Will f/u for an interview as well as diet education and will add coco to promote wound healing.     REC:  Follow up for an interview and diet education  Coco BID mixed w/ lemonade  CTM PO intake    NUTRITION SCREENING      Reason for Encounter Admission assessment   Diagnosis/Problem Severe anasarca.       PO Diet Diet: Cardiac Diets; Healthy Heart (2-3 Na+); Texture: Regular Texture (IDDSI 7); Fluid Consistency: Thin (IDDSI 0)   Supplements    PO Intake % No intake avail       Medications MAR reviewed by RD   Labs  Listed below, reviewed   Physical Findings Edema +3 and +2 on UE, LE   GI Function Last BM 2/9   Skin Status Necrotic wound on right ankle       Height  Weight  BMI  Weight Trend     Height: 182.9 cm (72\")  Weight: (!) 145 kg (320 lb) (02/11/24 1150)  Body mass index is 43.4 kg/m².  Loss       Nutrition Problem (PES) Overweight / Obesity related to diet and lifestyle as evidenced by weight status and/or BMI.       Intervention/Plan Follow up for an interview about pt's wt loss.   Coco BID mixed w/ lemonade  CTM PO intake    RD to follow up per protocol.     Results from last 7 days   Lab Units 02/11/24  0805 02/10/24  0711 02/10/24  0311   SODIUM mmol/L 141 139 138   POTASSIUM mmol/L 4.5 5.0 5.0   CHLORIDE mmol/L 95* 98 96*   CO2 mmol/L 36.7* 30.5* 32.8*   BUN mg/dL 27* 39* 40*   CREATININE mg/dL 1.00 1.22 1.28*   CALCIUM mg/dL 8.4* 9.0 9.3   BILIRUBIN mg/dL  --   --  5.5* "  5.4*   ALK PHOS U/L  --   --  142*  145*   ALT (SGPT) U/L  --   --  35  35   AST (SGOT) U/L  --   --  68*  68*   GLUCOSE mg/dL 86 76 65     Results from last 7 days   Lab Units 02/11/24  0601 02/10/24  0711 02/10/24  0311   MAGNESIUM mg/dL  --   --  2.2   HEMOGLOBIN g/dL 16.7   < > 16.8   HEMATOCRIT % 55.1*   < > 56.6*    < > = values in this interval not displayed.     Lab Results   Component Value Date    HGBA1C 6.30 (H) 02/10/2024         Electronically signed by:  Shanthi Mcadams  02/11/24 15:16 EST

## 2024-02-11 NOTE — CONSULTS
Patient Name: Richard Rodrigez Account #: [id number]    MRN: [id number] Admission Date: 2/10/2024      Consulting Service: Vascular Surgery Date of Evaluation: 2024    Requesting Provider: Manolo Silvestre MD        [place]      CHIEF COMPLAINT: Right lateral leg wound  HPI: Richard Rodrigez is a 44 y.o. male is being seen for a consultation and evaluation/management of with what appears to be a large right lateral leg wound that is likely stasis in nature.  Patient has chronic swelling right worse on the left and this is a wound that reopened after some time.  He has some necrotic cortical debris tomorrow at bedside.  The rest looks healthy although I cannot palpate his pulses due to his swelling.  Will check ABIs and a lower extremity venous scan.  At some point he will need a mapping in the office.  Will begin by addressing him with Ace wrap's toes to knee and we will ask lymphedema therapy to see him tomorrow.    PAST MEDICAL HISTORY:   Past Medical History:   Diagnosis Date    Acute respiratory failure with hypoxia and hypercapnia 12/10/2021    Hypertension       PAST SURGICAL HISTORY:   Past Surgical History:   Procedure Laterality Date    FRACTURE SURGERY        FAMILY HISTORY:   Family History   Problem Relation Age of Onset    Stroke Mother     Hypertension Mother     No Known Problems Sister     No Known Problems Brother     No Known Problems Sister     No Known Problems Sister     No Known Problems Sister     Breast cancer Maternal Grandmother     Colon cancer Neg Hx     Prostate cancer Neg Hx       SOCIAL HISTORY:   Social History     Tobacco Use    Smoking status: Every Day     Types: Cigars    Smokeless tobacco: Never   Vaping Use    Vaping Use: Never used   Substance Use Topics    Alcohol use: Yes     Alcohol/week: 2.0 standard drinks of alcohol     Types: 2 Shots of liquor per week     Comment: occ    Drug use: Never      MEDICATIONS:   No current facility-administered  medications on file prior to encounter.     Current Outpatient Medications on File Prior to Encounter   Medication Sig Dispense Refill    furosemide (LASIX) 40 MG tablet Take 1 tablet by mouth 2 (Two) Times a Day. 180 tablet 3    lisinopril (PRINIVIL,ZESTRIL) 40 MG tablet Take 1 tablet by mouth Daily. 90 tablet 3    metoprolol tartrate (LOPRESSOR) 50 MG tablet Take 1 tablet by mouth 2 (Two) Times a Day. 180 tablet 3             ALLERGIES: Patient has no known allergies.   COMPLETE REVIEW OF SYSTEMS:     ENT ROS: negative  Cardiovascular ROS: no chest pain or dyspnea on exertion  Respiratory ROS: no cough, shortness of breath, or wheezing  Gastrointestinal ROS: no abdominal pain, change in bowel habits, or black or bloody stools  Neurological ROS: no TIA or stroke symptoms  Genito-Urinary ROS: no dysuria, trouble voiding, or hematuria  Musculoskeletal ROS: positive for -severe swelling right worse than left and lower extremities with  Dermatological ROS: negative  Psychological ROS: negative      PHYSICAL EXAM:   Patient Vitals for the past 24 hrs:   BP Temp Temp src Pulse Resp SpO2   02/11/24 0740 109/67 97.5 °F (36.4 °C) Oral 88 20 100 %   02/10/24 2300 111/64 98.1 °F (36.7 °C) Oral 75 20 93 %   02/10/24 2021 111/95 97.9 °F (36.6 °C) Oral 74 20 100 %   02/10/24 1355 150/80 97.7 °F (36.5 °C) Oral 91 18 --        General appearance: alert, well appearing, and in no distress.  Neurological exam reveals alert, oriented, normal speech, no focal findings or movement disorder noted.  ENT exam reveals - ENT exam normal, no neck nodes or sinus tenderness.  CVS exam: normal rate, regular rhythm, normal S1, S2, no murmurs, rubs, clicks or gallops.  Chest: clear to auscultation, no wheezes, rales or rhonchi, symmetric air entry.  Abdominal exam: soft, nontender, nondistended, no masses or organomegaly.  Examination of the feet reveals warm, good capillary refill.  Severe swelling in both lower extremities right worse than left  with large lateral ulcer with some necrotic core at the base.  Cellulitis in the lower leg.  Pulses difficult to palpate at the foot.  Palpable femorals.        LABS:      Results Review:       I reviewed the patient's new clinical results.  Results from last 7 days   Lab Units 02/11/24  0601 02/10/24  0711 02/10/24  0311   WBC 10*3/mm3 6.09 6.50 6.08   HEMOGLOBIN g/dL 16.7 16.5 16.8   PLATELETS 10*3/mm3 161 202 194     Results from last 7 days   Lab Units 02/11/24  0805 02/10/24  0711 02/10/24  0311   SODIUM mmol/L 141 139 138   POTASSIUM mmol/L 4.5 5.0 5.0   CHLORIDE mmol/L 95* 98 96*   CO2 mmol/L 36.7* 30.5* 32.8*   BUN mg/dL 27* 39* 40*   CREATININE mg/dL 1.00 1.22 1.28*   GLUCOSE mg/dL 86 76 65   Estimated Creatinine Clearance: 141.5 mL/min (by C-G formula based on SCr of 1 mg/dL).  Results from last 7 days   Lab Units 02/11/24  0805 02/10/24  0711 02/10/24  0311   CALCIUM mg/dL 8.4* 9.0 9.3   ALBUMIN g/dL  --   --  3.1*  3.0*   MAGNESIUM mg/dL  --   --  2.2           The following radiologic or non-invasive studies have been reviewed by me: Right ankle films    Active Hospital Problems    Diagnosis  POA    **Anasarca [R60.1]  Yes    Wound infection [T14.8XXA, L08.9]  Yes    Bacterial conjunctivitis [H10.9]  Yes    Prediabetes [R73.03]  Yes    Diastolic CHF, acute [I50.31]  Yes    Abrasion of ankle with infection, right, initial encounter [S90.511A, L08.9]  Yes    Cellulitis of right ankle [L03.115]  Yes    Pulmonary hypertension [I27.20]  Yes    Suspected sleep apnea [R29.818]  Yes    Obesity hypoventilation syndrome [E66.2]  Yes    Morbid (severe) obesity [E66.01]  Yes    Tobacco use disorder [F17.200]  Yes    Essential hypertension [I10]  Yes      Resolved Hospital Problems   No resolved problems to display.         ASSESSMENT/PLAN: 44 y.o. male with large recurrent stasis ulceration of the right lateral leg.  Will check ABIs as I cannot take palpate pulses and will bedside debridement tomorrow.  Will add  ace wraps to both legs and asked lymphedema therapy to evaluate tomorrow.  He will need outpatient workup for reflux but we will get a plain duplex today as well with his STEPH.  If he has severe superficial reflux he may be a candidate for ablation but will see as this lesion is on the lateral aspect of the leg.  Will follow with you.      I discussed the plan with the patient and he is agreeable to the plan of care at this point. Thank you for this consult.     Michele Anaya MD   02/11/24

## 2024-02-12 ENCOUNTER — APPOINTMENT (OUTPATIENT)
Dept: MRI IMAGING | Facility: HOSPITAL | Age: 44
End: 2024-02-12
Payer: COMMERCIAL

## 2024-02-12 ENCOUNTER — ANESTHESIA EVENT (OUTPATIENT)
Dept: PERIOP | Facility: HOSPITAL | Age: 44
End: 2024-02-12
Payer: COMMERCIAL

## 2024-02-12 ENCOUNTER — ANESTHESIA (OUTPATIENT)
Dept: PERIOP | Facility: HOSPITAL | Age: 44
End: 2024-02-12
Payer: COMMERCIAL

## 2024-02-12 PROBLEM — M19.071 PRIMARY OSTEOARTHRITIS OF RIGHT ANKLE: Status: ACTIVE | Noted: 2024-02-12

## 2024-02-12 PROBLEM — T14.8XXA OPEN WOUND: Status: ACTIVE | Noted: 2024-02-10

## 2024-02-12 PROBLEM — L97.319 VENOUS STASIS ULCER OF RIGHT ANKLE: Status: ACTIVE | Noted: 2024-02-12

## 2024-02-12 PROBLEM — I83.013 VENOUS STASIS ULCER OF RIGHT ANKLE: Status: ACTIVE | Noted: 2024-02-12

## 2024-02-12 LAB
ANION GAP SERPL CALCULATED.3IONS-SCNC: 8.4 MMOL/L (ref 5–15)
BUN SERPL-MCNC: 23 MG/DL (ref 6–20)
BUN/CREAT SERPL: 25 (ref 7–25)
CALCIUM SPEC-SCNC: 8.2 MG/DL (ref 8.6–10.5)
CHLORIDE SERPL-SCNC: 94 MMOL/L (ref 98–107)
CO2 SERPL-SCNC: 34.6 MMOL/L (ref 22–29)
CREAT SERPL-MCNC: 0.92 MG/DL (ref 0.76–1.27)
DEPRECATED RDW RBC AUTO: 54.1 FL (ref 37–54)
EGFRCR SERPLBLD CKD-EPI 2021: 105.8 ML/MIN/1.73
ERYTHROCYTE [DISTWIDTH] IN BLOOD BY AUTOMATED COUNT: 19.3 % (ref 12.3–15.4)
GLUCOSE SERPL-MCNC: 93 MG/DL (ref 65–99)
HCT VFR BLD AUTO: 54.4 % (ref 37.5–51)
HGB BLD-MCNC: 16.1 G/DL (ref 13–17.7)
MCH RBC QN AUTO: 25.2 PG (ref 26.6–33)
MCHC RBC AUTO-ENTMCNC: 29.6 G/DL (ref 31.5–35.7)
MCV RBC AUTO: 85.3 FL (ref 79–97)
PLATELET # BLD AUTO: 174 10*3/MM3 (ref 140–450)
PMV BLD AUTO: 10.3 FL (ref 6–12)
POTASSIUM SERPL-SCNC: 4.8 MMOL/L (ref 3.5–5.2)
RBC # BLD AUTO: 6.38 10*6/MM3 (ref 4.14–5.8)
SODIUM SERPL-SCNC: 137 MMOL/L (ref 136–145)
VANCOMYCIN TROUGH SERPL-MCNC: 13.3 MCG/ML (ref 5–20)
WBC NRBC COR # BLD AUTO: 5.33 10*3/MM3 (ref 3.4–10.8)

## 2024-02-12 PROCEDURE — 25010000002 ONDANSETRON PER 1 MG: Performed by: ANESTHESIOLOGY

## 2024-02-12 PROCEDURE — 25010000002 FENTANYL CITRATE (PF) 50 MCG/ML SOLUTION: Performed by: ANESTHESIOLOGY

## 2024-02-12 PROCEDURE — 85027 COMPLETE CBC AUTOMATED: CPT | Performed by: INTERNAL MEDICINE

## 2024-02-12 PROCEDURE — 87070 CULTURE OTHR SPECIMN AEROBIC: CPT | Performed by: SURGERY

## 2024-02-12 PROCEDURE — 25010000002 HYDROMORPHONE PER 4 MG: Performed by: ANESTHESIOLOGY

## 2024-02-12 PROCEDURE — 25010000002 CEFTRIAXONE PER 250 MG: Performed by: NURSE PRACTITIONER

## 2024-02-12 PROCEDURE — 80202 ASSAY OF VANCOMYCIN: CPT | Performed by: SURGERY

## 2024-02-12 PROCEDURE — 25010000002 FUROSEMIDE PER 20 MG: Performed by: INTERNAL MEDICINE

## 2024-02-12 PROCEDURE — 06BP0ZZ EXCISION OF RIGHT SAPHENOUS VEIN, OPEN APPROACH: ICD-10-PCS | Performed by: SURGERY

## 2024-02-12 PROCEDURE — 80048 BASIC METABOLIC PNL TOTAL CA: CPT | Performed by: INTERNAL MEDICINE

## 2024-02-12 PROCEDURE — 87077 CULTURE AEROBIC IDENTIFY: CPT | Performed by: SURGERY

## 2024-02-12 PROCEDURE — 0JBN0ZZ EXCISION OF RIGHT LOWER LEG SUBCUTANEOUS TISSUE AND FASCIA, OPEN APPROACH: ICD-10-PCS | Performed by: SURGERY

## 2024-02-12 PROCEDURE — 25010000002 SUCCINYLCHOLINE PER 20 MG: Performed by: ANESTHESIOLOGY

## 2024-02-12 PROCEDURE — 73723 MRI JOINT LWR EXTR W/O&W/DYE: CPT

## 2024-02-12 PROCEDURE — 25810000003 LACTATED RINGERS PER 1000 ML: Performed by: ANESTHESIOLOGY

## 2024-02-12 PROCEDURE — 25810000003 SODIUM CHLORIDE 0.9 % SOLUTION: Performed by: INTERNAL MEDICINE

## 2024-02-12 PROCEDURE — 25010000002 VANCOMYCIN 10 G RECONSTITUTED SOLUTION: Performed by: NURSE PRACTITIONER

## 2024-02-12 PROCEDURE — 0 GADOBENATE DIMEGLUMINE 529 MG/ML SOLUTION: Performed by: INTERNAL MEDICINE

## 2024-02-12 PROCEDURE — 25010000002 CEFAZOLIN PER 500 MG: Performed by: SURGERY

## 2024-02-12 PROCEDURE — 25010000002 PROPOFOL 10 MG/ML EMULSION: Performed by: ANESTHESIOLOGY

## 2024-02-12 PROCEDURE — 25010000002 PHENYLEPHRINE 10 MG/ML SOLUTION: Performed by: ANESTHESIOLOGY

## 2024-02-12 PROCEDURE — 87176 TISSUE HOMOGENIZATION CULTR: CPT | Performed by: SURGERY

## 2024-02-12 PROCEDURE — 25010000002 VANCOMYCIN 10 G RECONSTITUTED SOLUTION: Performed by: INTERNAL MEDICINE

## 2024-02-12 PROCEDURE — 25810000003 SODIUM CHLORIDE 0.9 % SOLUTION: Performed by: NURSE PRACTITIONER

## 2024-02-12 PROCEDURE — A9577 INJ MULTIHANCE: HCPCS | Performed by: INTERNAL MEDICINE

## 2024-02-12 PROCEDURE — 25010000002 DEXAMETHASONE SODIUM PHOSPHATE 20 MG/5ML SOLUTION: Performed by: ANESTHESIOLOGY

## 2024-02-12 PROCEDURE — 87205 SMEAR GRAM STAIN: CPT | Performed by: SURGERY

## 2024-02-12 PROCEDURE — 87075 CULTR BACTERIA EXCEPT BLOOD: CPT | Performed by: SURGERY

## 2024-02-12 DEVICE — IMPLANTABLE DEVICE: Type: IMPLANTABLE DEVICE | Site: LEG | Status: FUNCTIONAL

## 2024-02-12 RX ORDER — PROMETHAZINE HYDROCHLORIDE 25 MG/1
25 SUPPOSITORY RECTAL ONCE AS NEEDED
Status: DISCONTINUED | OUTPATIENT
Start: 2024-02-12 | End: 2024-02-12 | Stop reason: HOSPADM

## 2024-02-12 RX ORDER — IPRATROPIUM BROMIDE AND ALBUTEROL SULFATE 2.5; .5 MG/3ML; MG/3ML
3 SOLUTION RESPIRATORY (INHALATION) ONCE AS NEEDED
Status: DISCONTINUED | OUTPATIENT
Start: 2024-02-12 | End: 2024-02-12 | Stop reason: HOSPADM

## 2024-02-12 RX ORDER — ONDANSETRON 2 MG/ML
4 INJECTION INTRAMUSCULAR; INTRAVENOUS ONCE AS NEEDED
Status: DISCONTINUED | OUTPATIENT
Start: 2024-02-12 | End: 2024-02-12 | Stop reason: HOSPADM

## 2024-02-12 RX ORDER — SODIUM CHLORIDE 0.9 % (FLUSH) 0.9 %
3-10 SYRINGE (ML) INJECTION AS NEEDED
Status: DISCONTINUED | OUTPATIENT
Start: 2024-02-12 | End: 2024-02-12 | Stop reason: HOSPADM

## 2024-02-12 RX ORDER — SODIUM CHLORIDE 9 MG/ML
40 INJECTION, SOLUTION INTRAVENOUS AS NEEDED
Status: DISCONTINUED | OUTPATIENT
Start: 2024-02-12 | End: 2024-02-12 | Stop reason: HOSPADM

## 2024-02-12 RX ORDER — PROMETHAZINE HYDROCHLORIDE 25 MG/1
25 TABLET ORAL ONCE AS NEEDED
Status: DISCONTINUED | OUTPATIENT
Start: 2024-02-12 | End: 2024-02-12 | Stop reason: HOSPADM

## 2024-02-12 RX ORDER — LABETALOL HYDROCHLORIDE 5 MG/ML
5 INJECTION, SOLUTION INTRAVENOUS
Status: DISCONTINUED | OUTPATIENT
Start: 2024-02-12 | End: 2024-02-12 | Stop reason: HOSPADM

## 2024-02-12 RX ORDER — EPHEDRINE SULFATE 50 MG/ML
INJECTION, SOLUTION INTRAVENOUS AS NEEDED
Status: DISCONTINUED | OUTPATIENT
Start: 2024-02-12 | End: 2024-02-12 | Stop reason: SURG

## 2024-02-12 RX ORDER — HYDROMORPHONE HYDROCHLORIDE 1 MG/ML
0.25 INJECTION, SOLUTION INTRAMUSCULAR; INTRAVENOUS; SUBCUTANEOUS
Status: DISCONTINUED | OUTPATIENT
Start: 2024-02-12 | End: 2024-02-12 | Stop reason: HOSPADM

## 2024-02-12 RX ORDER — SUCCINYLCHOLINE CHLORIDE 20 MG/ML
INJECTION INTRAMUSCULAR; INTRAVENOUS AS NEEDED
Status: DISCONTINUED | OUTPATIENT
Start: 2024-02-12 | End: 2024-02-12 | Stop reason: SURG

## 2024-02-12 RX ORDER — NALOXONE HCL 0.4 MG/ML
0.2 VIAL (ML) INJECTION AS NEEDED
Status: DISCONTINUED | OUTPATIENT
Start: 2024-02-12 | End: 2024-02-12 | Stop reason: HOSPADM

## 2024-02-12 RX ORDER — HYDRALAZINE HYDROCHLORIDE 20 MG/ML
5 INJECTION INTRAMUSCULAR; INTRAVENOUS
Status: DISCONTINUED | OUTPATIENT
Start: 2024-02-12 | End: 2024-02-12 | Stop reason: HOSPADM

## 2024-02-12 RX ORDER — DROPERIDOL 2.5 MG/ML
0.62 INJECTION, SOLUTION INTRAMUSCULAR; INTRAVENOUS
Status: DISCONTINUED | OUTPATIENT
Start: 2024-02-12 | End: 2024-02-12 | Stop reason: HOSPADM

## 2024-02-12 RX ORDER — FENTANYL CITRATE 50 UG/ML
50 INJECTION, SOLUTION INTRAMUSCULAR; INTRAVENOUS
Status: DISCONTINUED | OUTPATIENT
Start: 2024-02-12 | End: 2024-02-12 | Stop reason: HOSPADM

## 2024-02-12 RX ORDER — FUROSEMIDE 10 MG/ML
40 INJECTION INTRAMUSCULAR; INTRAVENOUS ONCE
Status: COMPLETED | OUTPATIENT
Start: 2024-02-12 | End: 2024-02-12

## 2024-02-12 RX ORDER — HYDROCODONE BITARTRATE AND ACETAMINOPHEN 5; 325 MG/1; MG/1
1 TABLET ORAL ONCE AS NEEDED
Status: DISCONTINUED | OUTPATIENT
Start: 2024-02-12 | End: 2024-02-12 | Stop reason: HOSPADM

## 2024-02-12 RX ORDER — LIDOCAINE HYDROCHLORIDE 20 MG/ML
INJECTION, SOLUTION INFILTRATION; PERINEURAL AS NEEDED
Status: DISCONTINUED | OUTPATIENT
Start: 2024-02-12 | End: 2024-02-12 | Stop reason: SURG

## 2024-02-12 RX ORDER — DEXAMETHASONE SODIUM PHOSPHATE 4 MG/ML
INJECTION, SOLUTION INTRA-ARTICULAR; INTRALESIONAL; INTRAMUSCULAR; INTRAVENOUS; SOFT TISSUE AS NEEDED
Status: DISCONTINUED | OUTPATIENT
Start: 2024-02-12 | End: 2024-02-12 | Stop reason: SURG

## 2024-02-12 RX ORDER — KETAMINE HCL IN NACL, ISO-OSM 100MG/10ML
SYRINGE (ML) INJECTION AS NEEDED
Status: DISCONTINUED | OUTPATIENT
Start: 2024-02-12 | End: 2024-02-12 | Stop reason: SURG

## 2024-02-12 RX ORDER — PROPOFOL 10 MG/ML
VIAL (ML) INTRAVENOUS AS NEEDED
Status: DISCONTINUED | OUTPATIENT
Start: 2024-02-12 | End: 2024-02-12 | Stop reason: SURG

## 2024-02-12 RX ORDER — MIDAZOLAM HYDROCHLORIDE 1 MG/ML
1 INJECTION INTRAMUSCULAR; INTRAVENOUS
Status: DISCONTINUED | OUTPATIENT
Start: 2024-02-12 | End: 2024-02-12 | Stop reason: HOSPADM

## 2024-02-12 RX ORDER — SODIUM CHLORIDE, SODIUM LACTATE, POTASSIUM CHLORIDE, CALCIUM CHLORIDE 600; 310; 30; 20 MG/100ML; MG/100ML; MG/100ML; MG/100ML
INJECTION, SOLUTION INTRAVENOUS CONTINUOUS PRN
Status: DISCONTINUED | OUTPATIENT
Start: 2024-02-12 | End: 2024-02-12 | Stop reason: SURG

## 2024-02-12 RX ORDER — CEFAZOLIN SODIUM 2 G/100ML
2 INJECTION, SOLUTION INTRAVENOUS ONCE
Status: DISCONTINUED | OUTPATIENT
Start: 2024-02-12 | End: 2024-02-12 | Stop reason: DRUGHIGH

## 2024-02-12 RX ORDER — ACETAMINOPHEN 500 MG
1000 TABLET ORAL ONCE
Status: COMPLETED | OUTPATIENT
Start: 2024-02-12 | End: 2024-02-12

## 2024-02-12 RX ORDER — SODIUM CHLORIDE 0.9 % (FLUSH) 0.9 %
3 SYRINGE (ML) INJECTION EVERY 12 HOURS SCHEDULED
Status: DISCONTINUED | OUTPATIENT
Start: 2024-02-12 | End: 2024-02-12 | Stop reason: HOSPADM

## 2024-02-12 RX ORDER — CEFAZOLIN SODIUM IN 0.9 % NACL 3 G/100 ML
3000 INTRAVENOUS SOLUTION, PIGGYBACK (ML) INTRAVENOUS ONCE
Status: DISCONTINUED | OUTPATIENT
Start: 2024-02-12 | End: 2024-02-12

## 2024-02-12 RX ORDER — SODIUM CHLORIDE, SODIUM LACTATE, POTASSIUM CHLORIDE, CALCIUM CHLORIDE 600; 310; 30; 20 MG/100ML; MG/100ML; MG/100ML; MG/100ML
9 INJECTION, SOLUTION INTRAVENOUS CONTINUOUS
Status: DISCONTINUED | OUTPATIENT
Start: 2024-02-12 | End: 2024-02-14 | Stop reason: HOSPADM

## 2024-02-12 RX ORDER — FENTANYL CITRATE 50 UG/ML
25 INJECTION, SOLUTION INTRAMUSCULAR; INTRAVENOUS
Status: DISCONTINUED | OUTPATIENT
Start: 2024-02-12 | End: 2024-02-12 | Stop reason: HOSPADM

## 2024-02-12 RX ORDER — PHENYLEPHRINE HYDROCHLORIDE 10 MG/ML
INJECTION INTRAVENOUS AS NEEDED
Status: DISCONTINUED | OUTPATIENT
Start: 2024-02-12 | End: 2024-02-12 | Stop reason: SURG

## 2024-02-12 RX ORDER — OXYCODONE AND ACETAMINOPHEN 7.5; 325 MG/1; MG/1
1 TABLET ORAL EVERY 4 HOURS PRN
Status: DISCONTINUED | OUTPATIENT
Start: 2024-02-12 | End: 2024-02-12 | Stop reason: HOSPADM

## 2024-02-12 RX ORDER — CELECOXIB 100 MG/1
100 CAPSULE ORAL ONCE
Status: COMPLETED | OUTPATIENT
Start: 2024-02-12 | End: 2024-02-12

## 2024-02-12 RX ORDER — EPHEDRINE SULFATE 50 MG/ML
5 INJECTION, SOLUTION INTRAVENOUS ONCE AS NEEDED
Status: DISCONTINUED | OUTPATIENT
Start: 2024-02-12 | End: 2024-02-12 | Stop reason: HOSPADM

## 2024-02-12 RX ORDER — FAMOTIDINE 10 MG/ML
20 INJECTION, SOLUTION INTRAVENOUS ONCE
Status: COMPLETED | OUTPATIENT
Start: 2024-02-12 | End: 2024-02-12

## 2024-02-12 RX ORDER — ALBUTEROL SULFATE 90 UG/1
AEROSOL, METERED RESPIRATORY (INHALATION) AS NEEDED
Status: DISCONTINUED | OUTPATIENT
Start: 2024-02-12 | End: 2024-02-12 | Stop reason: SURG

## 2024-02-12 RX ORDER — ONDANSETRON 2 MG/ML
INJECTION INTRAMUSCULAR; INTRAVENOUS AS NEEDED
Status: DISCONTINUED | OUTPATIENT
Start: 2024-02-12 | End: 2024-02-12 | Stop reason: SURG

## 2024-02-12 RX ORDER — FLUMAZENIL 0.1 MG/ML
0.2 INJECTION INTRAVENOUS AS NEEDED
Status: DISCONTINUED | OUTPATIENT
Start: 2024-02-12 | End: 2024-02-12 | Stop reason: HOSPADM

## 2024-02-12 RX ADMIN — FUROSEMIDE 40 MG: 10 INJECTION, SOLUTION INTRAMUSCULAR; INTRAVENOUS at 14:19

## 2024-02-12 RX ADMIN — Medication 40 MG: at 10:24

## 2024-02-12 RX ADMIN — PROPOFOL 50 MG: 10 INJECTION, EMULSION INTRAVENOUS at 10:42

## 2024-02-12 RX ADMIN — CELECOXIB 100 MG: 100 CAPSULE ORAL at 09:50

## 2024-02-12 RX ADMIN — ACETAMINOPHEN 1000 MG: 500 TABLET ORAL at 09:50

## 2024-02-12 RX ADMIN — SUCCINYLCHOLINE CHLORIDE 160 MG: 20 INJECTION, SOLUTION INTRAMUSCULAR; INTRAVENOUS; PARENTERAL at 10:09

## 2024-02-12 RX ADMIN — ONDANSETRON 4 MG: 2 INJECTION INTRAMUSCULAR; INTRAVENOUS at 10:49

## 2024-02-12 RX ADMIN — HYDROMORPHONE HYDROCHLORIDE 0.25 MG: 1 INJECTION, SOLUTION INTRAMUSCULAR; INTRAVENOUS; SUBCUTANEOUS at 11:45

## 2024-02-12 RX ADMIN — Medication 10 ML: at 21:36

## 2024-02-12 RX ADMIN — GADOBENATE DIMEGLUMINE 20 ML: 529 INJECTION, SOLUTION INTRAVENOUS at 15:47

## 2024-02-12 RX ADMIN — DEXAMETHASONE SODIUM PHOSPHATE 8 MG: 4 INJECTION, SOLUTION INTRAMUSCULAR; INTRAVENOUS at 10:19

## 2024-02-12 RX ADMIN — CEFTRIAXONE 2000 MG: 2 INJECTION, POWDER, FOR SOLUTION INTRAMUSCULAR; INTRAVENOUS at 05:21

## 2024-02-12 RX ADMIN — LISINOPRIL 10 MG: 10 TABLET ORAL at 08:24

## 2024-02-12 RX ADMIN — EPHEDRINE SULFATE 10 MG: 50 INJECTION INTRAVENOUS at 10:23

## 2024-02-12 RX ADMIN — VANCOMYCIN HYDROCHLORIDE 1250 MG: 10 INJECTION, POWDER, LYOPHILIZED, FOR SOLUTION INTRAVENOUS at 17:04

## 2024-02-12 RX ADMIN — ALBUTEROL SULFATE 2 PUFF: 90 AEROSOL, METERED RESPIRATORY (INHALATION) at 10:42

## 2024-02-12 RX ADMIN — TRAMADOL HYDROCHLORIDE 50 MG: 50 TABLET ORAL at 17:06

## 2024-02-12 RX ADMIN — SODIUM CHLORIDE, POTASSIUM CHLORIDE, SODIUM LACTATE AND CALCIUM CHLORIDE: 600; 310; 30; 20 INJECTION, SOLUTION INTRAVENOUS at 10:01

## 2024-02-12 RX ADMIN — FENTANYL CITRATE 25 MCG: 50 INJECTION, SOLUTION INTRAMUSCULAR; INTRAVENOUS at 11:44

## 2024-02-12 RX ADMIN — SODIUM CHLORIDE, POTASSIUM CHLORIDE, SODIUM LACTATE AND CALCIUM CHLORIDE 9 ML/HR: 600; 310; 30; 20 INJECTION, SOLUTION INTRAVENOUS at 09:50

## 2024-02-12 RX ADMIN — PHENYLEPHRINE HYDROCHLORIDE 200 MCG: 10 INJECTION INTRAVENOUS at 10:09

## 2024-02-12 RX ADMIN — LIDOCAINE HYDROCHLORIDE 100 MG: 20 INJECTION, SOLUTION INFILTRATION; PERINEURAL at 10:09

## 2024-02-12 RX ADMIN — FAMOTIDINE 20 MG: 10 INJECTION INTRAVENOUS at 09:50

## 2024-02-12 RX ADMIN — EPHEDRINE SULFATE 10 MG: 50 INJECTION INTRAVENOUS at 10:34

## 2024-02-12 RX ADMIN — ALBUTEROL SULFATE 2 PUFF: 90 AEROSOL, METERED RESPIRATORY (INHALATION) at 11:03

## 2024-02-12 RX ADMIN — EPHEDRINE SULFATE 10 MG: 50 INJECTION INTRAVENOUS at 10:46

## 2024-02-12 RX ADMIN — ANTACID TABLETS 2 TABLET: 500 TABLET, CHEWABLE ORAL at 00:51

## 2024-02-12 RX ADMIN — TRAMADOL HYDROCHLORIDE 50 MG: 50 TABLET ORAL at 07:32

## 2024-02-12 RX ADMIN — VANCOMYCIN HYDROCHLORIDE 1250 MG: 10 INJECTION, POWDER, LYOPHILIZED, FOR SOLUTION INTRAVENOUS at 04:20

## 2024-02-12 RX ADMIN — METOPROLOL TARTRATE 50 MG: 50 TABLET, FILM COATED ORAL at 08:24

## 2024-02-12 RX ADMIN — FENTANYL CITRATE 50 MCG: 50 INJECTION, SOLUTION INTRAMUSCULAR; INTRAVENOUS at 09:50

## 2024-02-12 RX ADMIN — PROPOFOL 150 MG: 10 INJECTION, EMULSION INTRAVENOUS at 10:09

## 2024-02-12 NOTE — NURSING NOTE
CWON note: Pt consult received for right ankle wound, Vascular is managing this wound and has taken pt to the OR today. Please re-consult for any additional needs.

## 2024-02-12 NOTE — PROGRESS NOTES
Name: Richard Rodrigez ADMIT: 2/10/2024   : 1980  PCP: Jessie Almodovar III, NP-C    MRN: [id number] LOS: 2 days   AGE/SEX: 44 y.o. male  ROOM: [address]    [place], Subsequent Hospital Care    Chief Complaint   Patient presents with    Wound Check    Groin Swelling     CC: Acute right venous hemorrhage  Subjective     44 y.o. male with right leg capillary hemorrhage with squirting blood after dressing taken down.  Patient reports that every time he stood in the last 24 to 48 hours he soaked his dressing with blood.  The site is clearly a venous source that has been unroofed by the ulcer near it.  At the 12:00 within the 7 x 7 cm ulcerated area.  We were able to control it with direct pressure and agree he is seeing it but he is not able to stand and the pressure from the [place] will be difficult to tolerate.  I am going to make him an emergency debridement and directed phlebectomy with ligation of bleeding site.  He will need to transfer to 5 E. postoperatively.    Review of Systems planes of pain in the leg    Objective     Scheduled Medications:   cefTRIAXone, 2,000 mg, Intravenous, Q24H  lisinopril, 10 mg, Oral, Daily  metoprolol tartrate, 50 mg, Oral, BID  sodium chloride, 10 mL, Intravenous, Q12H  sodium hypochlorite, , Topical, Q12H  vancomycin, 1,250 mg, Intravenous, Q12H        Active Infusions:  Pharmacy to dose vancomycin,         As Needed Medications:    acetaminophen **OR** acetaminophen **OR** acetaminophen    senna-docusate sodium **AND** polyethylene glycol **AND** bisacodyl **AND** bisacodyl    calcium carbonate    nitroglycerin    ondansetron ODT **OR** ondansetron    Pharmacy to dose vancomycin    [COMPLETED] Insert Peripheral IV **AND** sodium chloride    sodium chloride    sodium chloride    traMADol    Vital Signs  Vital Signs Patient Vitals for the past 24 hrs:   BP Temp Temp src Pulse Resp SpO2 Height Weight   24 2350 111/63 97.7 °F (36.5 °C)  "Oral 73 18 92 % -- --   02/11/24 1929 118/66 97.8 °F (36.6 °C) Oral 78 18 94 % -- --   02/11/24 1545 129/77 97.7 °F (36.5 °C) Oral 91 18 -- -- --   02/11/24 1150 109/67 -- -- 83 -- -- 182.9 cm (72\") (!) 145 kg (320 lb)   02/11/24 0740 109/67 97.5 °F (36.4 °C) Oral 88 20 100 % -- --     Vital Signs (range)  Temp:  [97.5 °F (36.4 °C)-97.8 °F (36.6 °C)] 97.7 °F (36.5 °C)  Heart Rate:  [73-91] 73  Resp:  [18-20] 18  BP: (109-129)/(63-77) 111/63  I/O:  I/O last 3 completed shifts:  In: 462 [P.O.:462]  Out: 7325 [Urine:7325]  I/O:   Intake/Output Summary (Last 24 hours) at 2/12/2024 0711  Last data filed at 2/11/2024 2350  Gross per 24 hour   Intake 462 ml   Output 5000 ml   Net -4538 ml     BMI:  Body mass index is 43.4 kg/m².    Physical Exam:  Physical Exam   Lungs coarse  Extremities severely swollen  Abdomen obese but benign  Right leg wound with nonpulsatile continuous bleeding from the site of the venous hemorrhage/capillary source.  Site of the bleed is at 12:00 within the wound  Results Review:     CBC    Results from last 7 days   Lab Units 02/12/24 0428 02/11/24  0601 02/10/24  0711 02/10/24  0311   WBC 10*3/mm3 5.33 6.09 6.50 6.08   HEMOGLOBIN g/dL 16.1 16.7 16.5 16.8   PLATELETS 10*3/mm3 174 161 202 194     BMP   Results from last 7 days   Lab Units 02/12/24  0428 02/11/24  0805 02/10/24  0711 02/10/24  0311   SODIUM mmol/L 137 141 139 138   POTASSIUM mmol/L 4.8 4.5 5.0 5.0   CHLORIDE mmol/L 94* 95* 98 96*   CO2 mmol/L 34.6* 36.7* 30.5* 32.8*   BUN mg/dL 23* 27* 39* 40*   CREATININE mg/dL 0.92 1.00 1.22 1.28*   GLUCOSE mg/dL 93 86 76 65   MAGNESIUM mg/dL  --   --   --  2.2     Cr Clearance Estimated Creatinine Clearance: 153.8 mL/min (by C-G formula based on SCr of 0.92 mg/dL).  Coag     HbA1C   Lab Results   Component Value Date    HGBA1C 6.30 (H) 02/10/2024    HGBA1C 6.40 (H) 12/07/2021     Blood Glucose No results found for: \"POCGLU\"  Infection   Results from last 7 days   Lab Units 02/10/24  0859 " 02/10/24  0711 02/10/24  0314 02/10/24  0311   BLOODCX  No growth at 24 hours No growth at 24 hours  --   --    WOUNDCX   --   --  Light growth (2+) Gram Negative Bacilli*  Moderate growth (3+) Normal Skin Vanessa  --    PROCALCITONIN ng/mL  --   --   --  0.37*     CMP   Results from last 7 days   Lab Units 02/12/24  0428 02/11/24  0805 02/10/24  0711 02/10/24  0311   SODIUM mmol/L 137 141 139 138   POTASSIUM mmol/L 4.8 4.5 5.0 5.0   CHLORIDE mmol/L 94* 95* 98 96*   CO2 mmol/L 34.6* 36.7* 30.5* 32.8*   BUN mg/dL 23* 27* 39* 40*   CREATININE mg/dL 0.92 1.00 1.22 1.28*   GLUCOSE mg/dL 93 86 76 65   ALBUMIN g/dL  --   --   --  3.1*  3.0*   BILIRUBIN mg/dL  --   --   --  5.5*  5.4*   ALK PHOS U/L  --   --   --  142*  145*   AST (SGOT) U/L  --   --   --  68*  68*   ALT (SGPT) U/L  --   --   --  35  35     Radiology(recent) No radiology results for the last day    Assessment & Plan     Assessment & Plan      Anasarca    Morbid (severe) obesity    Tobacco use disorder    Essential hypertension    Suspected sleep apnea    Obesity hypoventilation syndrome    Pulmonary hypertension    Wound infection    Bacterial conjunctivitis    Prediabetes    Diastolic CHF, acute    Abrasion of ankle with infection, right, initial encounter    Cellulitis of right ankle      44 y.o. male with likely significant venous hypertension in his leg.  Noninvasive testing here is really on the helpful as there is no reflux study done.  He cannot stand or even sit because of the venous bleeding in his leg and now it is acutely bled during our wound change.  He will need operative debridement phlebectomy and ligation of this area.  We will declare this in urgent to emergent situation and involve our own cases as needed.      Michele Anaya MD  02/12/24  07:10 EST    Please call my office with any question: [phone]    Active Hospital Problems    Diagnosis  POA    **Anasarca [R60.1]  Yes    Wound infection [T14.8XXA, L08.9]  Yes     Bacterial conjunctivitis [H10.9]  Yes    Prediabetes [R73.03]  Yes    Diastolic CHF, acute [I50.31]  Yes    Abrasion of ankle with infection, right, initial encounter [S90.511A, L08.9]  Yes    Cellulitis of right ankle [L03.115]  Yes    Pulmonary hypertension [I27.20]  Yes    Suspected sleep apnea [R29.818]  Yes    Obesity hypoventilation syndrome [E66.2]  Yes    Morbid (severe) obesity [E66.01]  Yes    Tobacco use disorder [F17.200]  Yes    Essential hypertension [I10]  Yes      Resolved Hospital Problems   No resolved problems to display.

## 2024-02-12 NOTE — PLAN OF CARE
Goal Outcome Evaluation:  Plan of Care Reviewed With: patient        Progress: no change     Pt AO x 4. VSS. MRI still pending.All needs met. WCTM

## 2024-02-12 NOTE — PROGRESS NOTES
"[facility] Clinical Pharmacy Services: Vancomycin Monitoring Note    Richard Rodrigez is a 44 y.o. male who is on day 3 of pharmacy to dose vancomycin for Skin and Soft Tissue infection (RLE open wound).    Previous Vancomycin dosin mg IV q12h    Updated Cultures and Sensitivities:    BC No growth x 2 days   WC   Wound Culture Light growth (2+) Gram Negative Bacilli Abnormal      Results from last 7 days   Lab Units 24  1622   VANCOMYCIN TR mcg/mL 13.30     Vitals/Labs  Ht: 182.9 cm (72\"); Wt: (!) 145 kg (320 lb)   Temp Readings from Last 1 Encounters:   24 97.4 °F (36.3 °C) (Oral)     Estimated Creatinine Clearance: 153.8 mL/min (by C-G formula based on SCr of 0.92 mg/dL).       Results from last 7 days   Lab Units 24  0428 24  0805 24  0601 02/10/24  0711   CREATININE mg/dL 0.92 1.00  --  1.22   WBC 10*3/mm3 5.33  --  6.09 6.50     Assessment/Plan    Current Vancomycin Dose: continuing 1250 mg IV q12h-provides a predicted  mg/L.hr  Next Level Date and Time: no further levels indicated at this time  We will continue to monitor patient changes and renal function     Thank you for involving pharmacy in this patient's care. Please contact pharmacy with any questions or concerns.       Torie Barone [facility]  Clinical Pharmacist                "

## 2024-02-12 NOTE — PROGRESS NOTES
[facility] Medicine Services  PROGRESS NOTE    Patient Name: Richard Rodrigez  : 1980  MRN: [id number]    Date of Admission: 2/10/2024  Primary Care Physician: Jessie Almodovar III, MENDOZAC    Subjective   Subjective     CC:  Follow up leg wound    S:  Patient developed severe bleeding from his ulcer.  He was taken to the operating room today and received debridement of the ulcer.    Review of Systems  No current fevers or chills  No current shortness of breath or cough  No current nausea, vomiting, or diarrhea  No current chest pain or palpitations       Objective   Objective     Vital Signs:   Temp:  [94.7 °F (34.8 °C)-98.1 °F (36.7 °C)] 94.7 °F (34.8 °C)  Heart Rate:  [58-91] 59  Resp:  [16-18] 16  BP: (102-137)/(48-77) 111/65        Physical Exam:  Constitutional: Awake, alert  Eyes: PERRLA, sclerae anicteric, no conjunctival injection  HENT: NCAT, mucous membranes moist  Neck: Supple, no thyromegaly, no lymphadenopathy, trachea midline  Respiratory: No cough or wheezes, normal inspiration, nonlabored respirations   Cardiovascular: Pulse rate is tachycardic initially but improved, palpable radial pulses bilaterally  Gastrointestinal: Morbidly obese, soft, nontender, nondistended  Musculoskeletal: Severely obese, BMI is 43, pitting bilateral lower extremity edema  Psychiatric: Appropriate affect, cooperative, conversational  Neurologic: No slurred speech or facial droop, follows commands  Skin: Chronic venous stasis changes noted, right ankle ulceration with some surrounding erythema,  No rashes or jaundice, warm    Results Reviewed:  Results from last 7 days   Lab Units 24  0601 02/10/24  0711 02/10/24  0311   WBC 10*3/mm3 5.33 6.09 6.50 6.08   HEMOGLOBIN g/dL 16.1 16.7 16.5 16.8   HEMATOCRIT % 54.4* 55.1* 55.4* 56.6*   PLATELETS 10*3/mm3 174 161 202 194   PROCALCITONIN ng/mL  --   --   --  0.37*     Results from last 7 days   Lab Units 24  0805  02/10/24  0711 02/10/24  0311   SODIUM mmol/L 137 141 139 138   POTASSIUM mmol/L 4.8 4.5 5.0 5.0   CHLORIDE mmol/L 94* 95* 98 96*   CO2 mmol/L 34.6* 36.7* 30.5* 32.8*   BUN mg/dL 23* 27* 39* 40*   CREATININE mg/dL 0.92 1.00 1.22 1.28*   GLUCOSE mg/dL 93 86 76 65   CALCIUM mg/dL 8.2* 8.4* 9.0 9.3   ALK PHOS U/L  --   --   --  142*  145*   ALT (SGPT) U/L  --   --   --  35  35   AST (SGOT) U/L  --   --   --  68*  68*   HSTROP T ng/L  --   --  76* 88*   PROBNP pg/mL  --   --   --  3,398.0*     Estimated Creatinine Clearance: 153.8 mL/min (by C-G formula based on SCr of 0.92 mg/dL).    Microbiology Results Abnormal       Procedure Component Value - Date/Time    Blood Culture - Blood, Arm, Left [[id number]]  (Normal) Collected: 02/10/24 0859    Lab Status: Preliminary result Specimen: Blood from Arm, Left Updated: 02/12/24 0915     Blood Culture No growth at 2 days    Blood Culture - Blood, Arm, Right [[id number]]  (Normal) Collected: 02/10/24 0711    Lab Status: Preliminary result Specimen: Blood from Arm, Right Updated: 02/12/24 0730     Blood Culture No growth at 2 days            Imaging Results (Last 24 Hours)       Procedure Component Value Units Date/Time    MRI Ankle Right With & Without Contrast [[id number]] Resulted: 02/11/24 1343     Updated: 02/12/24 0920            Results for orders placed during the hospital encounter of 02/10/24    Adult Transthoracic Echo Complete W/ Cont if Necessary Per Protocol    Interpretation Summary    Systolic and diastolic flattening of the interventricular septum consistent with right ventricle pressure and volume overload    The right ventricular cavity is severely dilated. Severely reduced right ventricular systolic function noted.    Left ventricular systolic function is normal. Left ventricular ejection fraction appears to be 61 - 65%.    Left ventricular wall thickness is consistent with borderline concentric hypertrophy.    Left ventricular diastolic function was  indeterminate.    Saline test results are negative.    The right atrial cavity is moderately dilated.    Mild tricuspid valve regurgitation is present    Calculated right ventricular systolic pressure from tricuspid regurgitation is 47 mmHg.    There is no evidence of pericardial effusion.      I have reviewed the medications:  Scheduled Meds:cefTRIAXone, 2,000 mg, Intravenous, Q24H  lisinopril, 10 mg, Oral, Daily  metoprolol tartrate, 50 mg, Oral, BID  sodium chloride, 10 mL, Intravenous, Q12H      Continuous Infusions:lactated ringers, 9 mL/hr, Last Rate: 9 mL/hr (02/12/24 0950)      PRN Meds:.  acetaminophen **OR** acetaminophen **OR** acetaminophen    senna-docusate sodium **AND** polyethylene glycol **AND** bisacodyl **AND** bisacodyl    calcium carbonate    nitroglycerin    ondansetron ODT **OR** ondansetron    [COMPLETED] Insert Peripheral IV **AND** sodium chloride    sodium chloride    traMADol    Assessment & Plan   Assessment & Plan     Active Hospital Problems    Diagnosis  POA    **Anasarca [R60.1]  Yes    Venous stasis ulcer of right ankle [I83.013, L97.319]  Yes    Wound infection [T14.8XXA, L08.9]  Yes    Bacterial conjunctivitis [H10.9]  Yes    Prediabetes [R73.03]  Yes    Diastolic CHF, acute [I50.31]  Yes    Abrasion of ankle with infection, right, initial encounter [S90.511A, L08.9]  Yes    Cellulitis of right ankle [L03.115]  Yes    Open wound [T14.8XXA]  Yes    Pulmonary hypertension [I27.20]  Yes    Suspected sleep apnea [R29.818]  Yes    Obesity hypoventilation syndrome [E66.2]  Yes    Morbid (severe) obesity [E66.01]  Yes    Tobacco use disorder [F17.200]  Yes    Essential hypertension [I10]  Yes      Resolved Hospital Problems   No resolved problems to display.        Brief Hospital Course to date:  Richard Rodrigez is a 44 y.o. male presents to the hospital with complicated right purulent recurrent ankle wound with acute diastolic CHF/anasarca.    Discussion/plan for today:  Case  discussed with microbiology lab.  Initial wound culture was showing some gram-positive and gram-negative as well.  They are currently working at the gram negative but apparently could not workup the gram-positive organisms which looks perhaps like Streptococcus.  Fortunately surgical cultures were sent today from the surgical intervention hopefully this will give us more information since the original wound culture was polymicrobial.  Continue vancomycin and ceftriaxone for today.  Vancomycin discussed with pharmacist who is adjusting per monitoring levels, we are going to check vancomycin level today..  One-time IV Lasix today.    Ankle wound:  Appreciate vascular surgery.  Further vascular workup.  MRI when possible.  Status post surgical debridement 2/12/2024    CHF/anasarca:  Renal function stable. Plan status improving.  Repeat echo.  proBNP is significantly elevated.  Troponin elevation with negative delta seems to be due to CHF.  No chest pain, cardiac symptoms or signs of ACS.    Prediabetes: Heart healthy diet.  Recommend lifestyle changes.    Morbid obesity: Recommend lifestyle changes, improve diet and exercise and gradual healthy weight loss.    Obesity hypoventilation syndrome: Supplemental oxygen as needed to maintain greater than 89%.  Avoid hyperoxia.  Mild borderline hypoxia noted at admission.    Essential hypertension: Monitor blood pressure and adjust medications as needed.  Decrease lisinopril with diuresis.    Treatment plan discussed with the patient is in agreement.    DVT Prophylaxis: Mechanical        Disposition: I expect the patient to be discharged home pending improvement and clearance from some specialist.    CODE STATUS:   Code Status and Medical Interventions:   Ordered at: 02/10/24 0437     Code Status (Patient has no pulse and is not breathing):    CPR (Attempt to Resuscitate)     Medical Interventions (Patient has pulse or is breathing):    Full Support       Manolo Bautista  MD Konrad  02/12/24

## 2024-02-12 NOTE — PROGRESS NOTES
"[facility] Clinical Pharmacy Services: Vancomycin Monitoring Note    Richard Rodrigez is a 44 y.o. male who is on day 3 of stop date now of  of pharmacy to dose vancomycin for Skin and Soft Tissue infection (RLE open wound).    Previous Vancomycin dosin mg x1 at 1128 2/10/24 (loading dose given late per MD request due to difficulty drawing blood cultures).     Updated Cultures and Sensitivities:    BC No growth x 2 days   WC   Wound Culture Light growth (2+) Gram Negative Bacilli Abnormal          Vitals/Labs  Ht: 182.9 cm (72\"); Wt: (!) 145 kg (320 lb)   Temp Readings from Last 1 Encounters:   24 94.7 °F (34.8 °C) (Oral)     Estimated Creatinine Clearance: 153.8 mL/min (by C-G formula based on SCr of 0.92 mg/dL).       Results from last 7 days   Lab Units 24  0428 24  0805 24  0601 02/10/24  0711   CREATININE mg/dL 0.92 1.00  --  1.22   WBC 10*3/mm3 5.33  --  6.09 6.50     Assessment/Plan    Current Vancomycin Dose: 1250 mg IV q12h-provides a predicted  mg/L.hr  Next Level Date and Time: Vanc Trough on  at 1530 (2nd shift clinical to evaluate)  We will continue to monitor patient changes and renal function     Thank you for involving pharmacy in this patient's care. Please contact pharmacy with any questions or concerns.       Bam Sexton [facility]  Clinical Pharmacist    NOTE: Dr. Silvestre ordered the vancomycin x 7 days after surgery today so stop date is now 24.          "

## 2024-02-12 NOTE — OP NOTE
Operative Note  Date of Admission:  2/10/2024  OR Date: 2/12/2024    Pre-op Diagnosis: Venous hypertension with active bleeding from venous stasis ulcer right lateral leg      Post-op Diagnosis:   Same    Procedure:   1) duplex ultrasound mapping of the greater saphenous vein from the knee to the ankle with ligation of the greater saphenous vein at the knee and excision of 14 cm segment of saphenous vein, lateral phlebectomies (less than 10 incisions); ligation of bleeding vessel within venous stasis ulcer;and sharp excisional debridement of ulcer measuring 6 x 5 cm.    Surgeon: Shahzad Ramos Jr, MD    Assistant: MANN Mathis CSA they provided critical assistance during the case including suctioning, exposure, retraction, and reduction of blood loss.    Anesthesia: General    Staff:   Circulator: Paola Leija RN  Scrub Person: Marcella Land  Assistant: Whit Burks CST    Estimated Blood Loss: 25-50 cc    Specimens:   Order Name Source Comment Collection Info Order Time   ANAEROBIC CULTURE Leg, Right  Collected By: Shahzad Ramos Jr., MD 2/12/2024 10:42 AM     Release to patient   Routine Release        TISSUE / BONE CULTURE Leg, Right  Collected By: Shahzad Ramos Jr., MD 2/12/2024 10:42 AM     Release to patient   Routine Release            Complications: None apparent    Findings: Bleeding vessel within the ulcer was suture-ligated with 3-0 nylon suture.    Indications:  As in preop diagnosis           Procedure: Patient was placed in the operative table in supine position.  After satisfactory general anesthesia was achieved the patient was prepped from the mid thigh distally circumferentially using Betadine scrub and Betadine solution draped in usual sterile fashion.  The bleeding vessel at 12:00 of the stasis ulcer was suture-ligated with a 3-0 nylon suture.  Using duplex ultrasound the greater saphenous vein was identified just below the level of the down to just above the level of  the ankle.  The skin was marked with an indelible marker at the the saphenous vein.  Small stab incisions were then made tween the knee and above the ankle and the saphenous vein identified and grasped.  Proximally just below the level of the saphenous vein was ligated with 3-0 silk suture and divided.  Small incisions were then made at the other markings and the greater saphenous vein was removed to just above the level of the ankle by grasping the vein and pulling it through the subcutaneous tunnel.  The distal end was ligated with 3-0 silk suture.  Next, the varicosities were identified laterally and small stab incisions were made and the vein graft here, placed on tension and a counterincision made and the vein graft and divided and pulled through the subcutaneous tunnel.  5 incisions were required to achieve this.  There was necrotic debris noted in the stasis ulcer and this was sharply excised with a 15 blade knife with the central portion of this sent for culture and sensitivity.  The ulcer measured 6 x 5 cm.  The wound was scraped with a 15 blade knife for any other debris and hemostasis was achieved using electrocautery.  The stab incisions were then closed with 3-0 nylon suture.  Nu-Knit Surgicel was placed over the stasis ulcer.  The leg was then wrapped in multiple layers including Kerlix, ABD pads, more Kerlix than 6 inch Ace wraps.  Sponge, needle, and instrument counts were all reported as correct.  Patient was then extubated transported to recovery room in satisfactory condition.      Radiographic Findings:  1) identification of the greater saphenous vein just below the level of the to just above the ankle using duplex ultrasound.  The segment of vein was then excised.  Varices were identified laterally above the level of the ulceration and were excised.      Active Hospital Problems    Diagnosis  POA    **Anasarca [R60.1]  Yes    Wound infection [T14.8XXA, L08.9]  Yes    Bacterial conjunctivitis  [H10.9]  Yes    Prediabetes [R73.03]  Yes    Diastolic CHF, acute [I50.31]  Yes    Abrasion of ankle with infection, right, initial encounter [S90.511A, L08.9]  Yes    Cellulitis of right ankle [L03.115]  Yes    Open wound [T14.8XXA]  Unknown    Pulmonary hypertension [I27.20]  Yes    Suspected sleep apnea [R29.818]  Yes    Obesity hypoventilation syndrome [E66.2]  Yes    Morbid (severe) obesity [E66.01]  Yes    Tobacco use disorder [F17.200]  Yes    Essential hypertension [I10]  Yes      Resolved Hospital Problems   No resolved problems to display.      Shahzad Ramos Jr., MD     Date: 2/12/2024  Time: 10:55 EST

## 2024-02-12 NOTE — PAYOR COMM NOTE
"Richard Rodrigez (44 y.o. Male)        PLEASE SEE ATTACHED FOR INPT AUTH.      REF#[id number]    PLEASE CALL  OR  [phone] WITH INPT AUTH.    THANK YOU    NAKIA COE LPN CCP   Date of Birth   1980    Social Security Number       Address   [address] [place] [address]    Home Phone   [phone]    MRN   [id number]       Caodaism   None    Marital Status                               Admission Date   2/10/24    Admission Type   Emergency    Admitting Provider   Manolo Silvestre MD    Attending Provider   Manolo Silvestre MD    Department, Room/Bed   [facility] MAIN OR, [facility] Main OR/MAIN OR       Discharge Date       Discharge Disposition       Discharge Destination                                 Attending Provider: Manolo Silvestre MD    Allergies: No Known Allergies    Isolation: None   Infection: None   Code Status: CPR    Ht: 182.9 cm (72\")   Wt: 145 kg (320 lb)    Admission Cmt: None   Principal Problem: Anasarca [R60.1]                   Active Insurance as of 2/10/2024       Primary Coverage       Payor Plan Insurance Group Employer/Plan Group    CIGNA CIGNA [id number]       Payor Plan Address Payor Plan Phone Number Payor Plan Fax Number Effective Dates    [address] [phone]  1/1/2024 - None Entered    [facility] [id number]         Subscriber Name Subscriber Birth Date Member ID       RICHARD RODRIGEZ 1980 [id number]                     Emergency Contacts        (Rel.) Home Phone Work Phone Mobile Phone    AIDE KING (Daughter) [phone] -- [phone]              [place]: [facility] [id number]  Tax ID [id number]     History & Physical        Starr Cage APRN at 02/10/24 0828       Attestation signed by Manolo Silvestre MD at 02/10/24 1343    I have reviewed this documentation and agree.  Brief Attending HP Note   I have seen and examined " the patient, performing an independent face-to-face diagnostic evaluation with plan of care reviewed and developed with the advanced practice clinician (APC).   Brief Summary Statement/HPI:   44-year-old male with history as listed presents the hospital with several days worsening leg swelling with bloody and purulent drainage on the right ankle wound.  Patient reports some mild pain.  He states the wound and chronic skin changes are from previous orthopedic surgery in the past.  He states he drinks excessively as he believes this is good for his heart.  He does take Lasix but notes he is still gaining weight.  Attending Physical Exam:  Constitutional: Awake, alert  Eyes: PERRLA, sclerae anicteric, no conjunctival injection  HENT: NCAT, mucous membranes moist  Neck: Supple, no thyromegaly, no lymphadenopathy, trachea midline  Respiratory: No cough or wheezes, normal inspiration, nonlabored respirations   Cardiovascular: Pulse rate is tachycardic initially but improved, palpable radial pulses bilaterally  Gastrointestinal: Morbidly obese, soft, nontender, nondistended  Musculoskeletal: Severely obese, BMI is 43, pitting bilateral lower extremity edema  Psychiatric: Appropriate affect, cooperative, conversational  Neurologic: No slurred speech or facial droop, follows commands  Skin: Right ankle ulceration with purulent drainage,  No rashes or jaundice, warm  Brief Assessment/Plan:  I performed greater than 50% of medical decision making  44-year-old with ankle wound in the setting of severe anasarca.  Plan for wound care which was ordered and discussed with nursing.  Consult wound care team.  Check MRI to further assess the depth of involvement of this injury.  Consult vascular surgery to assist with management.  Continue vancomycin and ceftriaxone.  Monitor vancomycin levels and ask pharmacist to assist with dosing.  Strongly recommend improved diet and exercise.  Check A1c.  Hyperbilirubinemia noted.  Patient has  history of chronic liver issues.  He agrees to follow-up closely with outpatient gastroenterology at discharge.  Plan to trend levels.  Tobacco cessation counseled.  Monitor blood pressure and adjust as needed.  Treatment plan discussed with patient who is in agreement.  For additional information see note and plan below as developed with APC :Fauzia which I have reviewed    Manolo Silvestre MD  02/10/24                           Patient Name:  Richard Rodrigez  YOB: 1980  MRN:  [id number]  Admit Date:  2/10/2024  Patient Care Team:  Jessie Almodovar III [id number] as PCP - General (Family Medicine)      Subjective  History Present Illness     Chief Complaint   Patient presents with    Wound Check    Groin Swelling       Mr. Rodrigez is a 44 y.o. male with a history of anasarca, pulmonary hypertension, suspected sleep apnea, obesity hypoventilation syndrome, acute respiratory failure with hypoxia and hypercapnia, hepatic steatosis, morbid obesity, tobacco abuse, alcohol abuse, hypertension that presents to [facility] complaining of bloody and infectious appearing drainage from right ankle wound for several days.  He previously had a right ankle fracture and hardware with second surgical procedure to remove the hardware several years ago.  The skin in that area has remained thin and scarred.  He denies any injury or trauma to the area recently but states the skin came open.  No fever, chills, sweating, nausea or vomiting.  Also has noticed increased swelling of the right leg and abdomen and severe swelling of scrotum for several days.  He denies shortness of breath, cough, orthopnea, chest pain, pressure or palpitations.  He has been compliant with all medications including his Lasix.  Has been watching his diet and eats no salt.  Unsure if he has gained any weight and does not weigh himself regularly.    Patient was hospitalized here at [facility] in December 2021  with anasarca and pulmonary hypertension, followed by pulmonology and cardiology.  Anasarca was not felt to be related to congestive heart failure at that point.  There was noted severe chronic CO2 retention consistent with the pulmonary hypertension and obesity hypoventilation syndrome.  BiPAP was trialed and the patient did not tolerate.  He was discharged with recommendations to have a sleep study.  He was diuresed during that stay and lost over 30 pounds.  He was started on antihypertensives.  There was some abnormality suggestive of liver mass  and instructions given to follow-up with outpatient MRI of the liver and GI outpatient appointment.     His eyes are red and irritated.  He denies having any knowledge of his eyes being red.  Eyes are not painful but they do itch a little bit and has had a lot of drainage in both eyes, crusted shut with thick crust in the morning, unsure how long.  -November 2023 he had irritated and bloodshot eyes beginning on the left and spreading to the right, he was seen in urgent care and treated for bacterial conjunctivitis with ophthalmic Cipro drops.      He was also found to be hypoxic at 85% on room air at the urgent care visit.  Does not wear oxygen at home or a CPAP.  He did not have appointment with sleep clinic after his hospitalization in 2021.  I explained and reiterated to the patient the critical importance of having an outpatient sleep study to treat his sleep apnea, which left untreated could be contributor to his increased swelling, as well as other cardiovascular risks and potential early death.  He verbalizes understanding.  We will ask CCP to schedule appointment with sleep medicine and provide the patient with the information he needs to attend this appointment.    Smokes cigars.  Denies drinking alcohol.  Works full-time as a .    Review of Systems   Constitutional:  Negative for appetite change, chills, diaphoresis, fatigue and fever.   HENT:  Negative for  congestion.    Respiratory:  Negative for cough, choking, chest tightness and shortness of breath.    Cardiovascular:  Positive for leg swelling. Negative for chest pain and palpitations.   Gastrointestinal:  Positive for abdominal distention. Negative for abdominal pain, constipation, diarrhea, nausea and vomiting.   Genitourinary:  Negative for difficulty urinating and dysuria.   Musculoskeletal:  Negative for myalgias.   Skin:  Positive for wound. Negative for rash.   Neurological:  Negative for dizziness, weakness, light-headedness, numbness and headaches.        Personal History     Past Medical History:   Diagnosis Date    Acute respiratory failure with hypoxia and hypercapnia 12/10/2021    Hypertension      Past Surgical History:   Procedure Laterality Date    FRACTURE SURGERY       Family History   Problem Relation Age of Onset    Stroke Mother     Hypertension Mother     No Known Problems Sister     No Known Problems Brother     No Known Problems Sister     No Known Problems Sister     No Known Problems Sister     Breast cancer Maternal Grandmother     Colon cancer Neg Hx     Prostate cancer Neg Hx      Social History     Tobacco Use    Smoking status: Every Day     Types: Cigars    Smokeless tobacco: Never   Vaping Use    Vaping Use: Never used   Substance Use Topics    Alcohol use: Yes     Alcohol/week: 2.0 standard drinks of alcohol     Types: 2 Shots of liquor per week     Comment: occ    Drug use: Never     No current facility-administered medications on file prior to encounter.     Current Outpatient Medications on File Prior to Encounter   Medication Sig Dispense Refill    furosemide (LASIX) 40 MG tablet Take 1 tablet by mouth 2 (Two) Times a Day. 180 tablet 3    lisinopril (PRINIVIL,ZESTRIL) 40 MG tablet Take 1 tablet by mouth Daily. 90 tablet 3    metoprolol tartrate (LOPRESSOR) 50 MG tablet Take 1 tablet by mouth 2 (Two) Times a Day. 180 tablet 3     No Known Allergies    Objective   Objective      Vital Signs  Temp:  [97.8 °F (36.6 °C)] 97.8 °F (36.6 °C)  Heart Rate:  [] 96  Resp:  [18-20] 20  BP: (122-162)/(67-93) 136/80  SpO2:  [89 %-96 %] 93 %  on  Flow (L/min):  [2] 2;   Device (Oxygen Therapy): nasal cannula  Body mass index is 43.4 kg/m².    Physical Exam  Constitutional:       General: He is not in acute distress.     Appearance: He is not diaphoretic.      Comments: Ill-appearing, morbidly obese  Anasarca   HENT:      Head: Normocephalic and atraumatic.   Eyes:      Extraocular Movements: Extraocular movements intact.      Comments: Bloodshot eyes   Cardiovascular:      Rate and Rhythm: Normal rate and regular rhythm.      Pulses: Normal pulses.      Heart sounds: No murmur heard.     Comments: DP PT and PT pulses palpable, +2  Heart and lung sounds distant and difficult to assess secondary to body habitus  Pulmonary:      Effort: No respiratory distress.      Comments: Shallow  Abdominal:      General: Bowel sounds are normal. There is no distension.      Palpations: Abdomen is soft.      Tenderness: There is no abdominal tenderness.   Musculoskeletal:         General: Swelling present.      Cervical back: Normal range of motion and neck supple.      Comments: Moderate to severe edema of abdomen, scrotum, bilateral legs   Skin:     General: Skin is warm and dry.      Comments: Right outer malleolus wound with slough and clear yellow drainage, surrounding tissue red, superficial layer of skin peeled off   Neurological:      General: No focal deficit present.      Mental Status: He is oriented to person, place, and time.   Psychiatric:         Mood and Affect: Mood normal.             Results Review:  I reviewed the patient's new clinical results.      Lab Results (last 24 hours)       Procedure Component Value Units Date/Time    CBC & Differential [[id number]]  (Abnormal) Collected: 02/10/24 0311    Specimen: Blood from Arm, Left Updated: 02/10/24 0416    Narrative:      The following orders  were created for panel order CBC & Differential.  Procedure                               Abnormality         Status                     ---------                               -----------         ------                     CBC Auto Differential[[id number]]        Abnormal            Final result                 Please view results for these tests on the individual orders.    Comprehensive Metabolic Panel [[id number]]  (Abnormal) Collected: 02/10/24 0311    Specimen: Blood from Arm, Left Updated: 02/10/24 0345     Glucose 65 mg/dL      BUN 40 mg/dL      Creatinine 1.28 mg/dL      Sodium 138 mmol/L      Potassium 5.0 mmol/L      Comment: Slight hemolysis detected by analyzer. Result may be falsely elevated.        Chloride 96 mmol/L      CO2 32.8 mmol/L      Calcium 9.3 mg/dL      Total Protein 8.0 g/dL      Albumin 3.0 g/dL      ALT (SGPT) 35 U/L      AST (SGOT) 68 U/L      Alkaline Phosphatase 145 U/L      Total Bilirubin 5.4 mg/dL      Globulin 5.0 gm/dL      A/G Ratio 0.6 g/dL      BUN/Creatinine Ratio 31.3     Anion Gap 9.2 mmol/L      eGFR 71.2 mL/min/1.73     Narrative:      GFR Normal >60  Chronic Kidney Disease <60  Kidney Failure <15      BNP [[id number]]  (Abnormal) Collected: 02/10/24 0311    Specimen: Blood from Arm, Left Updated: 02/10/24 0351     proBNP 3,398.0 pg/mL     Narrative:      This assay is used as an aid in the diagnosis of individuals suspected of having heart failure. It can be used as an aid in the diagnosis of acute decompensated heart failure (ADHF) in patients presenting with signs and symptoms of ADHF to the emergency department (ED). In addition, NT-proBNP of <300 pg/mL indicates ADHF is not likely.    Age Range Result Interpretation  NT-proBNP Concentration (pg/mL:      <50             Positive            >450                   Gray                 300-450                    Negative             <300    50-75           Positive            >900                  Salcedo                 300-900                  Negative            <300      >75             Positive            >1800                  Gray                300-1800                  Negative            <300    High Sensitivity Troponin T [[id number]]  (Abnormal) Collected: 02/10/24 0311    Specimen: Blood from Arm, Left Updated: 02/10/24 0353     HS Troponin T 88 ng/L     Narrative:      High Sensitive Troponin T Reference Range:  <14.0 ng/L- Negative Female for AMI  <22.0 ng/L- Negative Male for AMI  >=14 - Abnormal Female indicating possible myocardial injury.  >=22 - Abnormal Male indicating possible myocardial injury.   Clinicians would have to utilize clinical acumen, EKG, Troponin, and serial changes to determine if it is an Acute Myocardial Infarction or myocardial injury due to an underlying chronic condition.         Sedimentation Rate [[id number]]  (Abnormal) Collected: 02/10/24 0311    Specimen: Blood from Arm, Left Updated: 02/10/24 0355     Sed Rate 19 mm/hr     C-reactive Protein [[id number]]  (Abnormal) Collected: 02/10/24 0311    Specimen: Blood from Arm, Left Updated: 02/10/24 0344     C-Reactive Protein 2.52 mg/dL     Lactic Acid, Plasma [[id number]]  (Abnormal) Collected: 02/10/24 0311    Specimen: Blood from Arm, Left Updated: 02/10/24 0353     Lactate 2.2 mmol/L     Procalcitonin [[id number]]  (Abnormal) Collected: 02/10/24 0311    Specimen: Blood from Arm, Left Updated: 02/10/24 0351     Procalcitonin 0.37 ng/mL     Narrative:      As a Marker for Sepsis (Non-Neonates):    1. <0.5 ng/mL represents a low risk of severe sepsis and/or septic shock.  2. >2 ng/mL represents a high risk of severe sepsis and/or septic shock.    As a Marker for Lower Respiratory Tract Infections that require antibiotic therapy:    PCT on Admission    Antibiotic Therapy       6-12 Hrs later    >0.5                Strongly Recommended  >0.25 - <0.5        Recommended   0.1 - 0.25          Discouraged              Remeasure/reassess PCT  <0.1        "         Strongly Discouraged     Remeasure/reassess PCT    As 28 day mortality risk marker: \"Change in Procalcitonin Result\" (>80% or <=80%) if Day 0 (or Day 1) and Day 4 values are available. Refer to http://www.[facility]-pct-calculator.com    Change in PCT <=80%  A decrease of PCT levels below or equal to 80% defines a positive change in PCT test result representing a higher risk for 28-day all-cause mortality of patients diagnosed with severe sepsis for septic shock.    Change in PCT >80%  A decrease of PCT levels of more than 80% defines a negative change in PCT result representing a lower risk for 28-day all-cause mortality of patients diagnosed with severe sepsis or septic shock.       CBC Auto Differential [[id number]]  (Abnormal) Collected: 02/10/24 0311    Specimen: Blood from Arm, Left Updated: 02/10/24 0416     WBC 6.08 10*3/mm3      RBC 6.50 10*6/mm3      Hemoglobin 16.8 g/dL      Hematocrit 56.6 %      MCV 87.1 fL      MCH 25.8 pg      MCHC 29.7 g/dL      RDW 19.8 %      RDW-SD 57.9 fl      MPV 10.2 fL      Platelets 194 10*3/mm3     Magnesium [[id number]]  (Normal) Collected: 02/10/24 0311    Specimen: Blood from Arm, Left Updated: 02/10/24 0345     Magnesium 2.2 mg/dL     Manual Differential [[id number]]  (Abnormal) Collected: 02/10/24 0311    Specimen: Blood from Arm, Left Updated: 02/10/24 0446     Neutrophil % 75.9 %      Lymphocyte % 19.5 %      Monocyte % 4.6 %      Neutrophils Absolute 4.61 10*3/mm3      Lymphocytes Absolute 1.19 10*3/mm3      Monocytes Absolute 0.28 10*3/mm3      Anisocytosis Mod/2+     Macrocytes Mod/2+     Poikilocytes Mod/2+     Polychromasia Mod/2+     Target Cells Mod/2+     Smudge Cells Slight/1+     Platelet Morphology Normal    TSH [[id number]]  (Normal) Collected: 02/10/24 0311    Specimen: Blood from Arm, Left Updated: 02/10/24 0455     TSH 4.040 uIU/mL     T4, Free [[id number]]  (Normal) Collected: 02/10/24 0311    Specimen: Blood from Arm, Left Updated: 02/10/24 0455     " Free T4 1.03 ng/dL     Narrative:      Results may be falsely increased if patient taking Biotin.      Bilirubin, Direct [[id number]]  (Abnormal) Collected: 02/10/24 0311    Specimen: Blood from Arm, Left Updated: 02/10/24 0525     Bilirubin, Direct 3.9 mg/dL     Hepatic Function Panel [[id number]]  (Abnormal) Collected: 02/10/24 0311    Specimen: Blood from Arm, Left Updated: 02/10/24 0700     Total Protein 8.3 g/dL      Albumin 3.1 g/dL      ALT (SGPT) 35 U/L      AST (SGOT) 68 U/L      Alkaline Phosphatase 142 U/L      Total Bilirubin 5.5 mg/dL      Bilirubin, Direct 3.9 mg/dL      Comment: Specimen hemolyzed. Results may be affected.        Bilirubin, Indirect 1.6 mg/dL     Wound Culture - Wound, Ankle, Right [[id number]] Collected: 02/10/24 0314    Specimen: Wound from Ankle, Right Updated: 02/10/24 0317    STAT Lactic Acid, Reflex [[id number]]  (Normal) Collected: 02/10/24 0711    Specimen: Blood Updated: 02/10/24 0743     Lactate 1.7 mmol/L     High Sensitivity Troponin T 2Hr [[id number]]  (Abnormal) Collected: 02/10/24 0711    Specimen: Blood Updated: 02/10/24 0750     HS Troponin T 76 ng/L      Troponin T Delta -12 ng/L     Narrative:      High Sensitive Troponin T Reference Range:  <14.0 ng/L- Negative Female for AMI  <22.0 ng/L- Negative Male for AMI  >=14 - Abnormal Female indicating possible myocardial injury.  >=22 - Abnormal Male indicating possible myocardial injury.   Clinicians would have to utilize clinical acumen, EKG, Troponin, and serial changes to determine if it is an Acute Myocardial Infarction or myocardial injury due to an underlying chronic condition.         Blood Culture - Blood, Arm, Right [[id number]] Collected: 02/10/24 0711    Specimen: Blood from Arm, Right Updated: 02/10/24 0723    Basic Metabolic Panel [[id number]]  (Abnormal) Collected: 02/10/24 0711    Specimen: Blood Updated: 02/10/24 0749     Glucose 76 mg/dL      BUN 39 mg/dL      Creatinine 1.22 mg/dL      Sodium 139 mmol/L       Potassium 5.0 mmol/L      Chloride 98 mmol/L      CO2 30.5 mmol/L      Calcium 9.0 mg/dL      BUN/Creatinine Ratio 32.0     Anion Gap 10.5 mmol/L      eGFR 75.4 mL/min/1.73     Narrative:      GFR Normal >60  Chronic Kidney Disease <60  Kidney Failure <15      CBC (No Diff) [[id number]]  (Abnormal) Collected: 02/10/24 0711    Specimen: Blood Updated: 02/10/24 0744     WBC 6.50 10*3/mm3      RBC 6.36 10*6/mm3      Hemoglobin 16.5 g/dL      Hematocrit 55.4 %      MCV 87.1 fL      MCH 25.9 pg      MCHC 29.8 g/dL      RDW 20.2 %      RDW-SD 56.7 fl      MPV 10.5 fL      Platelets 202 10*3/mm3     Hemoglobin A1c [[id number]]  (Abnormal) Collected: 02/10/24 0711    Specimen: Blood Updated: 02/10/24 1123     Hemoglobin A1C 6.30 %     Narrative:      Hemoglobin A1C Ranges:    Increased Risk for Diabetes  5.7% to 6.4%  Diabetes                     >= 6.5%  Diabetic Goal                < 7.0%    Blood Culture - Blood, Arm, Left [[id number]] Collected: 02/10/24 0859    Specimen: Blood from Arm, Left Updated: 02/10/24 0913            Imaging Results (Last 24 Hours)       Procedure Component Value Units Date/Time    XR Ankle 3+ View Right [[id number]] Collected: 02/10/24 0706     Updated: 02/10/24 0706    Narrative:        Patient: RENATO ORR  Time Out: 07:06  Exam(s): XR RIGHT ANKLE     EXAM:    XR Right Ankle Complete, 3 or More Views    CLINICAL HISTORY:     Reason for exam: Open wound.    TECHNIQUE:    Frontal, lateral and oblique views of the right ankle.    COMPARISON:    No relevant prior studies available.    FINDINGS:    Bones joints:  Advanced degenerative changes throughout the entire   ankle without evidence of erosive or destructive change.  No fractures or   dislocations identified diffuse soft tissue swelling about the ankle.    Fusion of the distal tibia and fibula.    Soft tissues:  See above.    IMPRESSION:         No acute findings in the right ankle.    Extensive degenerative changes described above       Impression:          Electronically signed by Jono Cifuentes MD on 02-10-24 at 0706    XR Chest 1 View [[id number]] Collected: 02/10/24 0658     Updated: 02/10/24 0658    Narrative:        Patient: RENATO ORR  Time Out: 06:57  Exam(s): XR CXR 1 VIEW     EXAM:    XR Chest, 1 View    CLINICAL HISTORY:     Reason for exam: volume overload.    TECHNIQUE:    Frontal view of the chest.    COMPARISON:    No relevant prior studies available.    FINDINGS:    Lungs:  Unremarkable.  No consolidation.    Pleural space:  Unremarkable.  No pneumothorax.    Heart:  Cardiomegaly.    Mediastinum:  Unremarkable.  Normal mediastinal contour.    Bones joints:  Unremarkable.  No acute fracture.    IMPRESSION:         No acute findings in the chest.      Impression:          Electronically signed by Jono Cifuentes MD on 02-10-24 at 0657            Results for orders placed during the hospital encounter of 12/07/21    Adult Transthoracic Echo Complete W/ Cont if Necessary Per Protocol    Interpretation Summary  · Left ventricular wall thickness is consistent with mild concentric hypertrophy.  · Estimated left ventricular EF = 57% Left ventricular systolic function is normal.  · Left ventricular diastolic function was indeterminate.  · Mildly reduced right ventricular systolic function noted.      ECG 12 Lead Dyspnea   Preliminary Result   HEART RATE= 97  bpm   RR Interval= 619  ms   AK Interval= 197  ms   P Horizontal Axis= -16  deg   P Front Axis= 73  deg   QRSD Interval= 101  ms   QT Interval= 352  ms   QTcB= 447  ms   QRS Axis= 125  deg   T Wave Axis= 14  deg   - ABNORMAL ECG -   Sinus rhythm   Borderline prolonged AK interval   Right axis deviation   Low voltage, precordial leads   Consider anterior infarct   Electronically Signed By:    Date and Time of Study: 2024-02-10 02:33:53           Assessment/Plan     Active Hospital Problems    Diagnosis  POA    **Anasarca [R60.1]  Yes    Wound infection [T14.8XXA, L08.9]   Unknown    Bacterial conjunctivitis [H10.9]  Unknown    Prediabetes [R73.03]  Unknown    Pulmonary hypertension [I27.20]  Yes    Suspected sleep apnea [R29.818]  Yes    Obesity hypoventilation syndrome [E66.2]  Yes    Morbid (severe) obesity [E66.01]  Yes    Tobacco use disorder [F17.200]  Yes    Essential hypertension [I10]  Yes      Resolved Hospital Problems   No resolved problems to display.       Received 80 mg of Lasix around 430 this morning and has had more than 1000 mL of urine output.  Resume Lasix 40 mg every 8 hours x 2 doses.  Monitor labs in the morning.  Slight bump in the creatinine on initial, now decreased.  Check echocardiogram.  Consider cardiology consult, Dr. Moreno's group familiar with this patient.  Education provided regarding daily weight and when to call PCP with swelling/weight gain.    Continue vancomycin and Rocephin for wound infection.  Dakin's 1/4 strength 0.125% wet-to-dry gauze, wrapped loosely with Kerlix.  Wound culture and blood cultures pending.  Increased sed rate and CRP.elevated lactate and procalcitonin with normal white count.  Check MRI of the right ankle without contrast. Consult vascular surgery.  Follow cultures.    Ciprofloxacin ophthalmic solution for bacterial conjunctivitis, 2 drops every 2 hours while awake x 2 days, then every 4 hours x 5 days.    CCP consult for assistance with setting up appointment with sleep medicine.  Continuous pulse oximetry monitoring, likely will need oxygen at night.    Nutrition consult for weight loss diet and prediabetes diet education.  Follow-up with PCP and recommend close monitoring/counseling to assess progress towards goals.    Resume antihypertensive regimen.        I discussed the patient's findings and my recommendations with patient.    VTE Prophylaxis -no lovenox for now until Vascular consult/MRI results.   Code Status - Full code.       QUIQUE Block  [facility]  02/10/24  13:12  EST    Electronically signed by Manolo Silvestre MD at 02/10/24 1343          Emergency Department Notes        Enzo Vu, RN at 02/10/24 0755          Dr. Silvestre contacted in regards to Blood culture difficulty.  Notified that lab is coming to attempt d/t difficulty.  Dr. Silvestre instructed to hold antibiotics until blood cultures drawn d/t patient being hemodynamically stable at this time.  /102 HR 97.      Electronically signed by Enzo Vu, RN at 02/10/24 0757       Eliel Hewitt RN at 02/10/24 0723          .Nursing report ED to floor  Richard Rodrigez  44 y.o.  male    HPI :   Chief Complaint   Patient presents with    Wound Check    Groin Swelling       Admitting doctor:   Manolo Silvestre MD    Admitting diagnosis:   The primary encounter diagnosis was Anasarca. Diagnoses of Open wound-right lower extremity, Acute kidney injury (DIEGO) with acute tubular necrosis (ATN), Elevated troponin, Sepsis, due to unspecified organism, unspecified whether acute organ dysfunction present, and Hyperbilirubinemia were also pertinent to this visit.    Code status:   Current Code Status       Date Active Code Status Order ID Comments User Context       2/10/2024 0437 CPR (Attempt to Resuscitate) [id number]  Keyla Galdamez APRN ED        Question Answer    Code Status (Patient has no pulse and is not breathing) CPR (Attempt to Resuscitate)    Medical Interventions (Patient has pulse or is breathing) Full Support                    Allergies:   Patient has no known allergies.    Isolation:   No active isolations    Intake and Output  No intake or output data in the 24 hours ending 02/10/24 0723    Weight:       02/10/24  0018   Weight: (!) 145 kg (320 lb)       Most recent vitals:   Vitals:    02/10/24 0034 02/10/24 0501 02/10/24 0601 02/10/24 0631   BP: 122/67 160/93 162/90 149/84   Pulse: 114 98 98 102   Resp: 20      Temp:       TempSrc:       SpO2: 92% 93%     Weight:        Height:           Active LDAs/IV Access:   Lines, Drains & Airways       Active LDAs       Name Placement date Placement time Site Days    Peripheral IV 02/10/24 0312 Left Antecubital 02/10/24  0312  Antecubital  less than 1                    Labs (abnormal labs have a star):   Labs Reviewed   COMPREHENSIVE METABOLIC PANEL - Abnormal; Notable for the following components:       Result Value    BUN 40 (*)     Creatinine 1.28 (*)     Chloride 96 (*)     CO2 32.8 (*)     Albumin 3.0 (*)     AST (SGOT) 68 (*)     Alkaline Phosphatase 145 (*)     Total Bilirubin 5.4 (*)     BUN/Creatinine Ratio 31.3 (*)     All other components within normal limits    Narrative:     GFR Normal >60  Chronic Kidney Disease <60  Kidney Failure <15     BNP (IN-HOUSE) - Abnormal; Notable for the following components:    proBNP 3,398.0 (*)     All other components within normal limits    Narrative:     This assay is used as an aid in the diagnosis of individuals suspected of having heart failure. It can be used as an aid in the diagnosis of acute decompensated heart failure (ADHF) in patients presenting with signs and symptoms of ADHF to the emergency department (ED). In addition, NT-proBNP of <300 pg/mL indicates ADHF is not likely.    Age Range Result Interpretation  NT-proBNP Concentration (pg/mL:      <50             Positive            >450                   Gray                 300-450                    Negative             <300    50-75           Positive            >900                  Gray                300-900                  Negative            <300      >75             Positive            >1800                  Gray                300-1800                  Negative            <300   TROPONIN - Abnormal; Notable for the following components:    HS Troponin T 88 (*)     All other components within normal limits    Narrative:     High Sensitive Troponin T Reference Range:  <14.0 ng/L- Negative Female for AMI  <22.0 ng/L-  "Negative Male for AMI  >=14 - Abnormal Female indicating possible myocardial injury.  >=22 - Abnormal Male indicating possible myocardial injury.   Clinicians would have to utilize clinical acumen, EKG, Troponin, and serial changes to determine if it is an Acute Myocardial Infarction or myocardial injury due to an underlying chronic condition.        SEDIMENTATION RATE - Abnormal; Notable for the following components:    Sed Rate 19 (*)     All other components within normal limits   C-REACTIVE PROTEIN - Abnormal; Notable for the following components:    C-Reactive Protein 2.52 (*)     All other components within normal limits   LACTIC ACID, PLASMA - Abnormal; Notable for the following components:    Lactate 2.2 (*)     All other components within normal limits   PROCALCITONIN - Abnormal; Notable for the following components:    Procalcitonin 0.37 (*)     All other components within normal limits    Narrative:     As a Marker for Sepsis (Non-Neonates):    1. <0.5 ng/mL represents a low risk of severe sepsis and/or septic shock.  2. >2 ng/mL represents a high risk of severe sepsis and/or septic shock.    As a Marker for Lower Respiratory Tract Infections that require antibiotic therapy:    PCT on Admission    Antibiotic Therapy       6-12 Hrs later    >0.5                Strongly Recommended  >0.25 - <0.5        Recommended   0.1 - 0.25          Discouraged              Remeasure/reassess PCT  <0.1                Strongly Discouraged     Remeasure/reassess PCT    As 28 day mortality risk marker: \"Change in Procalcitonin Result\" (>80% or <=80%) if Day 0 (or Day 1) and Day 4 values are available. Refer to http://[email or web address]-pct-calculator.com    Change in PCT <=80%  A decrease of PCT levels below or equal to 80% defines a positive change in PCT test result representing a higher risk for 28-day all-cause mortality of patients diagnosed with severe sepsis for septic shock.    Change in PCT >80%  A decrease of PCT levels " of more than 80% defines a negative change in PCT result representing a lower risk for 28-day all-cause mortality of patients diagnosed with severe sepsis or septic shock.      CBC WITH AUTO DIFFERENTIAL - Abnormal; Notable for the following components:    RBC 6.50 (*)     Hematocrit 56.6 (*)     MCH 25.8 (*)     MCHC 29.7 (*)     RDW 19.8 (*)     RDW-SD 57.9 (*)     All other components within normal limits   MANUAL DIFFERENTIAL - Abnormal; Notable for the following components:    Lymphocyte % 19.5 (*)     Monocyte % 4.6 (*)     All other components within normal limits   BILIRUBIN, DIRECT - Abnormal; Notable for the following components:    Bilirubin, Direct 3.9 (*)     All other components within normal limits   HEPATIC FUNCTION PANEL - Abnormal; Notable for the following components:    Albumin 3.1 (*)     AST (SGOT) 68 (*)     Alkaline Phosphatase 142 (*)     Total Bilirubin 5.5 (*)     Bilirubin, Direct 3.9 (*)     All other components within normal limits   MAGNESIUM - Normal   TSH - Normal   T4, FREE - Normal    Narrative:     Results may be falsely increased if patient taking Biotin.     WOUND CULTURE   BLOOD CULTURE   BLOOD CULTURE   LACTIC ACID, REFLEX   HIGH SENSITIVITIY TROPONIN T 2HR   BASIC METABOLIC PANEL   CBC (NO DIFF)   CBC AND DIFFERENTIAL    Narrative:     The following orders were created for panel order CBC & Differential.  Procedure                               Abnormality         Status                     ---------                               -----------         ------                     CBC Auto Differential[[id number]]        Abnormal            Final result                 Please view results for these tests on the individual orders.       EKG:   ECG 12 Lead Dyspnea   Preliminary Result   HEART RATE= 97  bpm   RR Interval= 619  ms   RI Interval= 197  ms   P Horizontal Axis= -16  deg   P Front Axis= 73  deg   QRSD Interval= 101  ms   QT Interval= 352  ms   QTcB= 447  ms   QRS Axis= 125   deg   T Wave Axis= 14  deg   - ABNORMAL ECG -   Sinus rhythm   Borderline prolonged TN interval   Right axis deviation   Low voltage, precordial leads   Consider anterior infarct   Electronically Signed By:    Date and Time of Study: 2024-02-10 02:33:53          Meds given in ED:   Medications   sodium chloride 0.9 % flush 10 mL (has no administration in time range)   vancomycin 3000 mg/500 mL 0.9% NS IVPB ([facility]) (has no administration in time range)   cefTRIAXone (ROCEPHIN) 2,000 mg in sodium chloride 0.9 % 100 mL IVPB-VTB (has no administration in time range)   sodium chloride 0.9 % flush 10 mL (has no administration in time range)   sodium chloride 0.9 % flush 10 mL (has no administration in time range)   sodium chloride 0.9 % infusion 40 mL (has no administration in time range)   nitroglycerin (NITROSTAT) SL tablet 0.4 mg (has no administration in time range)   acetaminophen (TYLENOL) tablet 650 mg (has no administration in time range)     Or   acetaminophen (TYLENOL) 160 MG/5ML oral solution 650 mg (has no administration in time range)     Or   acetaminophen (TYLENOL) suppository 650 mg (has no administration in time range)   sennosides-docusate (PERICOLACE) 8.6-50 MG per tablet 2 tablet (has no administration in time range)     And   polyethylene glycol (MIRALAX) packet 17 g (has no administration in time range)     And   bisacodyl (DULCOLAX) EC tablet 5 mg (has no administration in time range)     And   bisacodyl (DULCOLAX) suppository 10 mg (has no administration in time range)   ondansetron ODT (ZOFRAN-ODT) disintegrating tablet 4 mg (has no administration in time range)     Or   ondansetron (ZOFRAN) injection 4 mg (has no administration in time range)   calcium carbonate (TUMS) chewable tablet 500 mg (200 mg elemental) (has no administration in time range)   Pharmacy to dose vancomycin (has no administration in time range)   cefTRIAXone (ROCEPHIN) 2,000 mg in sodium chloride 0.9 % 100 mL IVPB-VTB (has no  administration in time range)   vancomycin (VANCOCIN) 1000 mg/200 mL dextrose 5% IVPB (has no administration in time range)   aspirin chewable tablet 324 mg (324 mg Oral Given 2/10/24 0635)   furosemide (LASIX) injection 80 mg (80 mg Intravenous Given 2/10/24 0631)   morphine injection 4 mg (4 mg Intravenous Given 2/10/24 0636)       Imaging results:  XR Ankle 3+ View Right    Result Date: 2/10/2024  Electronically signed by Jono Cifuentes MD on 02-10-24 at 0706    XR Chest 1 View    Result Date: 2/10/2024  Electronically signed by Jono Cifuentes MD on 02-10-24 at 0657     Ambulatory status:   - Assist    Social issues:   Social History     Socioeconomic History    Marital status:    Tobacco Use    Smoking status: Every Day     Types: Cigars    Smokeless tobacco: Never   Substance and Sexual Activity    Alcohol use: Yes     Alcohol/week: 2.0 standard drinks of alcohol     Types: 2 Shots of liquor per week     Comment: occ    Drug use: Never    Sexual activity: Defer       Eliel Hewitt RN  02/10/24 07:23 EST         Electronically signed by Eliel Hewitt RN at 02/10/24 0723       Jono Houston MD at 02/10/24 0222           EMERGENCY DEPARTMENT ENCOUNTER  Room Number:  14/14  PCP: Jessie Almodovar III NP-C  Independent Historians: Patient      HPI:  Chief Complaint: had concerns including Wound Check and Groin Swelling.     A complete HPI/ROS/PMH/PSH/SH/FH are unobtainable due to: None    Chronic or social conditions impacting patient care (Social Determinants of Health): None      Context: Richard Rodrigez is a 44 y.o. male with a medical history of anasarca, morbid obesity, hypertension and pulmonary hypertension who presents to the ED c/o acute drainage from right ankle over the last week.  Patient reports he has chronic swelling and has been taking his medications but in the last few weeks he is also noted his scrotum more swollen.  No dysuria hematuria.  No chest pain or  shortness of breath reported.  Unsure about weight gain.  Patient reports he had prior injury/scar to the lateral right ankle.  In the last week it is opened up and started draining blood and liquid.  Denies pain from this area.  Denies new injury.  Not aware any fevers.      Review of prior external notes (non-ED) -and- Review of prior external test results outside of this encounter:  Hospital discharge summary 12/13/2021 reviewed:  Patient admitted for groin swelling and urinary retention found to have anasarca, pulmonary hypertension, acute respiratory failure, obesity hypoventilation syndrome and hepatic steatosis as well as hypertension      PAST MEDICAL HISTORY  Active Ambulatory Problems     Diagnosis Date Noted    Anasarca 12/07/2021    Morbid (severe) obesity due to excess calories 12/07/2021    Tobacco use disorder 12/07/2021    Essential hypertension 12/07/2021    Hepatic steatosis 12/08/2021    Suspected sleep apnea 12/10/2021    Obesity hypoventilation syndrome 12/10/2021    Pulmonary hypertension 12/11/2021    Liver mass 01/10/2022    Hepatic hemangioma 02/10/2022     Resolved Ambulatory Problems     Diagnosis Date Noted    Acute urinary retention 12/07/2021    Acute respiratory failure with hypoxia and hypercapnia 12/10/2021     Past Medical History:   Diagnosis Date    Hypertension          PAST SURGICAL HISTORY  Past Surgical History:   Procedure Laterality Date    FRACTURE SURGERY           FAMILY HISTORY  Family History   Problem Relation Age of Onset    Stroke Mother     Hypertension Mother     No Known Problems Sister     No Known Problems Brother     No Known Problems Sister     No Known Problems Sister     No Known Problems Sister     Breast cancer Maternal Grandmother     Colon cancer Neg Hx     Prostate cancer Neg Hx          SOCIAL HISTORY  Social History     Socioeconomic History    Marital status:    Tobacco Use    Smoking status: Every Day     Types: Cigars    Smokeless tobacco:  Never   Substance and Sexual Activity    Alcohol use: Yes     Alcohol/week: 2.0 standard drinks of alcohol     Types: 2 Shots of liquor per week     Comment: occ    Drug use: Never    Sexual activity: Defer         ALLERGIES  Patient has no known allergies.      REVIEW OF SYSTEMS  Review of Systems  Included in HPI  All systems reviewed and negative except for those discussed in HPI.      PHYSICAL EXAM    I have reviewed the triage vital signs and nursing notes.    ED Triage Vitals   Temp Heart Rate Resp BP SpO2   02/10/24 0018 02/10/24 0018 02/10/24 0018 02/10/24 0034 02/10/24 0018   97.8 °F (36.6 °C) (!) 129 18 122/67 (!) 89 %      Temp src Heart Rate Source Patient Position BP Location FiO2 (%)   02/10/24 0018 02/10/24 0018 -- -- --   Oral Monitor          Physical Exam    Physical Exam   Constitutional: No distress.  Nontoxic  HENT:  Head: Normocephalic and atraumatic.   Oropharynx: Mucous membranes are moist.   Eyes: . No scleral icterus. No conjunctival pallor.  Neck: Normal range of motion. Neck supple.   Cardiovascular: Pink warm and well perfused throughout.    Pulmonary/Chest: No respiratory distress.  Mild tachypnea.  Diminished breath sounds throughout.  Abdominal: Very large habitus that is distended.  4+ pitting edema of the abdominal wall and scrotum   musculoskeletal: 4+ pitting edema bilateral lower extremities with open wound over the right lateral malleolus with foul-smelling discharge.  No crepitance appreciated.  Neurological: Alert and oriented.  No acute focal deficit appreciated.  Skin: Skin is pink, warm, and dry.   Psychiatric: Mood and affect normal.   Nursing note and vitals reviewed.             LAB RESULTS  Recent Results (from the past 24 hour(s))   ECG 12 Lead Dyspnea    Collection Time: 02/10/24  2:33 AM   Result Value Ref Range    QT Interval 352 ms    QTC Interval 447 ms   Comprehensive Metabolic Panel    Collection Time: 02/10/24  3:11 AM    Specimen: Arm, Left; Blood   Result  Value Ref Range    Glucose 65 65 - 99 mg/dL    BUN 40 (H) 6 - 20 mg/dL    Creatinine 1.28 (H) 0.76 - 1.27 mg/dL    Sodium 138 136 - 145 mmol/L    Potassium 5.0 3.5 - 5.2 mmol/L    Chloride 96 (L) 98 - 107 mmol/L    CO2 32.8 (H) 22.0 - 29.0 mmol/L    Calcium 9.3 8.6 - 10.5 mg/dL    Total Protein 8.0 6.0 - 8.5 g/dL    Albumin 3.0 (L) 3.5 - 5.2 g/dL    ALT (SGPT) 35 1 - 41 U/L    AST (SGOT) 68 (H) 1 - 40 U/L    Alkaline Phosphatase 145 (H) 39 - 117 U/L    Total Bilirubin 5.4 (H) 0.0 - 1.2 mg/dL    Globulin 5.0 gm/dL    A/G Ratio 0.6 g/dL    BUN/Creatinine Ratio 31.3 (H) 7.0 - 25.0    Anion Gap 9.2 5.0 - 15.0 mmol/L    eGFR 71.2 >60.0 mL/min/1.73   BNP    Collection Time: 02/10/24  3:11 AM    Specimen: Arm, Left; Blood   Result Value Ref Range    proBNP 3,398.0 (H) 0.0 - 450.0 pg/mL   High Sensitivity Troponin T    Collection Time: 02/10/24  3:11 AM    Specimen: Arm, Left; Blood   Result Value Ref Range    HS Troponin T 88 (C) <22 ng/L   Sedimentation Rate    Collection Time: 02/10/24  3:11 AM    Specimen: Arm, Left; Blood   Result Value Ref Range    Sed Rate 19 (H) 0 - 15 mm/hr   C-reactive Protein    Collection Time: 02/10/24  3:11 AM    Specimen: Arm, Left; Blood   Result Value Ref Range    C-Reactive Protein 2.52 (H) 0.00 - 0.50 mg/dL   Lactic Acid, Plasma    Collection Time: 02/10/24  3:11 AM    Specimen: Arm, Left; Blood   Result Value Ref Range    Lactate 2.2 (C) 0.5 - 2.0 mmol/L   Procalcitonin    Collection Time: 02/10/24  3:11 AM    Specimen: Arm, Left; Blood   Result Value Ref Range    Procalcitonin 0.37 (H) 0.00 - 0.25 ng/mL   CBC Auto Differential    Collection Time: 02/10/24  3:11 AM    Specimen: Arm, Left; Blood   Result Value Ref Range    WBC 6.08 3.40 - 10.80 10*3/mm3    RBC 6.50 (H) 4.14 - 5.80 10*6/mm3    Hemoglobin 16.8 13.0 - 17.7 g/dL    Hematocrit 56.6 (H) 37.5 - 51.0 %    MCV 87.1 79.0 - 97.0 fL    MCH 25.8 (L) 26.6 - 33.0 pg    MCHC 29.7 (L) 31.5 - 35.7 g/dL    RDW 19.8 (H) 12.3 - 15.4 %     RDW-SD 57.9 (H) 37.0 - 54.0 fl    MPV 10.2 6.0 - 12.0 fL    Platelets 194 140 - 450 10*3/mm3   Magnesium    Collection Time: 02/10/24  3:11 AM    Specimen: Arm, Left; Blood   Result Value Ref Range    Magnesium 2.2 1.6 - 2.6 mg/dL         RADIOLOGY  No Radiology Exams Resulted Within Past 24 Hours      MEDICATIONS GIVEN IN ER  Medications   sodium chloride 0.9 % flush 10 mL (has no administration in time range)   aspirin chewable tablet 324 mg (has no administration in time range)   vancomycin 3000 mg/500 mL 0.9% NS IVPB ([facility]) (has no administration in time range)   cefTRIAXone (ROCEPHIN) 2,000 mg in sodium chloride 0.9 % 100 mL IVPB-VTB (has no administration in time range)   furosemide (LASIX) injection 80 mg (has no administration in time range)   morphine injection 4 mg (has no administration in time range)         ORDERS PLACED DURING THIS VISIT:  Orders Placed This Encounter   Procedures    Wound Culture - Wound, Ankle, Right    Blood Culture - Blood,    Blood Culture - Blood,    XR Chest 1 View    XR Ankle 3+ View Right    Comprehensive Metabolic Panel    BNP    High Sensitivity Troponin T    Sedimentation Rate    C-reactive Protein    Lactic Acid, Plasma    Procalcitonin    CBC Auto Differential    Magnesium    Manual Differential    STAT Lactic Acid, Reflex    High Sensitivity Troponin T 2Hr    TSH    T4, Free    Bilirubin, Direct    Monitor Blood Pressure    Pulse Oximetry, Continuous    Irrigate wound    Wound Dressing    Okay for sips and ice chips  Misc Nursing Order (Specify)    [facility] (on-call MD unless specified) Details    ECG 12 Lead Dyspnea    Insert Peripheral IV    Inpatient Admission    CBC & Differential         OUTPATIENT MEDICATION MANAGEMENT:  Current Facility-Administered Medications Ordered in Epic   Medication Dose Route Frequency Provider Last Rate Last Admin    aspirin chewable tablet 324 mg  324 mg Oral Once Jono Houston MD        cefTRIAXone (ROCEPHIN) 2,000 mg in sodium chloride 0.9  % 100 mL IVPB-VTB  2,000 mg Intravenous Once Jono Houston MD        furosemide (LASIX) injection 80 mg  80 mg Intravenous Once Jono Houston MD        morphine injection 4 mg  4 mg Intravenous Once Jono Houston MD        sodium chloride 0.9 % flush 10 mL  10 mL Intravenous PRN Jono Houston MD        vancomycin 3000 mg/500 mL 0.9% NS IVPB ([facility])  20 mg/kg Intravenous Once Jono Houston MD         Current Outpatient Medications Ordered in Epic   Medication Sig Dispense Refill    furosemide (LASIX) 40 MG tablet Take 1 tablet by mouth 2 (Two) Times a Day. 180 tablet 3    lisinopril (PRINIVIL,ZESTRIL) 40 MG tablet Take 1 tablet by mouth Daily. 90 tablet 3    metoprolol tartrate (LOPRESSOR) 50 MG tablet Take 1 tablet by mouth 2 (Two) Times a Day. 180 tablet 3         PROCEDURES  Procedures    Total critical care time: Approximately 40 minutes    Due to a high probability of clinically significant, life threatening deterioration, the patient required my highest level of preparedness to intervene emergently and I personally spent this critical care time directly and personally managing the patient. This critical care time included obtaining a history; examining the patient; vital sign monitoring; ordering and review of studies; arranging urgent treatment with development of a management plan; evaluation of patient's response to treatment; frequent reassessment; and, discussions with other providers.    This critical care time was performed to assess and manage the high probability of imminent, life-threatening deterioration that could result in multi-organ failure. It was exclusive of separately billable procedures and treating other patients and teaching time.    Please see MDM section and the rest of the note for further information on patient assessment and treatment.      PROGRESS, DATA ANALYSIS, CONSULTS, AND MEDICAL DECISION MAKING  All labs have been independently interpreted by me.  All radiology studies have  been reviewed by me. All EKG's have been independently viewed and interpreted by me.  Discussion below represents my analysis of pertinent findings related to patient's condition, differential diagnosis, treatment plan and final disposition.    Differential diagnosis:   My diagnosis for lower extremity pain and injury includes but is not limited to hip fracture, femur fracture, hip dislocation, hip contusion, hip sprain, hip strain, pelvic fracture, ischio-tibial band pain, ischio-tibial band bursitis, knee sprain, patella dislocation, knee dislocation, internal derangement of knee, fractures of the femur, tibia, fibula, ankle, foot and digits, ankle sprain, ankle dislocation, Lisfranc fracture, fracture dislocations of the digits, pulmonary embolism, claudication, peripheral vascular disease, gout, osteoarthritis, rheumatoid arthritis, bursitis, septic joint, poly-rheumatica, polyarthralgia and other inflammatory or infectious disease processes.      Clinical Scores:                  ED Course as of 02/10/24 0434   Sat Feb 10, 2024   0232 Patient will almost certainly need to stay in the hospital for IV diuretics and further evaluation of the possibility of osteomyelitis related to the right lower extremity wound.  Initiate laboratory and radiologic evaluation.  Patient agreeable. 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  Soft tissue deformity identified but no other bony erosion appreciated.  I independently viewed and interpreted these images contemporaneously with treatment.    [RS]   0300  RADIOLOGY      Study: Single view chest  Findings: Cardiomegaly with some blunting of the left costophrenic angle  I independently viewed and interpreted these images contemporaneously with treatment.    [RS]   0415 Sed Rate(!): 19 [RS]   0416 Lactate(!!): 2.2 [RS]   0416 proBNP(!): 3,398.0 [RS]   0416 BUN(!): 40 [RS]   0416 Creatinine(!): 1.28 [RS]   0416 Sodium: 138 [RS]   0416 Potassium: 5.0 [RS]   0416 AST (SGOT)(!): 68 [RS]   0416 Alkaline Phosphatase(!): 145 [RS]   0416 Total Bilirubin(!): 5.4 [RS]   0416 C-Reactive Protein(!): 2.52 [RS]   0416 eGFR: 71.2 [RS]   0416 HS Troponin T(!!): 88 [RS]   0417 WBC: 6.08 [RS]   0417 Hemoglobin: 16.8 [RS]   0417 Platelets: 194 [RS]   0417 Patient clearly volume overloaded.  Given the space that we have with his kidneys we will initiate furosemide.  Blood cultures will be drawn and antibiotics were initiated for probable right lower extremity wound infection causing sepsis. [RS]   0427 Reviewed all findings with patient.  Agreeable with admission plan.  Requesting some pain medication after wound cleansing as he has more pain at this time. [RS]   0433 CONSULT        Provider: MYRIAM Nolasco    Discussion: Reviewed patient history, ED presentation and evaluation as well as therapies initiated in the ED and response to therapies.  Agreeable to accept the patient for admission on behalf of Dr. Latham.    Agreeable c treatment and planned disposition.         [RS]      ED Course User Index  [RS] Jono Houston MD         Prescription drug monitoring program review: NA    AS OF 04:34 EST VITALS:    BP - 122/67  HR - 114  TEMP - 97.8 °F (36.6 °C) (Oral)  O2 SATS - 92%    COMPLEXITY OF CARE  The patient requires admission.      DIAGNOSIS  Final diagnoses:   Anasarca   Open wound-right lower extremity   Acute kidney injury (DIEGO) with acute tubular necrosis (ATN)   Elevated troponin   Sepsis, due to unspecified organism, unspecified whether acute organ dysfunction present  "  Hyperbilirubinemia         DISPOSITION  ED Disposition       ED Disposition   Decision to Admit    Condition   --    Comment   Level of Care: Telemetry [5]   Diagnosis: Anasarca [[id number]]   Admitting Physician: BAILEY PIPER [[id number]]   Certification: I Certify That Inpatient Hospital Services Are Medically Necessary For Greater Than 2 Midnights                    ADMISSION    Discussed treatment plan and reason for admission with pt/family and admitting physician.  Pt/family voiced understanding of the plan for admission for further testing/treatment as needed.       Please note that portions of this document were completed with a voice recognition program.    Note Disclaimer: At [facility], we believe that sharing information builds trust and better relationships. You are receiving this note because you recently visited [facility]. It is possible you will see health information before a provider has talked with you about it. This kind of information can be easy to misunderstand. To help you fully understand what it means for your health, we urge you to discuss this note with your provider.         Jono Houston MD  02/10/24 0434      Electronically signed by Jono Houston MD at 02/10/24 0434       Luisa Ayala, RN at 02/10/24 0029          To ER via PV.  Large open wound to rt lateral ankle.  Pt states it is a scar from an old injury that has opened up.  States bleeding at times.  Pt states \"I hold water\".  C/o penile swelling. States testicles are not swollen.  Pt takes Lasix BID and states he has been compliant.     Electronically signed by Luisa Ayala, RN at 02/10/24 0036       Oxygen Therapy (since admission)       Date/Time SpO2 Device (Oxygen Therapy) Flow (L/min) Oxygen Concentration (%) ETCO2 (mmHg)    02/12/24 0852 95 nasal cannula 2 -- --    02/12/24 0757 98 room air -- -- --    02/11/24 0310 92 -- -- -- --    02/11/24 2000 -- room air -- -- --    02/11/24 1929 94 -- -- -- --    " 02/11/24 1535 -- room air -- -- --    02/11/24 0740 100 -- -- -- --    02/11/24 0015 -- nasal cannula 2 -- --    02/10/24 2300 93 nasal cannula 2 -- --    02/10/24 2037 -- nasal cannula 2 -- --    02/10/24 2021 100 nasal cannula 2 -- --    02/10/24 1420 -- nasal cannula 2 -- --    02/10/24 1000 -- nasal cannula 2 -- --    02/10/24 0850 -- room air -- -- --    02/10/24 0759 93 -- -- -- --    02/10/24 0703 96 -- -- -- --    02/10/24 0501 93 -- -- -- --    02/10/24 0034 92 room air -- -- --    02/10/24 0018 89 room air -- -- --          Intake & Output (last 3 days)         02/09 0701  02/10 0700 02/10 0701 02/11 0700 02/11 0701 02/12 0700 02/12 0701  02/13 0700    P.O.   462     Total Intake(mL/kg)   462 (3.2)     Urine (mL/kg/hr)  5150 (1.5) 5000 (1.4)     Total Output  5150 5000     Net  -5150 -[phone]             Stool Unmeasured Occurrence    0 x           Lines, Drains & Airways       Active LDAs       Name Placement date Placement time Site Days    Peripheral IV 02/11/24 0036 Anterior;Right Forearm 02/11/24  0036  Forearm  1              Inactive LDAs       Name Placement date Placement time Removal date Removal time Site Days    [REMOVED] Peripheral IV 02/10/24 0312 Left Antecubital 02/10/24  0312  02/10/24  2315  Antecubital  less than 1                  Medication Administration Report for Richard Rodrigez as of 02/12/24 1013     Legend:    Given Hold Not Given Due Canceled Entry Other Actions    Time Time (Time) Time Time-Action         Discontinued     Completed     Future     MAR Hold     Linked             Medications 02/10/24 02/11/24 02/12/24      acetaminophen (TYLENOL) tablet 650 mg  Dose: 650 mg  Freq: Every 4 Hours PRN Route: PO  PRN Reason: Mild Pain  Start: 02/10/24 0433   Admin Instructions:   If given for fever, use fever parameter: fever greater than 100.4 °F  Based on patient request - if ordered for moderate or severe pain, provider allows for administration of a medication prescribed  for a lower pain scale.    Do not exceed 4 grams of acetaminophen in a 24 hr period. Max dose of 2gm for AST/ALT greater than 120 units/L.    If given for pain, use the following pain scale:   Mild Pain = Pain Score of 1-3, CPOT 1-2  Moderate Pain = Pain Score of 4-6, CPOT 3-4  Severe Pain = Pain Score of 7-10, CPOT 5-8    1026-Given          0015-Given     0614-Given         0839-MAR Hold            Or  acetaminophen (TYLENOL) 160 MG/5ML oral solution 650 mg  Dose: 650 mg  Freq: Every 4 Hours PRN Route: PO  PRN Reason: Mild Pain  Start: 02/10/24 0433   Admin Instructions:   If given for fever, use fever parameter: fever greater than 100.4 °F  Based on patient request - if ordered for moderate or severe pain, provider allows for administration of a medication prescribed for a lower pain scale.    Do not exceed 4 grams of acetaminophen in a 24 hr period. Max dose of 2gm for AST/ALT greater than 120 units/L.    If given for pain, use the following pain scale:   Mild Pain = Pain Score of 1-3, CPOT 1-2  Moderate Pain = Pain Score of 4-6, CPOT 3-4  Severe Pain = Pain Score of 7-10, CPOT 5-8    1026-Not Given:  See Alt          0015-Not Given:  See Alt     0614-Not Given:  See Alt         0839-MAR Hold            Or  acetaminophen (TYLENOL) suppository 650 mg  Dose: 650 mg  Freq: Every 4 Hours PRN Route: RE  PRN Reason: Mild Pain  Start: 02/10/24 0433   Admin Instructions:   If given for fever, use fever parameter: fever greater than 100.4 °F  Based on patient request - if ordered for moderate or severe pain, provider allows for administration of a medication prescribed for a lower pain scale.    Do not exceed 4 grams of acetaminophen in a 24 hr period. Max dose of 2gm for AST/ALT greater than 120 units/L.    If given for pain, use the following pain scale:   Mild Pain = Pain Score of 1-3, CPOT 1-2  Moderate Pain = Pain Score of 4-6, CPOT 3-4  Severe Pain = Pain Score of 7-10, CPOT 5-8    1026-Not Given:  See Alt           0015-Not Given:  See Alt     0614-Not Given:  See Alt         0839-MAR Hold             sennosides-docusate (PERICOLACE) 8.6-50 MG per tablet 2 tablet  Dose: 2 tablet  Freq: 2 Times Daily PRN Route: PO  PRN Reason: Constipation  Start: 02/10/24 0434   Admin Instructions:   Start bowel management regimen if patient has not had a bowel movement after 12 hours.      0839-MAR Hold            And  polyethylene glycol (MIRALAX) packet 17 g  Dose: 17 g  Freq: Daily PRN Route: PO  PRN Reason: Constipation  PRN Comment: Use if senna-docusate is ineffective  Start: 02/10/24 0434   Admin Instructions:   Use if no bowel movement after 12 hours. Mix in 6-8 ounces of water.  Use 4-8 ounces of water, tea, or juice for each 17 gram dose.      0839-MAR Hold            And  bisacodyl (DULCOLAX) EC tablet 5 mg  Dose: 5 mg  Freq: Daily PRN Route: PO  PRN Reason: Constipation  PRN Comment: Use if polyethylene glycol is ineffective  Start: 02/10/24 0434   Admin Instructions:   Use if no bowel movement after 12 hours.  Swallow whole. Do not crush, split, or chew tablet.      0839-MAR Hold            And  bisacodyl (DULCOLAX) suppository 10 mg  Dose: 10 mg  Freq: Daily PRN Route: RE  PRN Reason: Constipation  PRN Comment: Use if bisacodyl oral is ineffective  Start: 02/10/24 0434   Admin Instructions:   Use if no bowel movement after 12 hours.  Hold for diarrhea      0839-MAR Hold             calcium carbonate (TUMS) chewable tablet 500 mg (200 mg elemental)  Dose: 2 tablet  Freq: 2 Times Daily PRN Route: PO  PRN Reason: Heartburn  Start: 02/10/24 0434   Admin Instructions:   One tablet contains 200 mg elemental calcium.  Take with food.      0051-Given     0839-MAR Hold            cefTRIAXone (ROCEPHIN) 2,000 mg in sodium chloride 0.9 % 100 mL IVPB-VTB  Dose: 2,000 mg  Freq: Every 24 Hours Route: IV  Indications of Use: SKIN AND SOFT TISSUE INFECTION  Start: 02/11/24 0430   End: 02/16/24 0429   Admin Instructions:   LR should be paused  and flushing of the line with NS is recommended prior to and after completion of ceftriaxone infusion due to incompatibility. Do not co-adminster with calcium-containing solutions.  Caution: Look alike/sound alike drug alert     0511-New Bag          0521-New Bag             fentaNYL citrate (PF) (SUBLIMAZE) injection 50 mcg  Dose: 50 mcg  Freq: Every 10 Minutes PRN Route: IV  PRN Reason: Severe Pain  Start: 02/12/24 0939   Admin Instructions:   Maximum total dose of fentanyl is 100 mcg.  If given for pain, use the following pain scale:  Mild Pain = Pain Score of 1-3, CPOT 1-2  Moderate Pain = Pain Score of 4-6, CPOT 3-4  Severe Pain = Pain Score of 7-10, CPOT 5-8      0950-Given             lactated ringers infusion  Rate: 9 mL/hr Dose: 9 mL/hr  Freq: Continuous Route: IV  Start: 02/12/24 0941      0950-New Bag             lisinopril (PRINIVIL,ZESTRIL) tablet 10 mg  Dose: 10 mg  Freq: Daily Route: PO  Start: 02/12/24 0900   Admin Instructions:   Hold for SBP less than 100, DBP less than 60      0824-Given             metoprolol tartrate (LOPRESSOR) tablet 50 mg  Dose: 50 mg  Freq: 2 Times Daily Route: PO  Start: 02/10/24 1145   Admin Instructions:   Hold for SBP less than 100, DBP less than 60, or heart rate less than 50. If a dose is held, please contact the provider.    1406-Given     2035-Given         1535-Given     2024-Given         0824-Given     2100            midazolam (VERSED) injection 1 mg  Dose: 1 mg  Freq: Every 10 Minutes PRN Route: IV  PRN Comment: Anxiety prophylaxis, Pre-op comfort  Start: 02/12/24 0939   Admin Instructions:   May repeat dose in 10 minutes one time then contact provider for additional orders.            nitroglycerin (NITROSTAT) SL tablet 0.4 mg  Dose: 0.4 mg  Freq: Every 5 Minutes PRN Route: SL  PRN Reason: Chest Pain  PRN Comment: Only if SBP Greater Than 100  Start: 02/10/24 0433   Admin Instructions:   If Pain Unrelieved After 3 Doses Notify MD  May administer up to 3 doses  per episode.      0839-MAR Hold             ondansetron ODT (ZOFRAN-ODT) disintegrating tablet 4 mg  Dose: 4 mg  Freq: Every 6 Hours PRN Route: PO  PRN Reasons: Nausea,Vomiting  Start: 02/10/24 0434   Admin Instructions:   If BOTH ondansetron (ZOFRAN) and promethazine (PHENERGAN) are ordered use ondansetron first and THEN promethazine IF ondansetron is ineffective.  Place on tongue and allow to dissolve.      0839-MAR Hold            Or  ondansetron (ZOFRAN) injection 4 mg  Dose: 4 mg  Freq: Every 6 Hours PRN Route: IV  PRN Reasons: Nausea,Vomiting  Start: 02/10/24 0434   Admin Instructions:   If BOTH ondansetron (ZOFRAN) and promethazine (PHENERGAN) are ordered use ondansetron first and THEN promethazine IF ondansetron is ineffective.      0839-MAR Hold             Pharmacy to dose vancomycin  Freq: Continuous PRN Route: XX  PRN Reason: Consult  Indications of Use: SKIN AND SOFT TISSUE INFECTION  Start: 02/10/24 0435   End: 02/15/24 0434          sodium chloride 0.9 % flush 10 mL  Dose: 10 mL  Freq: As Needed Route: IV  PRN Reason: Line Care  Start: 02/10/24 0433      0839-MAR Hold             sodium chloride 0.9 % flush 10 mL  Dose: 10 mL  Freq: Every 12 Hours Scheduled Route: IV  Start: 02/10/24 0900    1018-Given     2035-Given         1538-Given     2024-Given         0839-MAR Hold     0900-MAR Hold     2100-MAR Hold           sodium chloride 0.9 % flush 10 mL  Dose: 10 mL  Freq: As Needed Route: IV  PRN Reason: Line Care  Start: 02/10/24 0226      0839-MAR Hold             sodium chloride 0.9 % flush 3 mL  Dose: 3 mL  Freq: Every 12 Hours Scheduled Route: IV  Start: 02/12/24 0941      0941     2100            sodium chloride 0.9 % flush 3-10 mL  Dose: 3-10 mL  Freq: As Needed Route: IV  PRN Reason: Line Care  Start: 02/12/24 0939          sodium chloride 0.9 % infusion 40 mL  Dose: 40 mL  Freq: As Needed Route: IV  PRN Reason: Line Care  Start: 02/12/24 0939   Admin Instructions:   Following administration of  an IV intermittent medication, flush line with 40mL NS at 100mL/hr.          sodium chloride 0.9 % infusion 40 mL  Dose: 40 mL  Freq: As Needed Route: IV  PRN Reason: Line Care  Start: 02/10/24 0433   Admin Instructions:   Following administration of an IV intermittent medication, flush line with 40mL NS at 100mL/hr.      0839-MAR Hold             sodium hypochlorite (DAKIN'S 1/4 STRENGTH) 0.125 % topical solution 0.125% solution  Freq: Every 12 Hours Route: TOP  Start: 02/10/24 2100   Admin Instructions:   Apply to right ankle wound    2035-Given          (1537)-Not Given     2024-Given         0839-MAR Hold     0900-MAR Hold     2100-MAR Hold           traMADol (ULTRAM) tablet 50 mg  Dose: 50 mg  Freq: Every 8 Hours PRN Route: PO  PRN Reason: Moderate Pain  Start: 02/11/24 0746   End: 02/16/24 0745   Admin Instructions:   Based on patient request - if ordered for moderate or severe pain, provider allows for administration of a medication prescribed for a lower pain scale.      Caution: Look alike/sound alike drug alert    If given for pain, use the following pain scale:  Mild Pain = Pain Score of 1-3, CPOT 1-2  Moderate Pain = Pain Score of 4-6, CPOT 3-4  Severe Pain = Pain Score of 7-10, CPOT 5-8     1820-Given          0732-Given     0839-MAR Hold            vancomycin 1250 mg/250 mL 0.9% NS IVPB ([facility])  Dose: 1,250 mg  Freq: Every 12 Hours Route: IV  Indications of Use: SKIN AND SOFT TISSUE INFECTION  Start: 02/11/24 1100   End: 02/15/24 1559     1536-New Bag     (1537)-Not Given         0420-New Bag     1600           Completed Medications  Medications 02/10/24 02/11/24 02/12/24       acetaminophen (TYLENOL) tablet 1,000 mg  Dose: 1,000 mg  Freq: Once Route: PO  Start: 02/12/24 0941   End: 02/12/24 0950   Admin Instructions:   Based on patient request - if ordered for moderate or severe pain, provider allows for administration of a medication prescribed for a lower pain scale.    Do not exceed 4 grams of  acetaminophen in a 24 hr period. Max dose of 2gm for AST/ALT greater than 120 units/L.    If given for pain, use the following pain scale:   Mild Pain = Pain Score of 1-3, CPOT 1-2  Moderate Pain = Pain Score of 4-6, CPOT 3-4  Severe Pain = Pain Score of 7-10, CPOT 5-8      0950-Given             aspirin chewable tablet 324 mg  Dose: 324 mg  Freq: Once Route: PO  Start: 02/10/24 0432   End: 02/10/24 0635   Admin Instructions:   Herbal/drug interaction: Avoid use with ginkgo biloba. Based on patient request - if ordered for moderate or severe pain, provider allows for administration of a medication prescribed for a lower pain scale.  Do not exceed 4 grams of aspirin in a 24 hr period.    If given for pain, use the following pain scale:   Mild Pain = Pain Score of 1-3, CPOT 1-2  Moderate Pain = Pain Score of 4-6, CPOT 3-4  Severe Pain = Pain Score of 7-10, CPOT 5-8    0635-Given               cefTRIAXone (ROCEPHIN) 2,000 mg in sodium chloride 0.9 % 100 mL IVPB-VTB  Dose: 2,000 mg  Freq: Once Route: IV  Indications of Use: SKIN AND SOFT TISSUE INFECTION  Start: 02/10/24 0433   End: 02/10/24 1048   Admin Instructions:   LR should be paused and flushing of the line with NS is recommended prior to and after completion of ceftriaxone infusion due to incompatibility. Do not co-adminster with calcium-containing solutions.  Caution: Look alike/sound alike drug alert    1018-New Bag               celecoxib (CeleBREX) capsule 100 mg  Dose: 100 mg  Freq: Once Route: PO  Start: 02/12/24 0941   End: 02/12/24 0950   Admin Instructions:   If given for pain, use the following pain scale:  Mild Pain = Pain Score of 1-3, CPOT 1-2  Moderate Pain = Pain Score of 4-6, CPOT 3-4  Severe Pain = Pain Score of 7-10, CPOT 5-8      0950-Given             famotidine (PEPCID) injection 20 mg  Dose: 20 mg  Freq: Once Route: IV  Start: 02/12/24 0941   End: 02/12/24 0950   Admin Instructions:   Give IV push over 2 minutes.      0950-Given              furosemide (LASIX) injection 40 mg  Dose: 40 mg  Freq: Every 8 Hours Route: IV  Start: 02/11/24 1145   End: 02/11/24 2024     1535-Given     2024-Given             furosemide (LASIX) injection 40 mg  Dose: 40 mg  Freq: Every 8 Hours Route: IV  Start: 02/10/24 1430   End: 02/10/24 2304    1406-Given     2304-Given              furosemide (LASIX) injection 80 mg  Dose: 80 mg  Freq: Once Route: IV  Start: 02/10/24 0433   End: 02/10/24 0631   Admin Instructions:   Hold for SBP less than 100, DBP less than 60    0631-Given               morphine injection 4 mg  Dose: 4 mg  Freq: Once Route: IV  Start: 02/10/24 0443   End: 02/10/24 0636   Admin Instructions:   Based on patient request - if ordered for moderate or severe pain, provider allows for administration of a medication prescribed for a lower pain scale.  If given for pain, use the following pain scale:  Mild Pain = Pain Score of 1-3, CPOT 1-2  Moderate Pain = Pain Score of 4-6, CPOT 3-4  Severe Pain = Pain Score of 7-10, CPOT 5-8    0636-Given               perflutren (DEFINITY) 8.476 mg in Sodium Chloride (PF) 0.9 % 10 mL injection  Dose: 3 mL  Freq: Once in Imaging Route: IV  Start: 02/11/24 1245   End: 02/11/24 1153   Admin Instructions:   Mix 1.3 mL of mechanically activated Definity with 8.7 mL of normal saline. A total of 3 mL of the resulting Definity solution was administered.     1153-Given              vancomycin 3000 mg/500 mL 0.9% NS IVPB ([facility])  Dose: 20 mg/kg  Weight Dosing Info: 145 kg  Freq: Once Route: IV  Indications of Use: SKIN AND SOFT TISSUE INFECTION  Start: 02/10/24 0433   End: 02/10/24 1428    1128-New Bag [C]              Discontinued Medications  Medications 02/10/24 02/11/24 02/12/24       ceFAZolin in dextrose (ANCEF) IVPB solution 2 g  Dose: 2 g  Freq: Once Route: IV  Indications of Use: PERIOPERATIVE PHARMACOPROPHYLAXIS  Start: 02/12/24 0842   End: 02/12/24 0842   Admin Instructions:   Administer within 1 hour of surgical incision.  Redose 4 hours from pre-op dose if procedure ongoing or >1.5 L blood loss.  Caution: Look alike/sound alike drug alert      0842             ceFAZolin in Sodium Chloride (ANCEF) IVPB solution 3,000 mg  Dose: 3,000 mg  Freq: Once Route: IV  Indications of Use: PERIOPERATIVE PHARMACOPROPHYLAXIS  Start: 02/12/24 0845   End: 02/12/24 0850   Admin Instructions:   Administer within 1 hour of surgical incision. Redose 4 hours from pre-op dose if procedure ongoing or >1.5 L blood loss.  Caution: Look alike/sound alike drug alert. Refrigerate      0845             ciprofloxacin (CILOXAN) 0.3 % ophthalmic solution 2 drop  Dose: 2 drop  Freq: Every 2 Hours While Awake Route: Both Eyes  Start: 02/10/24 1400   End: 02/11/24 1054   Admin Instructions:   2 drops in both eyes every 2 hours while awake for 2 days, then every 4 hours x 5 days    1406-Given     1655-Given     1800-Given       2035-Given     2202-Given         0610-Given     1538-Canceled Entry     1539-Canceled Entry           Followed by  ciprofloxacin (CILOXAN) 0.3 % ophthalmic solution 2 drop  Dose: 2 drop  Freq: Every 4 Hours While Awake Route: Both Eyes  Start: 02/12/24 1400   End: 02/11/24 1054          ciprofloxacin (CILOXAN) 0.3 % ophthalmic solution 2 drop  Dose: 2 drop  Freq: Every 2 Hours While Awake Route: Both Eyes  Start: 02/10/24 1400   End: 02/10/24 1304   Admin Instructions:   2 drops in both eyes every 2 hours while awake for 2 days, then every 4 hours x 5 days          gadobenate dimeglumine (MULTIHANCE) injection 20 mL  Dose: 20 mL  Freq: Once in Imaging Route: IV  Start: 02/11/24 1430   End: 02/11/24 1625   Admin Instructions:   Vesicant; admin as rapid bolus; flush with 5 mL NS after admin or 20 mL for renal or aortoiliofemoral vasculature     1430              lisinopril (PRINIVIL,ZESTRIL) tablet 40 mg  Dose: 40 mg  Freq: Daily Route: PO  Start: 02/10/24 1145   End: 02/11/24 1352   Admin Instructions:   Hold for SBP less than 100, DBP less than  60    1406-Given          (1539)-Not Given              vancomycin (VANCOCIN) 1000 mg/200 mL dextrose 5% IVPB  Dose: 1,000 mg  Freq: Every 12 Hours Route: IV  Indications of Use: SKIN AND SOFT TISSUE INFECTION  Start: 02/10/24 2300   End: 24-New Bag                          Blood Administration Record (From admission, onward)      None          Operative/Procedure Notes (all)    No notes of this type exist for this encounter.          Physician Progress Notes (all)        Michele Anaya MD at [phone]            Name: Richard Rodrigez ADMIT: 2/10/2024   : 1980  PCP: Jessie Almodovar III NP-C    MRN: [id number] LOS: 2 days   AGE/SEX: 44 y.o. male  ROOM: [address]    [place], Subsequent Hospital Care    Chief Complaint   Patient presents with    Wound Check    Groin Swelling     CC: Acute right venous hemorrhage  Subjective     44 y.o. male with right leg capillary hemorrhage with squirting blood after dressing taken down.  Patient reports that every time he stood in the last 24 to 48 hours he soaked his dressing with blood.  The site is clearly a venous source that has been unroofed by the ulcer near it.  At the 12:00 within the 7 x 7 cm ulcerated area.  We were able to control it with direct pressure and agree he is seeing it but he is not able to stand and the pressure from the [place] will be difficult to tolerate.  I am going to make him an emergency debridement and directed phlebectomy with ligation of bleeding site.  He will need to transfer to 5 E. postoperatively.    Review of Systems planes of pain in the leg    Objective     Scheduled Medications:   cefTRIAXone, 2,000 mg, Intravenous, Q24H  lisinopril, 10 mg, Oral, Daily  metoprolol tartrate, 50 mg, Oral, BID  sodium chloride, 10 mL, Intravenous, Q12H  sodium hypochlorite, , Topical, Q12H  vancomycin, 1,250 mg, Intravenous, Q12H        Active Infusions:  Pharmacy to dose vancomycin,  "        As Needed Medications:    acetaminophen **OR** acetaminophen **OR** acetaminophen    senna-docusate sodium **AND** polyethylene glycol **AND** bisacodyl **AND** bisacodyl    calcium carbonate    nitroglycerin    ondansetron ODT **OR** ondansetron    Pharmacy to dose vancomycin    [COMPLETED] Insert Peripheral IV **AND** sodium chloride    sodium chloride    sodium chloride    traMADol    Vital Signs  Vital Signs Patient Vitals for the past 24 hrs:   BP Temp Temp src Pulse Resp SpO2 Height Weight   02/11/24 2350 111/63 97.7 °F (36.5 °C) Oral 73 18 92 % -- --   02/11/24 1929 118/66 97.8 °F (36.6 °C) Oral 78 18 94 % -- --   02/11/24 1545 129/77 97.7 °F (36.5 °C) Oral 91 18 -- -- --   02/11/24 1150 109/67 -- -- 83 -- -- 182.9 cm (72\") (!) 145 kg (320 lb)   02/11/24 0740 109/67 97.5 °F (36.4 °C) Oral 88 20 100 % -- --     Vital Signs (range)  Temp:  [97.5 °F (36.4 °C)-97.8 °F (36.6 °C)] 97.7 °F (36.5 °C)  Heart Rate:  [73-91] 73  Resp:  [18-20] 18  BP: (109-129)/(63-77) 111/63  I/O:  I/O last 3 completed shifts:  In: 462 [P.O.:462]  Out: 7325 [Urine:7325]  I/O:   Intake/Output Summary (Last 24 hours) at 2/12/2024 0711  Last data filed at 2/11/2024 2350  Gross per 24 hour   Intake 462 ml   Output 5000 ml   Net -4538 ml     BMI:  Body mass index is 43.4 kg/m².    Physical Exam:  Physical Exam   Lungs coarse  Extremities severely swollen  Abdomen obese but benign  Right leg wound with nonpulsatile continuous bleeding from the site of the venous hemorrhage/capillary source.  Site of the bleed is at 12:00 within the wound  Results Review:     CBC    Results from last 7 days   Lab Units 02/12/24  0428 02/11/24  0601 02/10/24  0711 02/10/24  0311   WBC 10*3/mm3 5.33 6.09 6.50 6.08   HEMOGLOBIN g/dL 16.1 16.7 16.5 16.8   PLATELETS 10*3/mm3 174 161 202 194     BMP   Results from last 7 days   Lab Units 02/12/24  0428 02/11/24  0805 02/10/24  0711 02/10/24  0311   SODIUM mmol/L 137 141 139 138   POTASSIUM mmol/L 4.8 4.5 " "5.0 5.0   CHLORIDE mmol/L 94* 95* 98 96*   CO2 mmol/L 34.6* 36.7* 30.5* 32.8*   BUN mg/dL 23* 27* 39* 40*   CREATININE mg/dL 0.92 1.00 1.22 1.28*   GLUCOSE mg/dL 93 86 76 65   MAGNESIUM mg/dL  --   --   --  2.2     Cr Clearance Estimated Creatinine Clearance: 153.8 mL/min (by C-G formula based on SCr of 0.92 mg/dL).  Coag     HbA1C   Lab Results   Component Value Date    HGBA1C 6.30 (H) 02/10/2024    HGBA1C 6.40 (H) 12/07/2021     Blood Glucose No results found for: \"POCGLU\"  Infection   Results from last 7 days   Lab Units 02/10/24  0859 02/10/24  0711 02/10/24  0314 02/10/24  0311   BLOODCX  No growth at 24 hours No growth at 24 hours  --   --    WOUNDCX   --   --  Light growth (2+) Gram Negative Bacilli*  Moderate growth (3+) Normal Skin Vanessa  --    PROCALCITONIN ng/mL  --   --   --  0.37*     CMP   Results from last 7 days   Lab Units 02/12/24  0428 02/11/24  0805 02/10/24  0711 02/10/24  0311   SODIUM mmol/L 137 141 139 138   POTASSIUM mmol/L 4.8 4.5 5.0 5.0   CHLORIDE mmol/L 94* 95* 98 96*   CO2 mmol/L 34.6* 36.7* 30.5* 32.8*   BUN mg/dL 23* 27* 39* 40*   CREATININE mg/dL 0.92 1.00 1.22 1.28*   GLUCOSE mg/dL 93 86 76 65   ALBUMIN g/dL  --   --   --  3.1*  3.0*   BILIRUBIN mg/dL  --   --   --  5.5*  5.4*   ALK PHOS U/L  --   --   --  142*  145*   AST (SGOT) U/L  --   --   --  68*  68*   ALT (SGPT) U/L  --   --   --  35  35     Radiology(recent) No radiology results for the last day    Assessment & Plan     Assessment & Plan      Anasarca    Morbid (severe) obesity    Tobacco use disorder    Essential hypertension    Suspected sleep apnea    Obesity hypoventilation syndrome    Pulmonary hypertension    Wound infection    Bacterial conjunctivitis    Prediabetes    Diastolic CHF, acute    Abrasion of ankle with infection, right, initial encounter    Cellulitis of right ankle      44 y.o. male with likely significant venous hypertension in his leg.  Noninvasive testing here is really on the helpful as there " is no reflux study done.  He cannot stand or even sit because of the venous bleeding in his leg and now it is acutely bled during our wound change.  He will need operative debridement phlebectomy and ligation of this area.  We will declare this in urgent to emergent situation and involve our own cases as needed.      Michele Anaya MD  24  07:10 EST    Please call my office with any question: [phone]    Active Hospital Problems    Diagnosis  POA    **Anasarca [R60.1]  Yes    Wound infection [T14.8XXA, L08.9]  Yes    Bacterial conjunctivitis [H10.9]  Yes    Prediabetes [R73.03]  Yes    Diastolic CHF, acute [I50.31]  Yes    Abrasion of ankle with infection, right, initial encounter [S90.511A, L08.9]  Yes    Cellulitis of right ankle [L03.115]  Yes    Pulmonary hypertension [I27.20]  Yes    Suspected sleep apnea [R29.818]  Yes    Obesity hypoventilation syndrome [E66.2]  Yes    Morbid (severe) obesity [E66.01]  Yes    Tobacco use disorder [F17.200]  Yes    Essential hypertension [I10]  Yes      Resolved Hospital Problems   No resolved problems to display.            Electronically signed by Michele Anaya MD at 24 0750       Manolo Silvestre MD at 24 1346              [facility] Medicine Services  PROGRESS NOTE    Patient Name: Richard Rodrigez  : 1980  MRN: [id number]    Date of Admission: 2/10/2024  Primary Care Physician: Jessie Almodovar III, NP-C    Subjective   Subjective     CC:  Follow up leg wound    S:  Patient feels much better today his leg pain is starting to improve.  He is feeling less generally weak.  He is awaiting MRI.  No other new complaints.    Review of Systems  No current fevers or chills  No current shortness of breath or cough  No current nausea, vomiting, or diarrhea  No current chest pain or palpitations       Objective   Objective     Vital Signs:   Temp:  [97.5 °F (36.4 °C)-98.1 °F (36.7 °C)] 97.5 °F (36.4 °C)  Heart Rate:   [74-91] 83  Resp:  [18-20] 20  BP: (109-150)/(64-95) 109/67        Physical Exam:  Constitutional: Awake, alert  Eyes: PERRLA, sclerae anicteric, no conjunctival injection  HENT: NCAT, mucous membranes moist  Neck: Supple, no thyromegaly, no lymphadenopathy, trachea midline  Respiratory: No cough or wheezes, normal inspiration, nonlabored respirations   Cardiovascular: Pulse rate is tachycardic initially but improved, palpable radial pulses bilaterally  Gastrointestinal: Morbidly obese, soft, nontender, nondistended  Musculoskeletal: Severely obese, BMI is 43, pitting bilateral lower extremity edema  Psychiatric: Appropriate affect, cooperative, conversational  Neurologic: No slurred speech or facial droop, follows commands  Skin: Chronic venous stasis changes noted, right ankle ulceration with resolving purulent drainage,  No rashes or jaundice, warm    Results Reviewed:  Results from last 7 days   Lab Units 02/11/24  0601 02/10/24  0711 02/10/24  0311   WBC 10*3/mm3 6.09 6.50 6.08   HEMOGLOBIN g/dL 16.7 16.5 16.8   HEMATOCRIT % 55.1* 55.4* 56.6*   PLATELETS 10*3/mm3 161 202 194   PROCALCITONIN ng/mL  --   --  0.37*     Results from last 7 days   Lab Units 02/11/24  0805 02/10/24  0711 02/10/24  0311   SODIUM mmol/L 141 139 138   POTASSIUM mmol/L 4.5 5.0 5.0   CHLORIDE mmol/L 95* 98 96*   CO2 mmol/L 36.7* 30.5* 32.8*   BUN mg/dL 27* 39* 40*   CREATININE mg/dL 1.00 1.22 1.28*   GLUCOSE mg/dL 86 76 65   CALCIUM mg/dL 8.4* 9.0 9.3   ALK PHOS U/L  --   --  142*  145*   ALT (SGPT) U/L  --   --  35  35   AST (SGOT) U/L  --   --  68*  68*   HSTROP T ng/L  --  76* 88*   PROBNP pg/mL  --   --  3,398.0*     Estimated Creatinine Clearance: 141.5 mL/min (by C-G formula based on SCr of 1 mg/dL).    Microbiology Results Abnormal       Procedure Component Value - Date/Time    Blood Culture - Blood, Arm, Left [[id number]]  (Normal) Collected: 02/10/24 0859    Lab Status: Preliminary result Specimen: Blood from Arm, Left Updated:  02/11/24 0915     Blood Culture No growth at 24 hours    Blood Culture - Blood, Arm, Right [[id number]]  (Normal) Collected: 02/10/24 0711    Lab Status: Preliminary result Specimen: Blood from Arm, Right Updated: 02/11/24 0730     Blood Culture No growth at 24 hours            Imaging Results (Last 24 Hours)       Procedure Component Value Units Date/Time    MRI Ankle Right With & Without Contrast [[id number]] Resulted: 02/11/24 1343     Updated: 02/11/24 1343            Results for orders placed during the hospital encounter of 12/07/21    Adult Transthoracic Echo Complete W/ Cont if Necessary Per Protocol    Interpretation Summary  · Left ventricular wall thickness is consistent with mild concentric hypertrophy.  · Estimated left ventricular EF = 57% Left ventricular systolic function is normal.  · Left ventricular diastolic function was indeterminate.  · Mildly reduced right ventricular systolic function noted.      I have reviewed the medications:  Scheduled Meds:cefTRIAXone, 2,000 mg, Intravenous, Q24H  furosemide, 40 mg, Intravenous, Q8H  gadobenate dimeglumine, 20 mL, Intravenous, Once in imaging  lisinopril, 40 mg, Oral, Daily  metoprolol tartrate, 50 mg, Oral, BID  sodium chloride, 10 mL, Intravenous, Q12H  sodium hypochlorite, , Topical, Q12H  vancomycin, 1,250 mg, Intravenous, Q12H      Continuous Infusions:Pharmacy to dose vancomycin,       PRN Meds:.  acetaminophen **OR** acetaminophen **OR** acetaminophen    senna-docusate sodium **AND** polyethylene glycol **AND** bisacodyl **AND** bisacodyl    calcium carbonate    nitroglycerin    ondansetron ODT **OR** ondansetron    Pharmacy to dose vancomycin    [COMPLETED] Insert Peripheral IV **AND** sodium chloride    sodium chloride    sodium chloride    traMADol    Assessment & Plan   Assessment & Plan     Active Hospital Problems    Diagnosis  POA    **Anasarca [R60.1]  Yes    Wound infection [T14.8XXA, L08.9]  Yes    Bacterial conjunctivitis [H10.9]  Yes     Prediabetes [R73.03]  Yes    Diastolic CHF, acute [I50.31]  Yes    Abrasion of ankle with infection, right, initial encounter [S90.511A, L08.9]  Yes    Cellulitis of right ankle [L03.115]  Yes    Pulmonary hypertension [I27.20]  Yes    Suspected sleep apnea [R29.818]  Yes    Obesity hypoventilation syndrome [E66.2]  Yes    Morbid (severe) obesity [E66.01]  Yes    Tobacco use disorder [F17.200]  Yes    Essential hypertension [I10]  Yes      Resolved Hospital Problems   No resolved problems to display.        Brief Hospital Course to date:  Richard Rodrigez is a 44 y.o. male presents to the hospital with complicated right purulent recurrent ankle wound with acute diastolic CHF/anasarca.    Ankle wound:  Appreciate vascular surgery.  Further vascular workup.  MRI today.  I will review the images and follow-up on radiologist read.  Culture reviewed growing gram-negative organism.  Broad-spectrum coverage.  Awaiting further speciation and likely will de-escalate vancomycin.  Continue Tylenol and add tramadol for pain.    CHF/anasarca:  Renal function stable.  Repeat IV diuresis today.  Plan status improving.  Repeat echo.  proBNP is significantly elevated.  Troponin elevation with negative delta seems to be due to CHF.  No chest pain, cardiac symptoms or signs of ACS.    Prediabetes: Heart healthy diet.  Recommend lifestyle changes.    Morbid obesity: Recommend lifestyle changes, improve diet and exercise and gradual healthy weight loss.    Obesity hypoventilation syndrome: Supplemental oxygen as needed to maintain greater than 89%.  Avoid hyperoxia.  Mild borderline hypoxia noted at admission.      Essential hypertension: Monitor blood pressure and adjust medications as needed.  Decrease lisinopril with diuresis.    Treatment plan discussed with the patient is in agreement.    DVT Prophylaxis: Mechanical        Disposition: I expect the patient to be discharged home pending improvement and clearance from some  specialist.    CODE STATUS:   Code Status and Medical Interventions:   Ordered at: 02/10/24 8835     Code Status (Patient has no pulse and is not breathing):    CPR (Attempt to Resuscitate)     Medical Interventions (Patient has pulse or is breathing):    Full Support       Manolo Silvestre MD  24      Electronically signed by Manolo Silvestre MD at 24 1353          Consult Notes (all)        Michele Anaya MD at 24 1055        Consult Orders    1. Inpatient Vascular Surgery Consult [[id number]] ordered by Starr Cage APRN at 02/10/24 1246                   Patient Name: Richard Rodrigez Account #: [id number]    MRN: [id number] Admission Date: 2/10/2024      Consulting Service: Vascular Surgery Date of Evaluation: 2024    Requesting Provider: Manolo Silvestre MD        [place]      CHIEF COMPLAINT: Right lateral leg wound  HPI: Richard Rodrigez is a 44 y.o. male is being seen for a consultation and evaluation/management of with what appears to be a large right lateral leg wound that is likely stasis in nature.  Patient has chronic swelling right worse on the left and this is a wound that reopened after some time.  He has some necrotic cortical debris tomorrow at bedside.  The rest looks healthy although I cannot palpate his pulses due to his swelling.  Will check ABIs and a lower extremity venous scan.  At some point he will need a mapping in the office.  Will begin by addressing him with Ace wrap's toes to knee and we will ask lymphedema therapy to see him tomorrow.    PAST MEDICAL HISTORY:   Past Medical History:   Diagnosis Date    Acute respiratory failure with hypoxia and hypercapnia 12/10/2021    Hypertension       PAST SURGICAL HISTORY:   Past Surgical History:   Procedure Laterality Date    FRACTURE SURGERY        FAMILY HISTORY:   Family History   Problem Relation Age of Onset    Stroke Mother     Hypertension Mother     No Known  Problems Sister     No Known Problems Brother     No Known Problems Sister     No Known Problems Sister     No Known Problems Sister     Breast cancer Maternal Grandmother     Colon cancer Neg Hx     Prostate cancer Neg Hx       SOCIAL HISTORY:   Social History     Tobacco Use    Smoking status: Every Day     Types: Cigars    Smokeless tobacco: Never   Vaping Use    Vaping Use: Never used   Substance Use Topics    Alcohol use: Yes     Alcohol/week: 2.0 standard drinks of alcohol     Types: 2 Shots of liquor per week     Comment: occ    Drug use: Never      MEDICATIONS:   No current facility-administered medications on file prior to encounter.     Current Outpatient Medications on File Prior to Encounter   Medication Sig Dispense Refill    furosemide (LASIX) 40 MG tablet Take 1 tablet by mouth 2 (Two) Times a Day. 180 tablet 3    lisinopril (PRINIVIL,ZESTRIL) 40 MG tablet Take 1 tablet by mouth Daily. 90 tablet 3    metoprolol tartrate (LOPRESSOR) 50 MG tablet Take 1 tablet by mouth 2 (Two) Times a Day. 180 tablet 3             ALLERGIES: Patient has no known allergies.   COMPLETE REVIEW OF SYSTEMS:     ENT ROS: negative  Cardiovascular ROS: no chest pain or dyspnea on exertion  Respiratory ROS: no cough, shortness of breath, or wheezing  Gastrointestinal ROS: no abdominal pain, change in bowel habits, or black or bloody stools  Neurological ROS: no TIA or stroke symptoms  Genito-Urinary ROS: no dysuria, trouble voiding, or hematuria  Musculoskeletal ROS: positive for -severe swelling right worse than left and lower extremities with  Dermatological ROS: negative  Psychological ROS: negative      PHYSICAL EXAM:   Patient Vitals for the past 24 hrs:   BP Temp Temp src Pulse Resp SpO2   02/11/24 0740 109/67 97.5 °F (36.4 °C) Oral 88 20 100 %   02/10/24 2300 111/64 98.1 °F (36.7 °C) Oral 75 20 93 %   02/10/24 2021 111/95 97.9 °F (36.6 °C) Oral 74 20 100 %   02/10/24 1355 150/80 97.7 °F (36.5 °C) Oral 91 18 --         General appearance: alert, well appearing, and in no distress.  Neurological exam reveals alert, oriented, normal speech, no focal findings or movement disorder noted.  ENT exam reveals - ENT exam normal, no neck nodes or sinus tenderness.  CVS exam: normal rate, regular rhythm, normal S1, S2, no murmurs, rubs, clicks or gallops.  Chest: clear to auscultation, no wheezes, rales or rhonchi, symmetric air entry.  Abdominal exam: soft, nontender, nondistended, no masses or organomegaly.  Examination of the feet reveals warm, good capillary refill.  Severe swelling in both lower extremities right worse than left with large lateral ulcer with some necrotic core at the base.  Cellulitis in the lower leg.  Pulses difficult to palpate at the foot.  Palpable femorals.        LABS:      Results Review:       I reviewed the patient's new clinical results.  Results from last 7 days   Lab Units 02/11/24  0601 02/10/24  0711 02/10/24  0311   WBC 10*3/mm3 6.09 6.50 6.08   HEMOGLOBIN g/dL 16.7 16.5 16.8   PLATELETS 10*3/mm3 161 202 194     Results from last 7 days   Lab Units 02/11/24  0805 02/10/24  0711 02/10/24  0311   SODIUM mmol/L 141 139 138   POTASSIUM mmol/L 4.5 5.0 5.0   CHLORIDE mmol/L 95* 98 96*   CO2 mmol/L 36.7* 30.5* 32.8*   BUN mg/dL 27* 39* 40*   CREATININE mg/dL 1.00 1.22 1.28*   GLUCOSE mg/dL 86 76 65   Estimated Creatinine Clearance: 141.5 mL/min (by C-G formula based on SCr of 1 mg/dL).  Results from last 7 days   Lab Units 02/11/24  0805 02/10/24  0711 02/10/24  0311   CALCIUM mg/dL 8.4* 9.0 9.3   ALBUMIN g/dL  --   --  3.1*  3.0*   MAGNESIUM mg/dL  --   --  2.2           The following radiologic or non-invasive studies have been reviewed by me: Right ankle films    Active Hospital Problems    Diagnosis  POA    **Anasarca [R60.1]  Yes    Wound infection [T14.8XXA, L08.9]  Yes    Bacterial conjunctivitis [H10.9]  Yes    Prediabetes [R73.03]  Yes    Diastolic CHF, acute [I50.31]  Yes    Abrasion of ankle  with infection, right, initial encounter [S90.511A, L08.9]  Yes    Cellulitis of right ankle [L03.115]  Yes    Pulmonary hypertension [I27.20]  Yes    Suspected sleep apnea [R29.818]  Yes    Obesity hypoventilation syndrome [E66.2]  Yes    Morbid (severe) obesity [E66.01]  Yes    Tobacco use disorder [F17.200]  Yes    Essential hypertension [I10]  Yes      Resolved Hospital Problems   No resolved problems to display.         ASSESSMENT/PLAN: 44 y.o. male with large recurrent stasis ulceration of the right lateral leg.  Will check ABIs as I cannot take palpate pulses and will bedside debridement tomorrow.  Will add ace wraps to both legs and asked lymphedema therapy to evaluate tomorrow.  He will need outpatient workup for reflux but we will get a plain duplex today as well with his STEPH.  If he has severe superficial reflux he may be a candidate for ablation but will see as this lesion is on the lateral aspect of the leg.  Will follow with you.      I discussed the plan with the patient and he is agreeable to the plan of care at this point. Thank you for this consult.     Michele Anaya MD   02/11/24       Electronically signed by Michele Anaya MD at 02/11/24 1101          Rose Montejo, RN, CWON   Registered Nurse  Wound Care     Nursing Note      Signed     Date of Service: 02/12/24 0906  Creation Time: 02/12/24 0906     Signed         CWON note: Pt consult received for right ankle wound, Vascular is managing this wound and has taken pt to the OR today. Please re-consult for any additional needs.

## 2024-02-13 LAB
ALBUMIN SERPL-MCNC: 2.8 G/DL (ref 3.5–5.2)
ALP SERPL-CCNC: 135 U/L (ref 39–117)
ALT SERPL W P-5'-P-CCNC: 37 U/L (ref 1–41)
ANION GAP SERPL CALCULATED.3IONS-SCNC: 10 MMOL/L (ref 5–15)
AST SERPL-CCNC: 68 U/L (ref 1–40)
BILIRUB CONJ SERPL-MCNC: 2.1 MG/DL (ref 0–0.3)
BILIRUB INDIRECT SERPL-MCNC: 0.9 MG/DL
BILIRUB SERPL-MCNC: 3 MG/DL (ref 0–1.2)
BUN SERPL-MCNC: 28 MG/DL (ref 6–20)
BUN/CREAT SERPL: 28.6 (ref 7–25)
CALCIUM SPEC-SCNC: 8.5 MG/DL (ref 8.6–10.5)
CHLORIDE SERPL-SCNC: 94 MMOL/L (ref 98–107)
CO2 SERPL-SCNC: 31 MMOL/L (ref 22–29)
CREAT SERPL-MCNC: 0.98 MG/DL (ref 0.76–1.27)
DEPRECATED RDW RBC AUTO: 58.8 FL (ref 37–54)
EGFRCR SERPLBLD CKD-EPI 2021: 98.1 ML/MIN/1.73
ERYTHROCYTE [DISTWIDTH] IN BLOOD BY AUTOMATED COUNT: 19.8 % (ref 12.3–15.4)
GLUCOSE SERPL-MCNC: 104 MG/DL (ref 65–99)
HCT VFR BLD AUTO: 54.6 % (ref 37.5–51)
HGB BLD-MCNC: 16.2 G/DL (ref 13–17.7)
MAGNESIUM SERPL-MCNC: 2 MG/DL (ref 1.6–2.6)
MCH RBC QN AUTO: 26.3 PG (ref 26.6–33)
MCHC RBC AUTO-ENTMCNC: 29.7 G/DL (ref 31.5–35.7)
MCV RBC AUTO: 88.5 FL (ref 79–97)
PLATELET # BLD AUTO: 163 10*3/MM3 (ref 140–450)
PMV BLD AUTO: 10.9 FL (ref 6–12)
POTASSIUM SERPL-SCNC: 5.6 MMOL/L (ref 3.5–5.2)
PROT SERPL-MCNC: 7.4 G/DL (ref 6–8.5)
RBC # BLD AUTO: 6.17 10*6/MM3 (ref 4.14–5.8)
SODIUM SERPL-SCNC: 135 MMOL/L (ref 136–145)
WBC NRBC COR # BLD AUTO: 7.14 10*3/MM3 (ref 3.4–10.8)

## 2024-02-13 PROCEDURE — 83735 ASSAY OF MAGNESIUM: CPT | Performed by: INTERNAL MEDICINE

## 2024-02-13 PROCEDURE — 97161 PT EVAL LOW COMPLEX 20 MIN: CPT

## 2024-02-13 PROCEDURE — 25010000002 FUROSEMIDE PER 20 MG: Performed by: INTERNAL MEDICINE

## 2024-02-13 PROCEDURE — 97530 THERAPEUTIC ACTIVITIES: CPT

## 2024-02-13 PROCEDURE — 25010000002 CEFTRIAXONE PER 250 MG: Performed by: SURGERY

## 2024-02-13 PROCEDURE — 25010000002 VANCOMYCIN 10 G RECONSTITUTED SOLUTION: Performed by: INTERNAL MEDICINE

## 2024-02-13 PROCEDURE — 85027 COMPLETE CBC AUTOMATED: CPT | Performed by: INTERNAL MEDICINE

## 2024-02-13 PROCEDURE — 99221 1ST HOSP IP/OBS SF/LOW 40: CPT | Performed by: NURSE PRACTITIONER

## 2024-02-13 PROCEDURE — 25810000003 SODIUM CHLORIDE 0.9 % SOLUTION: Performed by: INTERNAL MEDICINE

## 2024-02-13 PROCEDURE — 25010000002 ONDANSETRON PER 1 MG: Performed by: SURGERY

## 2024-02-13 PROCEDURE — 80048 BASIC METABOLIC PNL TOTAL CA: CPT | Performed by: INTERNAL MEDICINE

## 2024-02-13 PROCEDURE — 80076 HEPATIC FUNCTION PANEL: CPT | Performed by: INTERNAL MEDICINE

## 2024-02-13 RX ORDER — LISINOPRIL 5 MG/1
5 TABLET ORAL DAILY
Status: DISCONTINUED | OUTPATIENT
Start: 2024-02-14 | End: 2024-02-14 | Stop reason: HOSPADM

## 2024-02-13 RX ORDER — FUROSEMIDE 10 MG/ML
40 INJECTION INTRAMUSCULAR; INTRAVENOUS
Status: DISCONTINUED | OUTPATIENT
Start: 2024-02-13 | End: 2024-02-14

## 2024-02-13 RX ADMIN — ACETAMINOPHEN 325MG 650 MG: 325 TABLET ORAL at 17:05

## 2024-02-13 RX ADMIN — ACETAMINOPHEN 650 MG: 160 SOLUTION ORAL at 10:07

## 2024-02-13 RX ADMIN — CEFTRIAXONE 2000 MG: 2 INJECTION, POWDER, FOR SOLUTION INTRAMUSCULAR; INTRAVENOUS at 04:19

## 2024-02-13 RX ADMIN — Medication 10 ML: at 20:33

## 2024-02-13 RX ADMIN — LISINOPRIL 10 MG: 10 TABLET ORAL at 08:22

## 2024-02-13 RX ADMIN — ANTACID TABLETS 2 TABLET: 500 TABLET, CHEWABLE ORAL at 13:29

## 2024-02-13 RX ADMIN — ONDANSETRON 4 MG: 2 INJECTION INTRAMUSCULAR; INTRAVENOUS at 20:33

## 2024-02-13 RX ADMIN — FUROSEMIDE 40 MG: 10 INJECTION, SOLUTION INTRAMUSCULAR; INTRAVENOUS at 08:21

## 2024-02-13 RX ADMIN — TRAMADOL HYDROCHLORIDE 50 MG: 50 TABLET ORAL at 20:33

## 2024-02-13 RX ADMIN — FUROSEMIDE 40 MG: 10 INJECTION, SOLUTION INTRAMUSCULAR; INTRAVENOUS at 17:05

## 2024-02-13 RX ADMIN — Medication 10 ML: at 08:22

## 2024-02-13 RX ADMIN — METOPROLOL TARTRATE 50 MG: 50 TABLET, FILM COATED ORAL at 20:33

## 2024-02-13 RX ADMIN — TRAMADOL HYDROCHLORIDE 50 MG: 50 TABLET ORAL at 04:32

## 2024-02-13 RX ADMIN — VANCOMYCIN HYDROCHLORIDE 1250 MG: 10 INJECTION, POWDER, LYOPHILIZED, FOR SOLUTION INTRAVENOUS at 16:26

## 2024-02-13 RX ADMIN — VANCOMYCIN HYDROCHLORIDE 1250 MG: 10 INJECTION, POWDER, LYOPHILIZED, FOR SOLUTION INTRAVENOUS at 04:19

## 2024-02-13 RX ADMIN — MAGNESIUM OXIDE 400 MG (241.3 MG MAGNESIUM) TABLET 400 MG: TABLET at 08:22

## 2024-02-13 RX ADMIN — TRAMADOL HYDROCHLORIDE 50 MG: 50 TABLET ORAL at 12:19

## 2024-02-13 NOTE — PROGRESS NOTES
"[facility] Clinical Pharmacy Services: Vancomycin Monitoring Note    Richard Rodrigez is a 44 y.o. male who is on day 4 of stop date of  of pharmacy to dose vancomycin for Skin and Soft Tissue infection (RLE open wound).  NOTE: Dr. Silvestre ordered the vancomycin x 7 days after surgery yesterday so stop date is now 24.     Previous Vancomycin dosin mg IV q12h    Updated Cultures and Sensitivities:    BC No growth x 2 days   WC   Wound Culture Light growth (2+) Gram Negative Bacilli Abnormal      Results from last 7 days   Lab Units 24  1622   VANCOMYCIN TR mcg/mL 13.30     Vitals/Labs  Ht: 182.9 cm (72\"); Wt: (!) 145 kg (320 lb)   Temp Readings from Last 1 Encounters:   24 97.5 °F (36.4 °C) (Oral)     Estimated Creatinine Clearance: 144.3 mL/min (by C-G formula based on SCr of 0.98 mg/dL).       Results from last 7 days   Lab Units 24  0428 24  0805 24  0601 02/10/24  0711   CREATININE mg/dL 0.92 1.00  --  1.22   WBC 10*3/mm3 5.33  --  6.09 6.50     Assessment/Plan    Current Vancomycin Dose: continuing 1250 mg IV q12h-provides a predicted  mg/L.hr  Next Level Date and Time: no further levels indicated at this time. If patient still here on the 16th might check level for accumulation.   We will continue to monitor patient changes and renal function     Thank you for involving pharmacy in this patient's care. Please contact pharmacy with any questions or concerns.       Torie Barone, [facility]  Clinical Pharmacist                  "

## 2024-02-13 NOTE — CASE MANAGEMENT/SOCIAL WORK
Discharge Planning Assessment  [facility]     Patient Name: Richard Rodrigez  MRN: [id number]  Today's Date: 2/13/2024    Admit Date: 2/10/2024    Plan: Home, family/friend to transport - PT eval pending   Discharge Needs Assessment       Row Name 02/13/24 1011       Living Environment    People in Home alone    Current Living Arrangements apartment    Primary Care Provided by self    Provides Primary Care For no one    Family Caregiver if Needed none    Able to Return to Prior Arrangements yes       Resource/Environmental Concerns    Resource/Environmental Concerns none       Transition Planning    Patient/Family Anticipates Transition to home    Patient/Family Anticipated Services at Transition none    Transportation Anticipated family or friend will provide       Discharge Needs Assessment    Equipment Currently Used at Home none    Concerns to be Addressed discharge planning                   Discharge Plan       Row Name 02/13/24 1011       Plan    Plan Home, family/friend to transport - PT eval pending    Patient/Family in Agreement with Plan yes    Plan Comments CCP spoke with patient at bedside; explained role, verified facesheet, and discussed dc plan. Patient uses no DME and is indepedent for mobility at baseline. He lives alone in a 1 level home with no steps to enter. He reports no trouble with ambulation throughout his home. He has no history of HH or SNF. Patient denies any HH or DME needs and reports plan is home via family transport. PT eval pending - CCP following for any reccomendations. THOMAS, REMINGTONW                  Continued Care and Services - Admitted Since 2/10/2024    Coordination has not been started for this encounter.       Expected Discharge Date and Time       Expected Discharge Date Expected Discharge Time    Feb 14, 2024            Demographic Summary       Row Name 02/13/24 1011       General Information    Admission Type inpatient    Arrived From home    Referral Source admission  list    Reason for Consult discharge planning    Preferred Language English       Contact Information    Permission Granted to Share Info With family/designee                   Functional Status       Row Name 02/13/24 1011       Functional Status    Usual Activity Tolerance good    Current Activity Tolerance good       Functional Status, IADL    Medications independent    Meal Preparation independent    Housekeeping independent    Laundry independent    Shopping independent       Mental Status    General Appearance WDL WDL                   Psychosocial    No documentation.                  Abuse/Neglect    No documentation.                  Legal    No documentation.                  Substance Abuse    No documentation.                  Patient Forms    No documentation.                     ONEAL Ernandez

## 2024-02-13 NOTE — PLAN OF CARE
Problem: Adult Inpatient Plan of Care  Goal: Plan of Care Review  Outcome: Ongoing, Progressing   Goal Outcome Evaluation:

## 2024-02-13 NOTE — CONSULTS
[facility]        Patient Name: Richard Rodrigez  Age/Sex: 44 y.o. male  : 1980  MRN: [id number]    Date of Admission: 2/10/2024  Date of Encounter Visit: 24  Encounter Provider: QUIQUE Pisano  Referring Provider: Jono Houston MD  Place of Service: [facility] CARDIOLOGY  Patient Care Team:  Jessie Almodovar III NP-C as PCP - General (Family Medicine)    Subjective:     Chief Complaint: Ankle wound    Reason for consult: Severely reduced RV systolic function    History of Present Illness:  Richard Rodrigez is a 44 y.o. male who follows Dr. Moreno and myself in our office.  Patient was admitted with wound infection.  We have been asked to be seen for severely reduced RV systolic function.    Patient with history significant for anasarca, pulmonary hypertension, suspected sleep apnea, obesity hypoventilation syndrome, respiratory failure, hypoxia, hepatic steatosis, obesity, tobacco abuse.    In 2021 patient was hospitalized with anasarca and pulmonary hypertension.  Anasarca was not felt to be secondary to CHF.  Patient was noted to have pulmonary hypertension and obesity hypoventilation.  BiPAP was trialed however the patient did not tolerate this.  He was discharged with recommendations for outpatient sleep study.  During hospitalization patient was diuresed and lost over 30 pounds.  He did have abnormalities on imaging suggestive of a liver mass and patient was supposed to follow-up with an outpatient MRI of the liver and GI.    Patient was last seen in cardiology clinic in 2023, at that time he was overdue for follow-up and had not been seen since .  Patient admitted that he continued to smoke.  He presented as he was out of his medication refills.  At that time patient was asymptomatic.    Patient presented to [facility] with complaints of right purulent recurrent ankle wound and  acute diastolic CHF.  Patient is now surgical debridement of right ankle wound by vascular surgery 2/12/2024.  Patient's renal function has been stable.  A repeat echocardiogram has revealed severely reduced RV systolic function with a significantly elevated proBNP.  Patient has required supplemental oxygen secondary to hypoventilation syndrome.  Patient is receiving IV antibiotics for his wound care.  Primary team is following wound cultures.  Patient has received a one-time dose of IV furosemide.  Patient has had a negative bilateral lower extremity venous Doppler.  He has not had a CTA of the chest.    Patient notes that he is not experiencing any shortness of breath.  He does notice edema to his abdomen region, scrotum and bilateral lower extremities.  He notes that he has not been evaluated by sleep medicine nor had a sleep study.  He states that he was unable to tolerate BiPAP mask when he was hospitalized in 2021.    Cardiac Procedures:  Echocardiogram 12/9/2021.  LVEF 57%.  Mild LVH.  Diastolic function indeterminate.  Mildly reduced RV systolic function.  Echocardiogram 2/11/2024.  Right cavity is severely dilated with severely reduced RV systolic function.  LVEF 61 to 65%.  Borderline hypertrophy.  Calculated RVSP 47 mmHg.            Past Medical History:  Past Medical History:   Diagnosis Date    Acute respiratory failure with hypoxia and hypercapnia 12/10/2021    Hypertension        Past Surgical History:   Procedure Laterality Date    FRACTURE SURGERY         Home Medications:   Medications Prior to Admission   Medication Sig Dispense Refill Last Dose    furosemide (LASIX) 40 MG tablet Take 1 tablet by mouth 2 (Two) Times a Day. 180 tablet 3     lisinopril (PRINIVIL,ZESTRIL) 40 MG tablet Take 1 tablet by mouth Daily. 90 tablet 3     metoprolol tartrate (LOPRESSOR) 50 MG tablet Take 1 tablet by mouth 2 (Two) Times a Day. 180 tablet 3        Allergies:  No Known Allergies    Past Social History:  Social  History     Socioeconomic History    Marital status:    Tobacco Use    Smoking status: Every Day     Types: Cigars    Smokeless tobacco: Never   Vaping Use    Vaping Use: Never used   Substance and Sexual Activity    Alcohol use: Yes     Alcohol/week: 2.0 standard drinks of alcohol     Types: 2 Shots of liquor per week     Comment: occ, excessive per ER note    Drug use: Never    Sexual activity: Defer       Past Family History:  Family History   Problem Relation Age of Onset    Stroke Mother     Hypertension Mother     No Known Problems Sister     No Known Problems Brother     No Known Problems Sister     No Known Problems Sister     No Known Problems Sister     Breast cancer Maternal Grandmother     Colon cancer Neg Hx     Prostate cancer Neg Hx        Review of Systems   Constitutional: Negative for chills, fever, weight gain and weight loss.   Cardiovascular:  Positive for leg swelling.   Respiratory:  Negative for cough, snoring and wheezing.    Hematologic/Lymphatic: Negative for bleeding problem. Does not bruise/bleed easily.   Skin:  Positive for poor wound healing. Negative for color change.   Musculoskeletal:  Negative for falls, joint pain and myalgias.   Gastrointestinal:  Negative for melena.   Genitourinary:  Negative for hematuria.   Neurological:  Negative for excessive daytime sleepiness.   Psychiatric/Behavioral:  Negative for depression. The patient is not nervous/anxious.          Objective:   Temp:  [94.7 °F (34.8 °C)-97.9 °F (36.6 °C)] 97.5 °F (36.4 °C)  Heart Rate:  [58-80] 75  Resp:  [16-18] 16  BP: (102-123)/(48-72) 105/50     Intake/Output Summary (Last 24 hours) at 2/13/2024 0831  Last data filed at 2/12/2024 1700  Gross per 24 hour   Intake 540 ml   Output 1100 ml   Net -560 ml     Body mass index is 43.4 kg/m².      02/10/24  0018 02/11/24  1150   Weight: (!) 145 kg (320 lb) (!) 145 kg (320 lb)     Weight change:     Constitutional:       Appearance: Normal appearance.  Well-developed. Obese.   Eyes:      Conjunctiva/sclera: Conjunctivae normal.   Neck:      Vascular: No carotid bruit.   Pulmonary:      Effort: Pulmonary effort is normal.      Breath sounds: Normal breath sounds.   Cardiovascular:      Normal rate. Regular rhythm. Normal S1. Normal S2.       Murmurs: There is no murmur.      No gallop.  No click. No rub.   Edema:     Peripheral edema present.     Comments: Bilateral, legs and compression wraps  Abdominal:      General: There is distension.   Musculoskeletal: Normal range of motion. Skin:     General: Skin is warm and dry.   Neurological:      Mental Status: Alert and oriented to person, place, and time.      GCS: GCS eye subscore is 4. GCS verbal subscore is 5. GCS motor subscore is 6.   Psychiatric:         Speech: Speech normal.         Behavior: Behavior normal.         Thought Content: Thought content normal.         Judgment: Judgment normal.           Lab Review:   Results from last 7 days   Lab Units 02/13/24 0352 02/12/24 0428 02/11/24  0805 02/10/24  0711 02/10/24  0311   SODIUM mmol/L 135* 137 141 139 138   POTASSIUM mmol/L 5.6* 4.8 4.5 5.0 5.0   CHLORIDE mmol/L 94* 94* 95* 98 96*   CO2 mmol/L 31.0* 34.6* 36.7* 30.5* 32.8*   BUN mg/dL 28* 23* 27* 39* 40*   CREATININE mg/dL 0.98 0.92 1.00 1.22 1.28*   GLUCOSE mg/dL 104* 93 86 76 65   CALCIUM mg/dL 8.5* 8.2* 8.4* 9.0 9.3   AST (SGOT) U/L 68*  --   --   --  68*  68*   ALT (SGPT) U/L 37  --   --   --  35  35     Results from last 7 days   Lab Units 02/10/24  0711 02/10/24  0311   HSTROP T ng/L 76* 88*     Results from last 7 days   Lab Units 02/13/24  0352 02/12/24 0428 02/11/24  0601 02/10/24  0711 02/10/24  0311   WBC 10*3/mm3 7.14 5.33 6.09 6.50 6.08   HEMOGLOBIN g/dL 16.2 16.1 16.7 16.5 16.8   HEMATOCRIT % 54.6* 54.4* 55.1* 55.4* 56.6*   PLATELETS 10*3/mm3 163 174 161 202 194         Results from last 7 days   Lab Units 02/13/24  0352 02/10/24  0311   MAGNESIUM mg/dL 2.0 2.2           Invalid  "input(s): \"LDLCALC\"  Results from last 7 days   Lab Units 02/10/24  0311   PROBNP pg/mL 3,398.0*     Results from last 7 days   Lab Units 02/10/24  0311   TSH uIU/mL 4.040       Echo EF Estimated  Results for orders placed during the hospital encounter of 02/10/24    Adult Transthoracic Echo Complete W/ Cont if Necessary Per Protocol    Interpretation Summary    Systolic and diastolic flattening of the interventricular septum consistent with right ventricle pressure and volume overload    The right ventricular cavity is severely dilated. Severely reduced right ventricular systolic function noted.    Left ventricular systolic function is normal. Left ventricular ejection fraction appears to be 61 - 65%.    Left ventricular wall thickness is consistent with borderline concentric hypertrophy.    Left ventricular diastolic function was indeterminate.    Saline test results are negative.    The right atrial cavity is moderately dilated.    Mild tricuspid valve regurgitation is present    Calculated right ventricular systolic pressure from tricuspid regurgitation is 47 mmHg.    There is no evidence of pericardial effusion.      EKG:   I personally viewed and interpreted the patient's EKG      Imaging:  Imaging Results (Most Recent)       Procedure Component Value Units Date/Time    MRI Ankle Right With & Without Contrast [[id number]] Collected: 02/12/24 1851     Updated: 02/12/24 1927    Narrative:      MRI right ankle and hindfoot with and without contrast     HISTORY: Persistent nonhealing wound at the ankle. The wound has  reportedly been present since 2019 when the patient sustained a  fracture.     TECHNIQUE: MRI of the right ankle and hindfoot was performed before and  after the IV administration of 20 mL of MultiHance IV contrast.  Correlation with x-rays 02/10/2023.      FINDINGS: There is osseous ankylosis between the distal tibia and fibula  and evidence of previously removed fracture repair hardware. " Advanced  arthritic change is observed at the tibiotalar joint and there are small  subcortical cysts at the tibial plafond and minimal subcortical marrow  signal change. There is a normal, small volume of fluid at the  tibiotalar joint with expected mild synovial enhancement, typical given  the degree of arthropathy. There is also bulky marginal osteophyte along  the dorsum of the talonavicular and navicular cuneiform joints.     Marrow signal throughout the ankle, hindfoot, and midfoot is otherwise  normal. There is no evidence of osteomyelitis or abscess.     Prominent subcutaneous veins are observed around the foot, ankle, and  the distal lower leg. There is a skin contour abnormality with  exaggerated concavity along the lateral side of the ankle superficial to  the fibula. The axial T1 sequence shows an approximately 12 mm wide area  of low signal tissue extending between the base of the contour  abnormality and the level of the cortex of the distal fibula. The tissue  in this area minimally enhances. This presumably represents some  fibrotic or granulation tissue. There is no abscess. The adjacent  fibular marrow appears normal.     The flexor and extensor tendons are intact. There is no abnormal tendon  sheath effusion.     There is no cyst or mass lesion.       Impression:      Advanced osteoarthritic change at the right tibiotalar joint  and postoperative change from previous ankle fracture repair. There is  solid osseous ankylosis between the distal tibia and fibula. Some  contour deformity of the skin over the lateral side of the ankle is  observed and there appears to be some fibrotic tissue deep to this area.  However, no abscess, osteomyelitis, or other MRI finding of deep  infection is present.     This report was finalized on 2/12/2024 7:24 PM by Dr. Tyler Wang M.D on Workstation: [id number]       XR Ankle 3+ View Right [[id number]] Collected: 02/10/24 0706     Updated: 02/10/24 0706     Narrative:        Patient: RENATO ORR  Time Out: 07:06  Exam(s): XR RIGHT ANKLE     EXAM:    XR Right Ankle Complete, 3 or More Views    CLINICAL HISTORY:     Reason for exam: Open wound.    TECHNIQUE:    Frontal, lateral and oblique views of the right ankle.    COMPARISON:    No relevant prior studies available.    FINDINGS:    Bones joints:  Advanced degenerative changes throughout the entire   ankle without evidence of erosive or destructive change.  No fractures or   dislocations identified diffuse soft tissue swelling about the ankle.    Fusion of the distal tibia and fibula.    Soft tissues:  See above.    IMPRESSION:         No acute findings in the right ankle.    Extensive degenerative changes described above      Impression:          Electronically signed by Jono Cifuentes MD on 02-10-24 at 0706    XR Chest 1 View [[id number]] Collected: 02/10/24 0658     Updated: 02/10/24 0658    Narrative:        Patient: RENATO ORR  Time Out: 06:57  Exam(s): XR CXR 1 VIEW     EXAM:    XR Chest, 1 View    CLINICAL HISTORY:     Reason for exam: volume overload.    TECHNIQUE:    Frontal view of the chest.    COMPARISON:    No relevant prior studies available.    FINDINGS:    Lungs:  Unremarkable.  No consolidation.    Pleural space:  Unremarkable.  No pneumothorax.    Heart:  Cardiomegaly.    Mediastinum:  Unremarkable.  Normal mediastinal contour.    Bones joints:  Unremarkable.  No acute fracture.    IMPRESSION:         No acute findings in the chest.      Impression:          Electronically signed by Jono Cifuentes MD on 02-10-24 at 0657                Assessment:     Active Hospital Problems    Diagnosis  POA    **Anasarca [R60.1]  Yes    Venous stasis ulcer of right ankle [I83.013, L97.319]  Yes    Primary osteoarthritis of right ankle [M19.071]  Yes    Wound infection [T14.8XXA, L08.9]  Yes    Bacterial conjunctivitis [H10.9]  Yes    Prediabetes [R73.03]  Yes    Diastolic CHF, acute [I50.31]  Yes     Abrasion of ankle with infection, right, initial encounter [S90.511A, L08.9]  Yes    Cellulitis of right ankle [L03.115]  Yes    Open wound [T14.8XXA]  Yes    Pulmonary hypertension [I27.20]  Yes    Suspected sleep apnea [R29.818]  Yes    Obesity hypoventilation syndrome [E66.2]  Yes    Morbid (severe) obesity [E66.01]  Yes    Tobacco use disorder [F17.200]  Yes    Essential hypertension [I10]  Yes      Resolved Hospital Problems   No resolved problems to display.          Plan:     Right-sided heart failure:  Patient with moderate to severely reduced RV systolic function on echocardiogram  I suspect that this is from longstanding untreated sleep apnea.  Patient with head concern and high suspicion for sleep apnea during a hospitalization in 2021, he was encouraged to follow-up with an outpatient sleep study and sleep medicine evaluation however he never did.  Patient notes that he cannot tolerate BiPAP device.  Patient and I had long discussion about the correlation between untreated sleep apnea and right-sided heart failure.  Patient is volume overloaded with signs of elevated liver enzymes.  He also has anasarca, scrotum edema and bilateral lower extremity edema  Recommend continuing with IV diuretics  Will get a CTA of the chest to rule out PE as source of right-sided heart failure  Would encourage patient to follow-up with sleep medicine in the outpatient setting to seek treatment of sleep apnea  Anasarca:    Continue with diuretics  Liver congestion:   Liver enzymes are gradually improving with use of diuresis  Right ankle wound  Suspected sleep apnea    Thank you for allowing me to participate in the care of Richard Rodrigez. Feel free to contact me directly with any further questions or concerns.    QUIQUE Pisano  [facility]  02/13/24  08:31 EST      EMR Dragon/Transcription disclaimer:   Parts of this note may be an electronic transcription/translation of spoken language to  printed text using the Dragon dictation system

## 2024-02-13 NOTE — THERAPY EVALUATION
Patient Name: Richard Rodrigez  : 1980    MRN: [id number]                              Today's Date: 2024       Admit Date: 2/10/2024    Visit Dx:     ICD-10-CM ICD-9-CM   1. Anasarca  R60.1 782.3   2. Open wound-right lower extremity  T14.8XXA 879.8   3. Acute kidney injury (DIEGO) with acute tubular necrosis (ATN)  N17.0 584.5   4. Elevated troponin  R79.89 790.6   5. Sepsis, due to unspecified organism, unspecified whether acute organ dysfunction present  A41.9 038.9     995.91   6. Hyperbilirubinemia  E80.6 782.4   7. Cellulitis of right ankle  L03.115 682.6   8. Open wound  T14.8XXA 879.8     Patient Active Problem List   Diagnosis    Anasarca    Morbid (severe) obesity    Tobacco use disorder    Essential hypertension    Hepatic steatosis    Suspected sleep apnea    Obesity hypoventilation syndrome    Pulmonary hypertension    Liver mass    Hepatic hemangioma    Wound infection    Bacterial conjunctivitis    Prediabetes    Diastolic CHF, acute    Abrasion of ankle with infection, right, initial encounter    Cellulitis of right ankle    Open wound    Venous stasis ulcer of right ankle    Primary osteoarthritis of right ankle     Past Medical History:   Diagnosis Date    Acute respiratory failure with hypoxia and hypercapnia 12/10/2021    Hypertension      Past Surgical History:   Procedure Laterality Date    FRACTURE SURGERY      INCISION AND DRAINAGE LEG Right 2024    Procedure: RIGHT LEG ambulatory phlebectomy with LIGATION OF BLEEDING SITE and wound debridement;  Surgeon: Shahzad Ramos Jr., MD;  Location: [facility];  Service: Vascular;  Laterality: Right;      General Information       Row Name 24 1335          Physical Therapy Time and Intention    Document Type evaluation  -CH     Mode of Treatment individual therapy;physical therapy  -CH       Row Name 24 1335          General Information    Prior Level of Function independent:;gait;transfer;bed mobility  no AD  at baseline  -     Existing Precautions/Restrictions fall  -     Barriers to Rehab medically complex  -       Row Name 02/13/24 1335          Living Environment    People in Home alone  -       Row Name 02/13/24 1335          Cognition    Orientation Status (Cognition) oriented x 4  -       Row Name 02/13/24 1335          Safety Issues, Functional Mobility    Impairments Affecting Function (Mobility) pain  -               User Key  (r) = Recorded By, (t) = Taken By, (c) = Cosigned By      Initials Name Provider Type     Shaye Mosqueda, PT Physical Therapist                   Mobility       Row Name 02/13/24 1336          Bed Mobility    Bed Mobility supine-sit;sit-supine  -     Supine-Sit [place] (Bed Mobility) standby assist  -     Sit-Supine [place] (Bed Mobility) not tested  sitting in chair  -     Assistive Device (Bed Mobility) head of bed elevated  -       Row Name 02/13/24 1336          Sit-Stand Transfer    Sit-Stand [place] (Transfers) standby assist  -[facility] Name 02/13/24 1336          Gait/Stairs (Locomotion)    [place] Level (Gait) standby assist  -     Distance in Feet (Gait) 300  -     Deviations/Abnormal Patterns (Gait) martha decreased;festinating/shuffling;gait speed decreased;base of support, wide;stride length decreased;weight shifting decreased;antalgic  -     Comment, (Gait/Stairs) R LE in external rotation, gait started very antalgic and unsteady but improved with distance  -               User Key  (r) = Recorded By, (t) = Taken By, (c) = Cosigned By      Initials Name Provider Type     Shaye Mosqueda, PT Physical Therapist                   Obj/Interventions       Row Name 02/13/24 1339          Range of Motion Comprehensive    General Range of Motion no range of motion deficits identified  -[facility] Name 02/13/24 1339          Strength Comprehensive (MMT)    Comment, General Manual Muscle Testing (MMT) Assessment some R  LE weakness noted with functional mobility secondary to pain  -       Row Name 02/13/24 1339          Motor Skills    Therapeutic Exercise --  5 reps B LE AP, LAQ, and seated marches  -[facility] Name 02/13/24 1339          Balance    Balance Assessment standing static balance;standing dynamic balance  -     Static Standing Balance verbal cues;standby assist  -     Dynamic Standing Balance verbal cues;standby assist  -               User Key  (r) = Recorded By, (t) = Taken By, (c) = Cosigned By      Initials Name Provider Type     Shaye Mosqueda, PT Physical Therapist                   Goals/Plan       Row Name 02/13/24 1343          Bed Mobility Goal 1 (PT)    Activity/Assistive Device (Bed Mobility Goal 1, PT) bed mobility activities, all  -CH     [place] Level/Cues Needed (Bed Mobility Goal 1, PT) independent  -CH     Time Frame (Bed Mobility Goal 1, PT) 1 week  -[facility] Name 02/13/24 1343          Transfer Goal 1 (PT)    Activity/Assistive Device (Transfer Goal 1, PT) transfers, all  -CH     [place] Level/Cues Needed (Transfer Goal 1, PT) independent  -CH     Time Frame (Transfer Goal 1, PT) 1 week  -[facility] Name 02/13/24 1343          Gait Training Goal 1 (PT)    Activity/Assistive Device (Gait Training Goal 1, PT) gait (walking locomotion)  -CH     [place] Level (Gait Training Goal 1, PT) independent  -CH     Distance (Gait Training Goal 1, PT) 300  -CH     Time Frame (Gait Training Goal 1, PT) 1 week  -[facility] Name 02/13/24 134          Therapy Assessment/Plan (PT)    Planned Therapy Interventions (PT) balance training;bed mobility training;gait training;home exercise program;patient/family education;strengthening;transfer training  -               User Key  (r) = Recorded By, (t) = Taken By, (c) = Cosigned By      Initials Name Provider Type     Shaye Mosqueda, PT Physical Therapist                   Clinical Impression       Row Name 02/13/24 1342           Pain    Pretreatment Pain Rating 7/10  -     Posttreatment Pain Rating 7/10  -     Pain Location - Side/Orientation Right  -     Pain Location - ankle  -     Pain Intervention(s) Repositioned  -       Row Name 02/13/24 1340          Plan of Care Review    Plan of Care Reviewed With patient  -     Outcome Evaluation Pt is a 43 yo M who is s/p  R LE ambulatory phlebectomy and wound debridement for bleeding ankle ulcer. Pt presents to PT with some R LE pain and mild weakness resulting in some impaired balance and gait. Pt's gait did improve with distance. Pt may benefit from skilled Pt to address strength, balance, and gait. Pt plans to return home upon discharge.  -       Row Name 02/13/24 1340          Therapy Assessment/Plan (PT)    Patient/Family Therapy Goals Statement (PT) to return to [facility]  -     Criteria for Skilled Interventions Met (PT) skilled treatment is necessary  -     Therapy Frequency (PT) 3 times/wk  -       Row Name 02/13/24 1340          Positioning and Restraints    Pre-Treatment Position in bed  -     Post Treatment Position chair  -     In Chair reclined;call light within reach;encouraged to call for assist;exit alarm on  -               User Key  (r) = Recorded By, (t) = Taken By, (c) = Cosigned By      Initials Name Provider Type     Shaye Mosqueda, PT Physical Therapist                   Outcome Measures       Row Name 02/13/24 1343 02/13/24 0751       How much help from another person do you currently need...    Turning from your back to your side while in flat bed without using bedrails? 3  -CH 4  -BC    Moving from lying on back to sitting on the side of a flat bed without bedrails? 3  -CH 4  -BC    Moving to and from a bed to a chair (including a wheelchair)? 3  -CH 4  -BC    Standing up from a chair using your arms (e.g., wheelchair, bedside chair)? 3  -CH 4  -BC    Climbing 3-5 steps with a railing? 3  -CH 4  -BC    To walk in hospital room? 3  -CH 4   -BC    AM-PAC 6 Clicks Score (PT) 18  - 24  -BC    Highest Level of Mobility Goal 6 --> Walk 10 steps or more  - 8 --> Walked 250 feet or more  -BC      Row Name 02/13/24 1343          Functional Assessment    Outcome Measure Options AM-PAC 6 Clicks Basic Mobility (PT)  -               User Key  (r) = Recorded By, (t) = Taken By, (c) = Cosigned By      Initials Name Provider Type     Shaye Mosqueda PT Physical Therapist    BC Ayde Tyler RN Registered Nurse                                 Physical Therapy Education       Title: PT OT SLP Therapies (Done)       Topic: Physical Therapy (Done)       Point: Mobility training (Done)       Learning Progress Summary             Patient Acceptance, E,TB,D, VU,NR by  at 2/13/2024 1344                         Point: Home exercise program (Done)       Learning Progress Summary             Patient Acceptance, E,TB,D, VU,NR by  at 2/13/2024 1344                         Point: Body mechanics (Done)       Learning Progress Summary             Patient Acceptance, E,TB,D, VU,NR by  at 2/13/2024 1344                         Point: Precautions (Done)       Learning Progress Summary             Patient Acceptance, E,TB,D, VU,NR by  at 2/13/2024 1344                                         User Key       Initials Effective Dates Name Provider Type [facility] 06/16/21 -  Shaye Mosqueda PT Physical Therapist PT                  PT Recommendation and Plan  Planned Therapy Interventions (PT): balance training, bed mobility training, gait training, home exercise program, patient/family education, strengthening, transfer training  Plan of Care Reviewed With: patient  Outcome Evaluation: Pt is a 43 yo M who is s/p  R LE ambulatory phlebectomy and wound debridement for bleeding ankle ulcer. Pt presents to PT with some R LE pain and mild weakness resulting in some impaired balance and gait. Pt's gait did improve with distance. Pt may benefit from skilled Pt to  address strength, balance, and gait. Pt plans to return home upon discharge.     Time Calculation:         PT Charges       Row Name 02/13/24 1344             Time Calculation    Start Time 1142  -      Stop Time 1159  -      Time Calculation (min) 17 min  -      PT Received On 02/13/24  -      PT - Next Appointment 02/14/24  -      PT Goal Re-Cert Due Date 02/20/24  -         Time Calculation- PT    Total Timed Code Minutes- PT 12 minute(s)  -CH         Timed Charges    20445 - PT Therapeutic Activity Minutes 12  -CH         Total Minutes    Timed Charges Total Minutes 12  -CH       Total Minutes 12  -CH                User Key  (r) = Recorded By, (t) = Taken By, (c) = Cosigned By      Initials Name Provider Type     Shaye Mosqueda, PT Physical Therapist                  Therapy Charges for Today       Code Description Service Date Service Provider Modifiers Qty    56156092770  PT THERAPEUTIC ACT EA 15 MIN 2/13/2024 Shaye Mosqueda, PT GP 1    [id number]  PT EVAL LOW COMPLEXITY 2 2/13/2024 Shaye Mosqueda, PT GP 1            PT G-Codes  Outcome Measure Options: AM-PAC 6 Clicks Basic Mobility (PT)  AM-PAC 6 Clicks Score (PT): 18  PT Discharge Summary  Anticipated Discharge Disposition (PT): home    Shaye Mosqueda PT  2/13/2024

## 2024-02-13 NOTE — PROGRESS NOTES
"Nutrition Services    Patient Name:  Richard Rodrigez  YOB: 1980  MRN: [id number]  Admit Date:  2/10/2024    Assessment Date:  02/13/24    Summary: Nutrition follow up.   Visit with pt, he is not interested in hearing anything about lifestyle changes and weight loss. Note he has some N/V today. He did say he tried the [place]. I explained what it was for and he said he would try to drink it to support healing his wounds. Labs, meds, skin reviewed.    REC:  Continue [place] BID mixed w/ lemonade  Encourage medical compliance    NUTRITION SCREENING      Reason for Encounter Admission assessment   Diagnosis/Problem Severe anasarca.       PO Diet Diet: Cardiac Diets; Healthy Heart (2-3 Na+); Texture: Regular Texture (IDDSI 7); Fluid Consistency: Thin (IDDSI 0)   Supplements [place] bid   PO Intake % %       Medications MAR reviewed by RD   Labs  Listed below, reviewed   Physical Findings Edema +3 and +2 on UE, LE   GI Function Last BM 2/10   Skin Status Necrotic wound on right ankle, leg incision       Height  Weight  BMI  Weight Trend     Height: 182.9 cm (72\")  Weight: (!) 145 kg (320 lb) (02/11/24 1150)  Body mass index is 43.4 kg/m².  Loss   Estimated/Assessed Needs        Current Weight  Weight: (!) 145 kg (320 lb) (02/11/24 1150)       Energy Requirements    Weight for Calculation 145 kg   Method for Estimation  14 kcal/kg   EST Needs (kcal/day) 2030       Protein Requirements    Weight for Calculation 78 kg   EST Protein Needs (g/kg) 1.5 gm/kg   EST Daily Needs (g/day) 117       Fluid Requirements     Method for Estimation 1 mL/kcal    EST Needs (mL/day)       Nutrition Problem (PES) Overweight / Obesity related to diet and lifestyle as evidenced by weight status and/or BMI.       Intervention/Plan See above    RD to follow up per protocol.     Results from last 7 days   Lab Units 02/13/24  0352 02/12/24  0428 02/11/24  0805 02/10/24  0711 02/10/24  0311   SODIUM mmol/L 135* 137 141   < > 138 "   POTASSIUM mmol/L 5.6* 4.8 4.5   < > 5.0   CHLORIDE mmol/L 94* 94* 95*   < > 96*   CO2 mmol/L 31.0* 34.6* 36.7*   < > 32.8*   BUN mg/dL 28* 23* 27*   < > 40*   CREATININE mg/dL 0.98 0.92 1.00   < > 1.28*   CALCIUM mg/dL 8.5* 8.2* 8.4*   < > 9.3   BILIRUBIN mg/dL 3.0*  --   --   --  5.5*  5.4*   ALK PHOS U/L 135*  --   --   --  142*  145*   ALT (SGPT) U/L 37  --   --   --  35  35   AST (SGOT) U/L 68*  --   --   --  68*  68*   GLUCOSE mg/dL 104* 93 86   < > 65    < > = values in this interval not displayed.     Results from last 7 days   Lab Units 02/13/24  0352 02/10/24  0711 02/10/24  0311   MAGNESIUM mg/dL 2.0  --  2.2   HEMOGLOBIN g/dL 16.2   < > 16.8   HEMATOCRIT % 54.6*   < > 56.6*    < > = values in this interval not displayed.     Lab Results   Component Value Date    HGBA1C 6.30 (H) 02/10/2024         Electronically signed by:  Kamilah Díaz RD  02/13/24 15:46 EST

## 2024-02-13 NOTE — NURSING NOTE
CWON note: attempted to see pt for a dressing change to RLE wound, he was in the bathroom with vomiting and diarrhea. Will try back later today as time allows. In the meantime, the RN had placed a Vaseline gaze and NS moist dressing on the wound.

## 2024-02-13 NOTE — PROGRESS NOTES
Name: Richard Rodrigez ADMIT: 2/10/2024   : 1980  PCP: Jessie Almodovar III, NP-C    MRN: [id number] LOS: 3 days   AGE/SEX: 44 y.o. male  ROOM: [address]    [place], Subsequent Hospital Care    Chief Complaint   Patient presents with    Wound Check    Groin Swelling     CC: Acute right venous hemorrhage  Subjective     44 y.o. male with right leg capillary hemorrhage now status post phlebectomy and debridement.  Wound site looks overall very clean and I do not think debriding dressings are needed.  We will ask wound care to come and evaluate and try to place in antibiotic microbial long-term healing type dressing to the area.  Patient will need to follow-up in 2 weeks for suture removal from the leg.  Long-term workup for further venous hypertension will be pursued outpatient.    Review of Systems leg feels better    Objective     Scheduled Medications:   cefTRIAXone, 2,000 mg, Intravenous, Q24H  furosemide, 40 mg, Intravenous, BID  [START ON 2024] lisinopril, 5 mg, Oral, Daily  magnesium oxide, 400 mg, Oral, Daily  metoprolol tartrate, 50 mg, Oral, BID  sodium chloride, 10 mL, Intravenous, Q12H  vancomycin, 1,250 mg, Intravenous, Q12H        Active Infusions:  lactated ringers, 9 mL/hr, Last Rate: 9 mL/hr (24 0950)  Pharmacy to dose vancomycin,         As Needed Medications:    acetaminophen **OR** acetaminophen **OR** acetaminophen    senna-docusate sodium **AND** polyethylene glycol **AND** bisacodyl **AND** bisacodyl    calcium carbonate    nitroglycerin    ondansetron ODT **OR** ondansetron    Pharmacy to dose vancomycin    [COMPLETED] Insert Peripheral IV **AND** sodium chloride    sodium chloride    traMADol    Vital Signs  Vital Signs Patient Vitals for the past 24 hrs:   BP Temp Temp src Pulse Resp SpO2   24 0700 105/50 97.5 °F (36.4 °C) Oral 75 16 90 %   248 117/63 97.5 °F (36.4 °C) Oral 80 17 94 %   24 113/57 97.9 °F (36.6  °C) Oral 78 17 96 %   02/12/24 1449 109/72 97.4 °F (36.3 °C) Oral 66 16 97 %   02/12/24 1250 111/65 94.7 °F (34.8 °C) Oral 59 16 95 %   02/12/24 1230 123/65 97.7 °F (36.5 °C) Oral 58 18 97 %   02/12/24 1215 105/48 -- -- 61 18 96 %   02/12/24 1200 115/68 -- -- 58 18 98 %   02/12/24 1145 113/67 -- -- 63 18 98 %   02/12/24 1135 -- -- -- 62 -- 97 %   02/12/24 1130 102/55 -- -- 67 16 (!) 88 %   02/12/24 1125 105/60 -- -- 65 16 94 %   02/12/24 1120 105/57 -- -- 60 16 100 %   02/12/24 1117 104/58 97.6 °F (36.4 °C) Oral 69 16 98 %     Vital Signs (range)  Temp:  [94.7 °F (34.8 °C)-97.9 °F (36.6 °C)] 97.5 °F (36.4 °C)  Heart Rate:  [58-80] 75  Resp:  [16-18] 16  BP: (102-123)/(48-72) 105/50  I/O:  I/O last 3 completed shifts:  In: 540 [P.O.:240; I.V.:300]  Out: 3000 [Urine:3000]  I/O:   Intake/Output Summary (Last 24 hours) at 2/13/2024 1056  Last data filed at 2/12/2024 1700  Gross per 24 hour   Intake 540 ml   Output 1100 ml   Net -560 ml     BMI:  Body mass index is 43.4 kg/m².    Physical Exam:  Physical Exam   Lungs coarse  Extremities severely swollen  Abdomen obese but benign  Right leg wound with clean dressing and no more none pulsatile bleeding from the area of concern.    Wound is clean and can be dressed with new type dressing      Results Review:     CBC    Results from last 7 days   Lab Units 02/13/24  0352 02/12/24  0428 02/11/24  0601 02/10/24  0711 02/10/24  0311   WBC 10*3/mm3 7.14 5.33 6.09 6.50 6.08   HEMOGLOBIN g/dL 16.2 16.1 16.7 16.5 16.8   PLATELETS 10*3/mm3 163 174 161 202 194     BMP   Results from last 7 days   Lab Units 02/13/24  0352 02/12/24  0428 02/11/24  0805 02/10/24  0711 02/10/24  0311   SODIUM mmol/L 135* 137 141 139 138   POTASSIUM mmol/L 5.6* 4.8 4.5 5.0 5.0   CHLORIDE mmol/L 94* 94* 95* 98 96*   CO2 mmol/L 31.0* 34.6* 36.7* 30.5* 32.8*   BUN mg/dL 28* 23* 27* 39* 40*   CREATININE mg/dL 0.98 0.92 1.00 1.22 1.28*   GLUCOSE mg/dL 104* 93 86 76 65   MAGNESIUM mg/dL 2.0  --   --   --  2.2  "    Cr Clearance Estimated Creatinine Clearance: 144.3 mL/min (by C-G formula based on SCr of 0.98 mg/dL).  Coag     HbA1C   Lab Results   Component Value Date    HGBA1C 6.30 (H) 02/10/2024    HGBA1C 6.40 (H) 12/07/2021     Blood Glucose No results found for: \"POCGLU\"  Infection   Results from last 7 days   Lab Units 02/10/24  0859 02/10/24  0711 02/10/24  0314 02/10/24  0311   BLOODCX  No growth at 3 days No growth at 3 days  --   --    WOUNDCX   --   --  Light growth (2+) Proteus hauseri*  Moderate growth (3+) Normal Skin Vanessa  --    PROCALCITONIN ng/mL  --   --   --  0.37*     CMP   Results from last 7 days   Lab Units 02/13/24  0352 02/12/24  0428 02/11/24  0805 02/10/24  0711 02/10/24  0311   SODIUM mmol/L 135* 137 141 139 138   POTASSIUM mmol/L 5.6* 4.8 4.5 5.0 5.0   CHLORIDE mmol/L 94* 94* 95* 98 96*   CO2 mmol/L 31.0* 34.6* 36.7* 30.5* 32.8*   BUN mg/dL 28* 23* 27* 39* 40*   CREATININE mg/dL 0.98 0.92 1.00 1.22 1.28*   GLUCOSE mg/dL 104* 93 86 76 65   ALBUMIN g/dL 2.8*  --   --   --  3.1*  3.0*   BILIRUBIN mg/dL 3.0*  --   --   --  5.5*  5.4*   ALK PHOS U/L 135*  --   --   --  142*  145*   AST (SGOT) U/L 68*  --   --   --  68*  68*   ALT (SGPT) U/L 37  --   --   --  35  35     Radiology(recent) MRI Ankle Right With & Without Contrast    Result Date: 2/12/2024  Advanced osteoarthritic change at the right tibiotalar joint and postoperative change from previous ankle fracture repair. There is solid osseous ankylosis between the distal tibia and fibula. Some contour deformity of the skin over the lateral side of the ankle is observed and there appears to be some fibrotic tissue deep to this area. However, no abscess, osteomyelitis, or other MRI finding of deep infection is present.  This report was finalized on 2/12/2024 7:24 PM by Dr. Tyler Wang M.D on Workstation: [id number]       Assessment & Plan     Assessment & Plan      Anasarca    Morbid (severe) obesity    Tobacco use disorder    Essential " hypertension    Suspected sleep apnea    Obesity hypoventilation syndrome    Pulmonary hypertension    Wound infection    Bacterial conjunctivitis    Prediabetes    Diastolic CHF, acute    Abrasion of ankle with infection, right, initial encounter    Cellulitis of right ankle    Open wound    Venous stasis ulcer of right ankle    Primary osteoarthritis of right ankle      44 y.o. male with likely significant venous hypertension in his leg.  Noninvasive testing here is really on the helpful as there is no reflux study done.  Venous bleeding has been controlled with localized phlebectomies and debridement.  Will ask wound care to initiate a different type of dressing then debriding dressings to prevent further injury.  Patient will need likely class II mapping as an outpatient we will do this after we get his sutures out in 2 weeks.  We will sign off and follow-up in [facility] in [facility]    Michele Anaya MD  02/13/24  10:56 EST    Please call my office with any question: [phone]    Active Hospital Problems    Diagnosis  POA    **Anasarca [R60.1]  Yes    Venous stasis ulcer of right ankle [I83.013, L97.319]  Yes    Primary osteoarthritis of right ankle [M19.071]  Yes    Wound infection [T14.8XXA, L08.9]  Yes    Bacterial conjunctivitis [H10.9]  Yes    Prediabetes [R73.03]  Yes    Diastolic CHF, acute [I50.31]  Yes    Abrasion of ankle with infection, right, initial encounter [S90.511A, L08.9]  Yes    Cellulitis of right ankle [L03.115]  Yes    Open wound [T14.8XXA]  Yes    Pulmonary hypertension [I27.20]  Yes    Suspected sleep apnea [R29.818]  Yes    Obesity hypoventilation syndrome [E66.2]  Yes    Morbid (severe) obesity [E66.01]  Yes    Tobacco use disorder [F17.200]  Yes    Essential hypertension [I10]  Yes      Resolved Hospital Problems   No resolved problems to display.

## 2024-02-13 NOTE — PROGRESS NOTES
[facility] Medicine Services  PROGRESS NOTE    Patient Name: Richard Rodrigez  : 1980  MRN: [id number]    Date of Admission: 2/10/2024  Primary Care Physician: Jessie Almodovar III, MENDOZAC    Subjective   Subjective     CC:  Follow up leg wound    S:  Patient reports he continues to feel little bit better today.  He was glad his MRI did not show deep infection.  We discussed our concern for sleep apnea causing his right heart dysfunction.  Case was discussed with cardiology at the bedside at great length and we reviewed all of the clinical findings so far.  Patient reports he has been following with gastroenterologist and has history of a questionable liver lesion and reports he received outpatient MRI that was negative for concerning lesions.  He states he is following up with his gastroenterologist in the future.    Review of Systems  No current fevers or chills  No current shortness of breath or cough  No current nausea, vomiting, or diarrhea  No current chest pain or palpitations       Objective   Objective     Vital Signs:   Temp:  [94.7 °F (34.8 °C)-97.9 °F (36.6 °C)] 97.5 °F (36.4 °C)  Heart Rate:  [58-80] 75  Resp:  [16-18] 16  BP: (102-123)/(48-72) 105/50        Physical Exam:  Constitutional: Awake, alert  Eyes: PERRLA, sclerae anicteric, no conjunctival injection  HENT: NCAT, mucous membranes moist  Neck: Supple, no thyromegaly, no lymphadenopathy, trachea midline  Respiratory: No cough or wheezes, normal inspiration, nonlabored respirations   Cardiovascular: Pulse rate is tachycardic initially but improved, palpable radial pulses bilaterally  Gastrointestinal: Morbidly obese, soft, nontender, nondistended  Musculoskeletal: Severely obese, BMI is 43, pitting bilateral lower extremity edema  Psychiatric: Appropriate affect, cooperative, conversational  Neurologic: No slurred speech or facial droop, follows commands  Skin: Chronic venous stasis changes noted, right ankle  ulceration with some surrounding erythema,  No rashes or jaundice, warm    Results Reviewed:  Results from last 7 days   Lab Units 02/13/24 0352 02/12/24 0428 02/11/24  0601 02/10/24  0711 02/10/24  0311   WBC 10*3/mm3 7.14 5.33 6.09   < > 6.08   HEMOGLOBIN g/dL 16.2 16.1 16.7   < > 16.8   HEMATOCRIT % 54.6* 54.4* 55.1*   < > 56.6*   PLATELETS 10*3/mm3 163 174 161   < > 194   PROCALCITONIN ng/mL  --   --   --   --  0.37*    < > = values in this interval not displayed.     Results from last 7 days   Lab Units 02/13/24 0352 02/12/24 0428 02/11/24  0805 02/10/24  0711 02/10/24  0311   SODIUM mmol/L 135* 137 141 139 138   POTASSIUM mmol/L 5.6* 4.8 4.5 5.0 5.0   CHLORIDE mmol/L 94* 94* 95* 98 96*   CO2 mmol/L 31.0* 34.6* 36.7* 30.5* 32.8*   BUN mg/dL 28* 23* 27* 39* 40*   CREATININE mg/dL 0.98 0.92 1.00 1.22 1.28*   GLUCOSE mg/dL 104* 93 86 76 65   CALCIUM mg/dL 8.5* 8.2* 8.4* 9.0 9.3   ALK PHOS U/L 135*  --   --   --  142*  145*   ALT (SGPT) U/L 37  --   --   --  35  35   AST (SGOT) U/L 68*  --   --   --  68*  68*   HSTROP T ng/L  --   --   --  76* 88*   PROBNP pg/mL  --   --   --   --  3,398.0*     Estimated Creatinine Clearance: 144.3 mL/min (by C-G formula based on SCr of 0.98 mg/dL).    Microbiology Results Abnormal       Procedure Component Value - Date/Time    Blood Culture - Blood, Arm, Left [[id number]]  (Normal) Collected: 02/10/24 0859    Lab Status: Preliminary result Specimen: Blood from Arm, Left Updated: 02/13/24 0915     Blood Culture No growth at 3 days    Blood Culture - Blood, Arm, Right [[id number]]  (Normal) Collected: 02/10/24 0711    Lab Status: Preliminary result Specimen: Blood from Arm, Right Updated: 02/13/24 0730     Blood Culture No growth at 3 days    Tissue / Bone Culture - Tissue, Leg, Right [[id number]] Collected: 02/12/24 1041    Lab Status: Preliminary result Specimen: Tissue from Leg, Right Updated: 02/13/24 0707     Tissue Culture Moderate growth (3+) Normal Skin Vanessa     Gram  Stain No WBCs seen      Rare (1+) Gram positive cocci in pairs    Narrative:      Organism under investigation.              Imaging Results (Last 24 Hours)       Procedure Component Value Units Date/Time    MRI Ankle Right With & Without Contrast [[id number]] Collected: 02/12/24 1851     Updated: 02/12/24 1927    Narrative:      MRI right ankle and hindfoot with and without contrast     HISTORY: Persistent nonhealing wound at the ankle. The wound has  reportedly been present since 2019 when the patient sustained a  fracture.     TECHNIQUE: MRI of the right ankle and hindfoot was performed before and  after the IV administration of 20 mL of MultiHance IV contrast.  Correlation with x-rays 02/10/2023.      FINDINGS: There is osseous ankylosis between the distal tibia and fibula  and evidence of previously removed fracture repair hardware. Advanced  arthritic change is observed at the tibiotalar joint and there are small  subcortical cysts at the tibial plafond and minimal subcortical marrow  signal change. There is a normal, small volume of fluid at the  tibiotalar joint with expected mild synovial enhancement, typical given  the degree of arthropathy. There is also bulky marginal osteophyte along  the dorsum of the talonavicular and navicular cuneiform joints.     Marrow signal throughout the ankle, hindfoot, and midfoot is otherwise  normal. There is no evidence of osteomyelitis or abscess.     Prominent subcutaneous veins are observed around the foot, ankle, and  the distal lower leg. There is a skin contour abnormality with  exaggerated concavity along the lateral side of the ankle superficial to  the fibula. The axial T1 sequence shows an approximately 12 mm wide area  of low signal tissue extending between the base of the contour  abnormality and the level of the cortex of the distal fibula. The tissue  in this area minimally enhances. This presumably represents some  fibrotic or granulation tissue. There is no  abscess. The adjacent  fibular marrow appears normal.     The flexor and extensor tendons are intact. There is no abnormal tendon  sheath effusion.     There is no cyst or mass lesion.       Impression:      Advanced osteoarthritic change at the right tibiotalar joint  and postoperative change from previous ankle fracture repair. There is  solid osseous ankylosis between the distal tibia and fibula. Some  contour deformity of the skin over the lateral side of the ankle is  observed and there appears to be some fibrotic tissue deep to this area.  However, no abscess, osteomyelitis, or other MRI finding of deep  infection is present.     This report was finalized on 2/12/2024 7:24 PM by Dr. Tyler Wang M.D on Workstation: [id number]               Results for orders placed during the hospital encounter of 02/10/24    Adult Transthoracic Echo Complete W/ Cont if Necessary Per Protocol    Interpretation Summary    Systolic and diastolic flattening of the interventricular septum consistent with right ventricle pressure and volume overload    The right ventricular cavity is severely dilated. Severely reduced right ventricular systolic function noted.    Left ventricular systolic function is normal. Left ventricular ejection fraction appears to be 61 - 65%.    Left ventricular wall thickness is consistent with borderline concentric hypertrophy.    Left ventricular diastolic function was indeterminate.    Saline test results are negative.    The right atrial cavity is moderately dilated.    Mild tricuspid valve regurgitation is present    Calculated right ventricular systolic pressure from tricuspid regurgitation is 47 mmHg.    There is no evidence of pericardial effusion.      I have reviewed the medications:  Scheduled Meds:cefTRIAXone, 2,000 mg, Intravenous, Q24H  furosemide, 40 mg, Intravenous, BID  lisinopril, 10 mg, Oral, Daily  magnesium oxide, 400 mg, Oral, Daily  metoprolol tartrate, 50 mg, Oral, BID  sodium  chloride, 10 mL, Intravenous, Q12H  vancomycin, 1,250 mg, Intravenous, Q12H      Continuous Infusions:lactated ringers, 9 mL/hr, Last Rate: 9 mL/hr (02/12/24 0950)  Pharmacy to dose vancomycin,       PRN Meds:.  acetaminophen **OR** acetaminophen **OR** acetaminophen    senna-docusate sodium **AND** polyethylene glycol **AND** bisacodyl **AND** bisacodyl    calcium carbonate    nitroglycerin    ondansetron ODT **OR** ondansetron    Pharmacy to dose vancomycin    [COMPLETED] Insert Peripheral IV **AND** sodium chloride    sodium chloride    traMADol    Assessment & Plan   Assessment & Plan     Active Hospital Problems    Diagnosis  POA    **Anasarca [R60.1]  Yes    Venous stasis ulcer of right ankle [I83.013, L97.319]  Yes    Primary osteoarthritis of right ankle [M19.071]  Yes    Wound infection [T14.8XXA, L08.9]  Yes    Bacterial conjunctivitis [H10.9]  Yes    Prediabetes [R73.03]  Yes    Diastolic CHF, acute [I50.31]  Yes    Abrasion of ankle with infection, right, initial encounter [S90.511A, L08.9]  Yes    Cellulitis of right ankle [L03.115]  Yes    Open wound [T14.8XXA]  Yes    Pulmonary hypertension [I27.20]  Yes    Suspected sleep apnea [R29.818]  Yes    Obesity hypoventilation syndrome [E66.2]  Yes    Morbid (severe) obesity [E66.01]  Yes    Tobacco use disorder [F17.200]  Yes    Essential hypertension [I10]  Yes      Resolved Hospital Problems   No resolved problems to display.        Brief Hospital Course to date:  Richard Rodrigez is a 44 y.o. male presents to the hospital with complicated right purulent recurrent ankle wound with acute diastolic CHF/anasarca.    Discussion/plan for today:  Potassium is somewhat elevated but lab appears to have hemolyzed the sample.  Plan to give diuresis twice daily today and recheck potassium tomorrow.  Continue careful telemetry monitoring.  No new issues reported.  Case discussed with microbiology lab.  Ankle infection seems to be polymicrobial.  He did grow some  Proteus however surgical culture is growing gram-positive.  Clinically the drainage looked more consistent with a gram-positive infection possible Streptococcus or Staphylococcus.  Continue vancomycin and ceftriaxone for today.  Vancomycin level returned 13.  Continue local wound care and will follow-up on surgery recommendations.  Cardiology consulted due to echocardiogram findings of severe reduced right systolic function which is probably from cor pulmonale from untreated sleep apnea.  Patient previously was intolerant to CPAP but agrees to seek outpatient sleep study in the near future.  Decrease lisinopril further.    Ankle wound:  Appreciate vascular surgery.  Further vascular workup.  MRI when possible.  Status post surgical debridement 2/12/2024    CHF/anasarca:  Renal function stable. Plan status improving.  Repeat echo.  proBNP is significantly elevated.  Troponin elevation with negative delta seems to be due to CHF.  No chest pain, cardiac symptoms or signs of ACS.    Prediabetes: Heart healthy diet.  Recommend lifestyle changes.    Morbid obesity: Recommend lifestyle changes, improve diet and exercise and gradual healthy weight loss.    Obesity hypoventilation syndrome and suspected obstructive sleep apnea: Supplemental oxygen as needed to maintain greater than 89%.  Avoid hyperoxia.  Mild borderline hypoxia noted at admission.    Essential hypertension: Monitor blood pressure and adjust medications as needed.  Decrease lisinopril with diuresis.    Treatment plan discussed with the patient is in agreement.    DVT Prophylaxis: Mechanical        Disposition: I expect the patient to be discharged home pending improvement and clearance from some specialist.    CODE STATUS:   Code Status and Medical Interventions:   Ordered at: 02/10/24 0437     Code Status (Patient has no pulse and is not breathing):    CPR (Attempt to Resuscitate)     Medical Interventions (Patient has pulse or is breathing):    Full  Support       Manolo Silvestre MD  02/13/24

## 2024-02-13 NOTE — PLAN OF CARE
Goal Outcome Evaluation:  Plan of Care Reviewed With: patient           Outcome Evaluation: Pt is a 43 yo M who is s/p  R LE ambulatory phlebectomy and wound debridement for bleeding ankle ulcer. Pt presents to PT with some R LE pain and mild weakness resulting in some impaired balance and gait. Pt's gait did improve with distance. Pt may benefit from skilled Pt to address strength, balance, and gait. Pt plans to return home upon discharge.      Anticipated Discharge Disposition (PT): home

## 2024-02-13 NOTE — NURSING NOTE
02/13/24 1346   Wound Right anterior ankle Venous Ulcer   No placement date or time found.   Side: Right  Orientation: anterior  Location: ankle  Primary Wound Type: Venous Ulcer   Wound Image    Base moist;pink   Periwound intact;dry   Periwound Temperature warm   Wound Length (cm) 7 cm   Wound Width (cm) 5 cm   Wound Depth (cm) 0.3 cm   Wound Surface Area (cm^2) 35 cm^2   Wound Volume (cm^3) 10.5 cm^3   Drainage Characteristics/Odor serosanguineous   Drainage Amount small   Care, Wound cleansed with;sterile normal saline   Dressing Care dressing changed;antimicrobial agent applied;low-adherent;silicone  (Versatel contact layer was placed over wound followed by hydrofera blue classic moistened with NS. This was covered with 4x4's. RLE wrapped toes to knee with kerlix and 3 ACE wraps.)     CWON note: pt seen for evaluation of RLE venous stasis wound; he has had some difficulty with bleeding, but none was noted today with dressing change. Wound was protected with silicone contact layer (versatel) prior to placing Hydrofera Blue classic moistened with NS. Leg was then wrapped with Kerlix and 3 ACE wraps toes to knee. Will plan to change dressing again on Friday and may place in a 2 or 3 layer compression wrap at that time.

## 2024-02-14 ENCOUNTER — READMISSION MANAGEMENT (OUTPATIENT)
Dept: CALL CENTER | Facility: HOSPITAL | Age: 44
End: 2024-02-14
Payer: COMMERCIAL

## 2024-02-14 ENCOUNTER — APPOINTMENT (OUTPATIENT)
Dept: CT IMAGING | Facility: HOSPITAL | Age: 44
End: 2024-02-14
Payer: COMMERCIAL

## 2024-02-14 VITALS
DIASTOLIC BLOOD PRESSURE: 48 MMHG | HEART RATE: 73 BPM | WEIGHT: 315 LBS | SYSTOLIC BLOOD PRESSURE: 94 MMHG | BODY MASS INDEX: 42.66 KG/M2 | TEMPERATURE: 97.8 F | HEIGHT: 72 IN | RESPIRATION RATE: 16 BRPM | OXYGEN SATURATION: 96 %

## 2024-02-14 PROBLEM — N17.9 AKI (ACUTE KIDNEY INJURY): Status: ACTIVE | Noted: 2024-02-14

## 2024-02-14 PROBLEM — N17.9 AKI (ACUTE KIDNEY INJURY): Status: RESOLVED | Noted: 2024-02-14 | Resolved: 2024-02-14

## 2024-02-14 PROBLEM — A49.8 PROTEUS INFECTION: Status: ACTIVE | Noted: 2024-02-14

## 2024-02-14 PROBLEM — E80.6 HYPERBILIRUBINEMIA: Status: ACTIVE | Noted: 2024-02-14

## 2024-02-14 PROBLEM — I50.811 ACUTE SYSTOLIC RIGHT HEART FAILURE: Status: ACTIVE | Noted: 2024-02-14

## 2024-02-14 PROBLEM — I50.33 ACUTE ON CHRONIC HEART FAILURE WITH PRESERVED EJECTION FRACTION (HFPEF): Status: ACTIVE | Noted: 2024-02-14

## 2024-02-14 LAB
ALBUMIN SERPL-MCNC: 2.8 G/DL (ref 3.5–5.2)
ALP SERPL-CCNC: 160 U/L (ref 39–117)
ALT SERPL W P-5'-P-CCNC: 51 U/L (ref 1–41)
ANION GAP SERPL CALCULATED.3IONS-SCNC: 6.4 MMOL/L (ref 5–15)
AST SERPL-CCNC: 102 U/L (ref 1–40)
BACTERIA SPEC AEROBE CULT: ABNORMAL
BACTERIA SPEC AEROBE CULT: ABNORMAL
BILIRUB CONJ SERPL-MCNC: 2.3 MG/DL (ref 0–0.3)
BILIRUB INDIRECT SERPL-MCNC: 0.9 MG/DL
BILIRUB SERPL-MCNC: 3.2 MG/DL (ref 0–1.2)
BUN SERPL-MCNC: 30 MG/DL (ref 6–20)
BUN/CREAT SERPL: 31.6 (ref 7–25)
CALCIUM SPEC-SCNC: 8.6 MG/DL (ref 8.6–10.5)
CHLORIDE SERPL-SCNC: 95 MMOL/L (ref 98–107)
CO2 SERPL-SCNC: 36.6 MMOL/L (ref 22–29)
CREAT SERPL-MCNC: 0.95 MG/DL (ref 0.76–1.27)
DEPRECATED RDW RBC AUTO: 57.8 FL (ref 37–54)
EGFRCR SERPLBLD CKD-EPI 2021: 101.9 ML/MIN/1.73
ERYTHROCYTE [DISTWIDTH] IN BLOOD BY AUTOMATED COUNT: 19.4 % (ref 12.3–15.4)
GLUCOSE SERPL-MCNC: 86 MG/DL (ref 65–99)
GRAM STN SPEC: ABNORMAL
GRAM STN SPEC: ABNORMAL
HCT VFR BLD AUTO: 53.1 % (ref 37.5–51)
HGB BLD-MCNC: 15.8 G/DL (ref 13–17.7)
INR PPP: 1.11 (ref 0.9–1.1)
MAGNESIUM SERPL-MCNC: 2.5 MG/DL (ref 1.6–2.6)
MCH RBC QN AUTO: 26.3 PG (ref 26.6–33)
MCHC RBC AUTO-ENTMCNC: 29.8 G/DL (ref 31.5–35.7)
MCV RBC AUTO: 88.4 FL (ref 79–97)
PLATELET # BLD AUTO: 154 10*3/MM3 (ref 140–450)
PMV BLD AUTO: 10.8 FL (ref 6–12)
POTASSIUM SERPL-SCNC: 4.9 MMOL/L (ref 3.5–5.2)
PROT SERPL-MCNC: 7.6 G/DL (ref 6–8.5)
PROTHROMBIN TIME: 14.4 SECONDS (ref 11.7–14.2)
RBC # BLD AUTO: 6.01 10*6/MM3 (ref 4.14–5.8)
SODIUM SERPL-SCNC: 138 MMOL/L (ref 136–145)
WBC NRBC COR # BLD AUTO: 5.88 10*3/MM3 (ref 3.4–10.8)

## 2024-02-14 PROCEDURE — 80076 HEPATIC FUNCTION PANEL: CPT | Performed by: INTERNAL MEDICINE

## 2024-02-14 PROCEDURE — 80048 BASIC METABOLIC PNL TOTAL CA: CPT | Performed by: INTERNAL MEDICINE

## 2024-02-14 PROCEDURE — 99232 SBSQ HOSP IP/OBS MODERATE 35: CPT | Performed by: INTERNAL MEDICINE

## 2024-02-14 PROCEDURE — 85610 PROTHROMBIN TIME: CPT | Performed by: INTERNAL MEDICINE

## 2024-02-14 PROCEDURE — 25010000002 FUROSEMIDE PER 20 MG: Performed by: INTERNAL MEDICINE

## 2024-02-14 PROCEDURE — 85027 COMPLETE CBC AUTOMATED: CPT | Performed by: INTERNAL MEDICINE

## 2024-02-14 PROCEDURE — 25010000002 VANCOMYCIN 10 G RECONSTITUTED SOLUTION: Performed by: INTERNAL MEDICINE

## 2024-02-14 PROCEDURE — 25810000003 SODIUM CHLORIDE 0.9 % SOLUTION: Performed by: INTERNAL MEDICINE

## 2024-02-14 PROCEDURE — 25010000002 CEFTRIAXONE PER 250 MG: Performed by: SURGERY

## 2024-02-14 PROCEDURE — 25510000001 IOPAMIDOL PER 1 ML: Performed by: INTERNAL MEDICINE

## 2024-02-14 PROCEDURE — 83735 ASSAY OF MAGNESIUM: CPT | Performed by: INTERNAL MEDICINE

## 2024-02-14 PROCEDURE — 71275 CT ANGIOGRAPHY CHEST: CPT

## 2024-02-14 RX ORDER — SULFAMETHOXAZOLE AND TRIMETHOPRIM 800; 160 MG/1; MG/1
1 TABLET ORAL EVERY 12 HOURS SCHEDULED
Qty: 10 TABLET | Refills: 0 | Status: SHIPPED | OUTPATIENT
Start: 2024-02-14 | End: 2024-02-19

## 2024-02-14 RX ORDER — LISINOPRIL 5 MG/1
5 TABLET ORAL DAILY
Qty: 30 TABLET | Refills: 0 | Status: SHIPPED | OUTPATIENT
Start: 2024-02-15

## 2024-02-14 RX ORDER — FUROSEMIDE 80 MG
80 TABLET ORAL DAILY
Qty: 30 TABLET | Refills: 0 | Status: SHIPPED | OUTPATIENT
Start: 2024-02-15

## 2024-02-14 RX ORDER — FUROSEMIDE 80 MG
80 TABLET ORAL DAILY
Status: DISCONTINUED | OUTPATIENT
Start: 2024-02-15 | End: 2024-02-14 | Stop reason: HOSPADM

## 2024-02-14 RX ORDER — METOPROLOL TARTRATE 50 MG/1
25 TABLET, FILM COATED ORAL 2 TIMES DAILY
Start: 2024-02-14

## 2024-02-14 RX ORDER — ACETAMINOPHEN 325 MG/1
650 TABLET ORAL EVERY 6 HOURS PRN
Start: 2024-02-14

## 2024-02-14 RX ORDER — SULFAMETHOXAZOLE AND TRIMETHOPRIM 800; 160 MG/1; MG/1
1 TABLET ORAL EVERY 12 HOURS SCHEDULED
Status: DISCONTINUED | OUTPATIENT
Start: 2024-02-14 | End: 2024-02-14 | Stop reason: HOSPADM

## 2024-02-14 RX ADMIN — SULFAMETHOXAZOLE AND TRIMETHOPRIM 1 TABLET: 800; 160 TABLET ORAL at 11:09

## 2024-02-14 RX ADMIN — IOPAMIDOL 95 ML: 755 INJECTION, SOLUTION INTRAVENOUS at 07:31

## 2024-02-14 RX ADMIN — CEFTRIAXONE 2000 MG: 2 INJECTION, POWDER, FOR SOLUTION INTRAMUSCULAR; INTRAVENOUS at 04:26

## 2024-02-14 RX ADMIN — FUROSEMIDE 40 MG: 10 INJECTION, SOLUTION INTRAMUSCULAR; INTRAVENOUS at 08:24

## 2024-02-14 RX ADMIN — METOPROLOL TARTRATE 25 MG: 25 TABLET, FILM COATED ORAL at 08:24

## 2024-02-14 RX ADMIN — MAGNESIUM OXIDE 400 MG (241.3 MG MAGNESIUM) TABLET 400 MG: TABLET at 08:24

## 2024-02-14 RX ADMIN — Medication 10 ML: at 08:24

## 2024-02-14 RX ADMIN — VANCOMYCIN HYDROCHLORIDE 1250 MG: 10 INJECTION, POWDER, LYOPHILIZED, FOR SOLUTION INTRAVENOUS at 03:19

## 2024-02-14 RX ADMIN — LISINOPRIL 5 MG: 5 TABLET ORAL at 08:24

## 2024-02-14 RX ADMIN — ACETAMINOPHEN 650 MG: 160 SOLUTION ORAL at 03:26

## 2024-02-14 NOTE — PROGRESS NOTES
Hospital Follow Up    LOS: 4  Patient Name: Richard Rodrigez  Age/Sex: 44 y.o. male  : 1980  MRN: [id number]    Day of Service: 24   Length of Stay: 4  Encounter Provider: Dennys Moreno MD  Place of Service: [facility]  Patient Care Team:  Jessie Almodovar III, NP-C as PCP - General (Family Medicine)    Subjective:     Chief Complaint: Lower extremity edema    Interval History: Patient looks good says he feels good.  Both legs had Ace wraps had some but fairly minimal swelling.    Objective:     Objective:  Temp:  [97.5 °F (36.4 °C)-98.4 °F (36.9 °C)] 97.5 °F (36.4 °C)  Heart Rate:  [66-99] 69  Resp:  [16] 16  BP: ()/(46-65) 124/65     Intake/Output Summary (Last 24 hours) at 2024 0919  Last data filed at 2024 0115  Gross per 24 hour   Intake 700 ml   Output 500 ml   Net 200 ml     Body mass index is 43.4 kg/m².      02/10/24  0018 24  1150   Weight: (!) 145 kg (320 lb) (!) 145 kg (320 lb)     Weight change:       Physical Exam:   General :  Alert, cooperative, in no acute distress.  Neuro:   Alert,cooperative and oriented.  Lungs:  CTAB. Normal respiratory effort and rate.  CV:  Regular rate and rhythm, normal S1 and S2, no murmurs, gallops or rubs.   ABD:  Soft, nontender, nondistended. Positive bowel sounds.  Extr:  No edema or cyanosis, moves all extremities.    Lab Review:   Results from last 7 days   Lab Units 24  0422 24  0352 02/10/24  0711 02/10/24  0311   SODIUM mmol/L 138 135*   < > 138   POTASSIUM mmol/L 4.9 5.6*   < > 5.0   CHLORIDE mmol/L 95* 94*   < > 96*   CO2 mmol/L 36.6* 31.0*   < > 32.8*   BUN mg/dL 30* 28*   < > 40*   CREATININE mg/dL 0.95 0.98   < > 1.28*   GLUCOSE mg/dL 86 104*   < > 65   CALCIUM mg/dL 8.6 8.5*   < > 9.3   AST (SGOT) U/L  --  68*  --  68*  68*   ALT (SGPT) U/L  --  37  --  35  35    < > = values in this interval not displayed.     Results from last 7 days   Lab  "Units 02/10/24  0711 02/10/24  0311   HSTROP T ng/L 76* 88*     Results from last 7 days   Lab Units 02/14/24  0422 02/13/24  0352   WBC 10*3/mm3 5.88 7.14   HEMOGLOBIN g/dL 15.8 16.2   HEMATOCRIT % 53.1* 54.6*   PLATELETS 10*3/mm3 154 163     Results from last 7 days   Lab Units 02/14/24  0422   INR  1.11*     Results from last 7 days   Lab Units 02/14/24  0422 02/13/24  0352   MAGNESIUM mg/dL 2.5 2.0           Invalid input(s): \"LDLCALC\"  Results from last 7 days   Lab Units 02/10/24  0311   PROBNP pg/mL 3,398.0*     Results from last 7 days   Lab Units 02/10/24  0311   TSH uIU/mL 4.040       Current Medications:   Scheduled Meds:cefTRIAXone, 2,000 mg, Intravenous, Q24H  furosemide, 40 mg, Intravenous, BID  lisinopril, 5 mg, Oral, Daily  magnesium oxide, 400 mg, Oral, Daily  metoprolol tartrate, 25 mg, Oral, BID  sodium chloride, 10 mL, Intravenous, Q12H  sulfamethoxazole-trimethoprim, 1 tablet, Oral, Q12H      Continuous Infusions:lactated ringers, 9 mL/hr, Last Rate: 9 mL/hr (02/12/24 0950)        Allergies:  No Known Allergies    Assessment:       Anasarca    Morbid (severe) obesity    Tobacco use disorder    Essential hypertension    Suspected sleep apnea    Obesity hypoventilation syndrome    Pulmonary hypertension    Wound infection    Bacterial conjunctivitis    Prediabetes    Diastolic CHF, acute    Abrasion of ankle with infection, right, initial encounter    Cellulitis of right ankle    Open wound    Venous stasis ulcer of right ankle    Primary osteoarthritis of right ankle    Proteus infection    Hyperbilirubinemia    Acute on chronic heart failure with preserved ejection fraction (HFpEF)        Plan:   1.  Right-sided heart failure.  This is always difficult because it is hard to treat.  More than likely his heart failure is secondary to untreated sleep apnea.  I encouraged him he is got a get his CPAP he says he is really going to try.  2.  Anasarca/lower extremity edema.  His legs actually look " pretty good today.  3.  Right ankle wound  4.  Patient looks good I think he is okay to go home today.  So far as his diuretics he was on 40 mg twice a day as an outpatient.  At this point I would shift him to 80 mg once a day.  Have him come back and see Cyndie Diaz in 2 weeks.  I would get a BMP in about 1 to 2 weeks.        Dennys Moreno MD  02/14/24  09:19 EST

## 2024-02-14 NOTE — DISCHARGE PLACEMENT REQUEST
"Richard Rodrigez (44 y.o. Male)       Date of Birth   1980    Social Security Number       Address   [address] [place] [address] [place] [address]    Home Phone   [phone]    MRN   [id number]       Tenriism   None    Marital Status                               Admission Date   2/10/24    Admission Type   Emergency    Admitting Provider   Manolo Silvestre MD    Attending Provider   Manolo Silvestre MD    Department, Room/Bed   [address], E567/1       Discharge Date       Discharge Disposition   Home or Self Care    Discharge Destination                                 Attending Provider: Manolo Silvestre MD    Allergies: No Known Allergies    Isolation: None   Infection: None   Code Status: CPR    Ht: 182.9 cm (72\")   Wt: 145 kg (320 lb)    Admission Cmt: None   Principal Problem: Anasarca [R60.1]                   Active Insurance as of 2/10/2024       Primary Coverage       Payor Plan Insurance Group Employer/Plan Group    [employer] C[employer]NA [id number]       Payor Plan Address Payor Plan Phone Number Payor Plan Fax Number Effective Dates    [address] [phone]  1/1/2024 - None Entered    [facility] [phone]         Subscriber Name Subscriber Birth Date Member ID       RICHARD RODRIGEZ 1980 [id number]                     Emergency Contacts        (Rel.) Home Phone Work Phone Mobile Phone    AIDE KING (Daughter) [phone] -- [phone]              Emergency Contact Information       Name Relation Home Work Mobile    AIDE KING Daughter [phone]          "

## 2024-02-14 NOTE — DISCHARGE SUMMARY
[facility] Medicine Services  DISCHARGE SUMMARY    Patient Name: Richard Rodrigez  : 1980  MRN: [id number]    Date of Admission: 2/10/2024  2:00 AM  Date of Discharge: 2024  Primary Care Physician: Jessie Almodovar III, MENDOZAC    Consults       Date and Time Order Name Status Description    2024  6:00 AM Inpatient Cardiology Consult Completed     2/10/2024 12:46 PM Inpatient Vascular Surgery Consult Completed     2/10/2024  4:18 AM A (on-call MD unless specified) Details              Hospital Course       Active Hospital Problems    Diagnosis  POA    **Anasarca [R60.1]  Yes    Proteus infection [A49.8]  Yes    Hyperbilirubinemia [E80.6]  Yes    Acute on chronic heart failure with preserved ejection fraction (HFpEF) [I50.33]  Yes    Acute systolic right heart failure [I50.811]  Yes    Venous stasis ulcer of right ankle [I83.013, L97.319]  Yes    Primary osteoarthritis of right ankle [M19.071]  Yes    Wound infection [T14.8XXA, L08.9]  Yes    Bacterial conjunctivitis [H10.9]  Yes    Prediabetes [R73.03]  Yes    Diastolic CHF, acute [I50.31]  Yes    Abrasion of ankle with infection, right, initial encounter [S90.511A, L08.9]  Yes    Cellulitis of right ankle [L03.115]  Yes    Open wound [T14.8XXA]  Yes    Pulmonary hypertension [I27.20]  Yes    Suspected sleep apnea [R29.818]  Yes    Obesity hypoventilation syndrome [E66.2]  Yes    Morbid (severe) obesity [E66.01]  Yes    Tobacco use disorder [F17.200]  Yes    Essential hypertension [I10]  Yes      Resolved Hospital Problems    Diagnosis Date Resolved POA    DIEGO (acute kidney injury) [N17.9] 2024 Yes          Hospital Course:  Richard Rodrigez is a 44 y.o. male presents to the hospital with complicated right purulent recurrent ankle wound with acute diastolic CHF/anasarca.     Discussion/plan for today:  Potassium is somewhat elevated but lab appears to have hemolyzed the sample.  Plan to give diuresis twice daily today  and recheck potassium tomorrow.  Continue careful telemetry monitoring.  No new issues reported.  Case discussed with microbiology lab.  Ankle infection seems to be polymicrobial.  He did grow some Proteus however surgical culture is growing gram-positive.  Clinically the drainage looked more consistent with a gram-positive infection possible Streptococcus or Staphylococcus.  Continue vancomycin and ceftriaxone for today.  Vancomycin level returned 13.  Continue local wound care and will follow-up on surgery recommendations.  Cardiology consulted due to echocardiogram findings of severe reduced right systolic function which is probably from cor pulmonale from untreated sleep apnea.  Patient previously was intolerant to CPAP but agrees to seek outpatient sleep study in the near future.  Decrease lisinopril further.     Ankle wound/complicated ankle ulcer:  Appreciate vascular surgery.  Further vascular workup.  MRI when possible.  Status post surgical debridement 2/12/2024.  Wound culture grew Proteus which seems to be the predominant infecting organism.  Patient was treated with ceftriaxone and appears stable to discharge on oral Bactrim per susceptibilities.  Patient has had trouble with noncompliance in the past and I have strongly recommended he make sure he complete all the antibiotic pills.  Case management is arranging for further dressing changes and [facility].  Patient will also follow-up in vascular surgery clinic for suture removal.     CHF/anasarca:  Patient has diastolic heart failure and right systolic heart failure with cor pulmonale.  Cardiology evaluated and feel that the right heart failure is likely due to untreated obstructive sleep apnea.  They are going to work on getting him to a sleep study clinic.  Patient agrees and understands it is important he get a sleep study.  In the past he was not able to tolerate CPAP but agrees to try again and discuss other potential  treatment options.  His profuse pitting edema greatly improved with IV diuresis for several days.  Cardiology is recommending Lasix 80 mg daily at discharge.  They are setting up appointment with BMP in 1 week.  Patient did have DIEGO at time of admission with initial creatinine of 1.28.  Etiology was likely cardiorenal.  His baseline creatinine is approximately 0.9.  Also of note vascular surgery followed for his anasarca INR following up with further management for venous insufficiency on an outpatient basis.       Prediabetes: Heart healthy diet.  Recommend lifestyle changes.     Morbid obesity: Recommend lifestyle changes, improve diet and exercise and gradual healthy weight loss.     Obesity hypoventilation syndrome and suspected obstructive sleep apnea: Supplemental oxygen as needed to maintain greater than 89%.  Avoid hyperoxia.  Mild borderline hypoxia noted at admission.  As per above patient needs outpatient sleep study which cardiology reports they are working on.  Alternatively he could receive a referral from his primary care provider.     Essential hypertension: Monitor blood pressure and adjust medications as needed.  Decrease lisinopril with diuresis.  Currently normotensive.    At the time of discharge patient was told to take all medications as prescribed, keep all follow-up appointments, and call their doctor or return to the hospital with any worsening or concerning symptoms.    Please note that this note was made using Dragon voice recognition software               Day of Discharge     Subjective: Patient states he is doing well.  He wants to discharge home.  He agrees to follow-up in wound care clinic for his next dressing changes as instructed.  He agrees to follow-up with primary care provider, vascular surgery, cardiology, and gastroenterology.  He understands he is going to have to work with his outpatient team to continue to manage his chronic medical issues.    Vital Signs:   Temp:  [97.5  °F (36.4 °C)-98.4 °F (36.9 °C)] 97.5 °F (36.4 °C)  Heart Rate:  [66-99] 69  Resp:  [16] 16  BP: ()/(46-65) 124/65     Physical Exam:    Constitutional: Awake, alert  Eyes: PERRLA, sclerae anicteric, no conjunctival injection  HENT: NCAT, mucous membranes moist  Neck: Supple, no thyromegaly, no lymphadenopathy, trachea midline  Respiratory: No cough or wheezes, normal inspiration, nonlabored respirations   Cardiovascular: Pulse rate is now normal, palpable radial pulses bilaterally  Gastrointestinal: Morbidly obese, soft, nontender, nondistended  Musculoskeletal: Severely obese, BMI is 43, much improved bilateral lower extremity edema  Psychiatric: Appropriate affect, cooperative, conversational  Neurologic: No slurred speech or facial droop, follows commands  Skin: Chronic venous stasis changes noted, right ankle ulcer currently with dressing placed yesterday per wound care with no drainage,  No rashes or jaundice, warm       Pertinent  and/or Most Recent Results     Results from last 7 days   Lab Units 02/14/24  0422 02/13/24  0352 02/12/24  0428 02/11/24  0805 02/11/24  0601 02/10/24  0711 02/10/24  0311   WBC 10*3/mm3 5.88 7.14 5.33  --  6.09 6.50 6.08   HEMOGLOBIN g/dL 15.8 16.2 16.1  --  16.7 16.5 16.8   HEMATOCRIT % 53.1* 54.6* 54.4*  --  55.1* 55.4* 56.6*   PLATELETS 10*3/mm3 154 163 174  --  161 202 194   SODIUM mmol/L 138 135* 137 141  --  139 138   POTASSIUM mmol/L 4.9 5.6* 4.8 4.5  --  5.0 5.0   CHLORIDE mmol/L 95* 94* 94* 95*  --  98 96*   CO2 mmol/L 36.6* 31.0* 34.6* 36.7*  --  30.5* 32.8*   BUN mg/dL 30* 28* 23* 27*  --  39* 40*   CREATININE mg/dL 0.95 0.98 0.92 1.00  --  1.22 1.28*   GLUCOSE mg/dL 86 104* 93 86  --  76 65   CALCIUM mg/dL 8.6 8.5* 8.2* 8.4*  --  9.0 9.3     Results from last 7 days   Lab Units 02/14/24  0422 02/13/24  0352 02/10/24  0311   BILIRUBIN mg/dL  --  3.0* 5.5*  5.4*   ALK PHOS U/L  --  135* 142*  145*   ALT (SGPT) U/L  --  37 35  35   AST (SGOT) U/L  --  68* 68*   "68*   PROTIME Seconds 14.4*  --   --    INR  1.11*  --   --            Invalid input(s): \"TG\", \"LDLCALC\", \"LDLREALC\"  Results from last 7 days   Lab Units 02/10/24  0711 02/10/24  0311   TSH uIU/mL  --  4.040   HEMOGLOBIN A1C % 6.30*  --    PROBNP pg/mL  --  3,398.0*   HSTROP T ng/L 76* 88*   PROCALCITONIN ng/mL  --  0.37*   LACTATE mmol/L 1.7 2.2*       Microbiology Results Abnormal       Procedure Component Value - Date/Time    Blood Culture - Blood, Arm, Left [[id number]]  (Normal) Collected: 02/10/24 0859    Lab Status: Preliminary result Specimen: Blood from Arm, Left Updated: 02/14/24 0915     Blood Culture No growth at 4 days    Blood Culture - Blood, Arm, Right [[id number]]  (Normal) Collected: 02/10/24 0711    Lab Status: Preliminary result Specimen: Blood from Arm, Right Updated: 02/14/24 0730     Blood Culture No growth at 4 days            Imaging Results (All)       Procedure Component Value Units Date/Time    CT Angiogram Chest [[id number]] Collected: 02/14/24 0911     Updated: 02/14/24 1003    Narrative:      CT ANGIOGRAM CHEST     HISTORY: Shortness of breath, evaluate for pulmonary embolism.     TECHNIQUE: CT angiogram of the chest was performed with 95 mL [id number]-370  IV contrast. Multiplanar and 3-dimensional reformatted images were  produced at a separate workstation. Correlation with CT angiogram chest  12/12/2021.     FINDINGS: There is prominent diffuse third spacing. The cardiac chambers  are dilated, right more than left. The central pulmonary arteries are  dilated. The main pulmonary artery measures 44 mm diameter on image 54  compared with ascending aorta at the same level 32 mm. No pulmonary  embolism is present, however. The thoracic aorta is normal in caliber  and enhances normally.     No pleural or pericardial effusion. Sub-6 mm left lower lobe nodule is  unchanged compared with the CT from 12/2021, consistent with  postinflammatory benign nodule. No infiltrate or airway lesion.     The " bones appear normal. No lesion identified in the visualized upper  abdominal organs.       Impression:      Diffuse third spacing with more prominent dilatation of the  central pulmonary arteries than was seen on the previous exam. New  dilatation of the right atrium and right ventricle. No pulmonary  embolism.        Radiation dose reduction techniques were utilized, including automated  exposure control and exposure modulation based on body size.        This report was finalized on 2/14/2024 10:00 AM by Dr. Tyler Wang M.D on Workstation: [id number]       MRI Ankle Right With & Without Contrast [[id number]] Collected: 02/12/24 1851     Updated: 02/12/24 1927    Narrative:      MRI right ankle and hindfoot with and without contrast     HISTORY: Persistent nonhealing wound at the ankle. The wound has  reportedly been present since 2019 when the patient sustained a  fracture.     TECHNIQUE: MRI of the right ankle and hindfoot was performed before and  after the IV administration of 20 mL of MultiHance IV contrast.  Correlation with x-rays 02/10/2023.      FINDINGS: There is osseous ankylosis between the distal tibia and fibula  and evidence of previously removed fracture repair hardware. Advanced  arthritic change is observed at the tibiotalar joint and there are small  subcortical cysts at the tibial plafond and minimal subcortical marrow  signal change. There is a normal, small volume of fluid at the  tibiotalar joint with expected mild synovial enhancement, typical given  the degree of arthropathy. There is also bulky marginal osteophyte along  the dorsum of the talonavicular and navicular cuneiform joints.     Marrow signal throughout the ankle, hindfoot, and midfoot is otherwise  normal. There is no evidence of osteomyelitis or abscess.     Prominent subcutaneous veins are observed around the foot, ankle, and  the distal lower leg. There is a skin contour abnormality with  exaggerated concavity along the  lateral side of the ankle superficial to  the fibula. The axial T1 sequence shows an approximately 12 mm wide area  of low signal tissue extending between the base of the contour  abnormality and the level of the cortex of the distal fibula. The tissue  in this area minimally enhances. This presumably represents some  fibrotic or granulation tissue. There is no abscess. The adjacent  fibular marrow appears normal.     The flexor and extensor tendons are intact. There is no abnormal tendon  sheath effusion.     There is no cyst or mass lesion.       Impression:      Advanced osteoarthritic change at the right tibiotalar joint  and postoperative change from previous ankle fracture repair. There is  solid osseous ankylosis between the distal tibia and fibula. Some  contour deformity of the skin over the lateral side of the ankle is  observed and there appears to be some fibrotic tissue deep to this area.  However, no abscess, osteomyelitis, or other MRI finding of deep  infection is present.     This report was finalized on 2/12/2024 7:24 PM by Dr. Tyler Wang M.D on Workstation: [id number]       XR Ankle 3+ View Right [[id number]] Collected: 02/10/24 0706     Updated: 02/10/24 0706    Narrative:        Patient: RENATO ORR  Time Out: 07:06  Exam(s): XR RIGHT ANKLE     EXAM:    XR Right Ankle Complete, 3 or More Views    CLINICAL HISTORY:     Reason for exam: Open wound.    TECHNIQUE:    Frontal, lateral and oblique views of the right ankle.    COMPARISON:    No relevant prior studies available.    FINDINGS:    Bones joints:  Advanced degenerative changes throughout the entire   ankle without evidence of erosive or destructive change.  No fractures or   dislocations identified diffuse soft tissue swelling about the ankle.    Fusion of the distal tibia and fibula.    Soft tissues:  See above.    IMPRESSION:         No acute findings in the right ankle.    Extensive degenerative changes described above       Impression:          Electronically signed by Jono Cifuentes MD on 02-10-24 at 0706    XR Chest 1 View [[id number]] Collected: 02/10/24 0658     Updated: 02/10/24 0658    Narrative:        Patient: RENATO ORR  Time Out: 06:57  Exam(s): XR CXR 1 VIEW     EXAM:    XR Chest, 1 View    CLINICAL HISTORY:     Reason for exam: volume overload.    TECHNIQUE:    Frontal view of the chest.    COMPARISON:    No relevant prior studies available.    FINDINGS:    Lungs:  Unremarkable.  No consolidation.    Pleural space:  Unremarkable.  No pneumothorax.    Heart:  Cardiomegaly.    Mediastinum:  Unremarkable.  Normal mediastinal contour.    Bones joints:  Unremarkable.  No acute fracture.    IMPRESSION:         No acute findings in the chest.      Impression:          Electronically signed by Jono Cifuentes MD on 02-10-24 at 0657            Results for orders placed during the hospital encounter of 02/10/24    Duplex Venous Lower Extremity - Bilateral CAR    Interpretation Summary    Normal bilateral lower extremity venous duplex scan.      Results for orders placed during the hospital encounter of 02/10/24    Duplex Venous Lower Extremity - Bilateral CAR    Interpretation Summary    Normal bilateral lower extremity venous duplex scan.      Results for orders placed during the hospital encounter of 02/10/24    Adult Transthoracic Echo Complete W/ Cont if Necessary Per Protocol    Interpretation Summary    Systolic and diastolic flattening of the interventricular septum consistent with right ventricle pressure and volume overload    The right ventricular cavity is severely dilated. Severely reduced right ventricular systolic function noted.    Left ventricular systolic function is normal. Left ventricular ejection fraction appears to be 61 - 65%.    Left ventricular wall thickness is consistent with borderline concentric hypertrophy.    Left ventricular diastolic function was indeterminate.    Saline test results are  negative.    The right atrial cavity is moderately dilated.    Mild tricuspid valve regurgitation is present    Calculated right ventricular systolic pressure from tricuspid regurgitation is 47 mmHg.    There is no evidence of pericardial effusion.      Plan for Follow-up of Pending Labs/Results: Vascular surgery clinic  Pending Labs       Order Current Status    Anaerobic Culture - Tissue, Leg, Right In process    Hepatic Function Panel In process    Blood Culture - Blood, Arm, Left Preliminary result    Blood Culture - Blood, Arm, Right Preliminary result    Wound Culture - Wound, Ankle, Right Preliminary result          Discharge Details        Discharge Medications        New Medications        Instructions Start Date   acetaminophen 325 MG tablet  Commonly known as: TYLENOL   650 mg, Oral, Every 6 Hours PRN      sulfamethoxazole-trimethoprim 800-160 MG per tablet  Commonly known as: BACTRIM DS,SEPTRA DS   1 tablet, Oral, Every 12 Hours Scheduled             Changes to Medications        Instructions Start Date   furosemide 80 MG tablet  Commonly known as: LASIX  What changed:   medication strength  how much to take  when to take this   80 mg, Oral, Daily   Start Date: February 15, 2024     lisinopril 5 MG tablet  Commonly known as: PRINIVIL,ZESTRIL  What changed:   medication strength  how much to take   5 mg, Oral, Daily   Start Date: February 15, 2024     metoprolol tartrate 50 MG tablet  Commonly known as: LOPRESSOR  What changed: how much to take   25 mg, Oral, 2 Times Daily               No Known Allergies      Discharge Disposition:  Home or Self Care    Diet:  Hospital:  Diet Order   Procedures    Diet: Cardiac Diets; Healthy Heart (2-3 Na+); Texture: Regular Texture (IDDSI 7); Fluid Consistency: Thin (IDDSI 0)       Activity:  Activity Instructions       Activity as Tolerated      Other Activity Instructions      Activity Instructions: You may return to work on 2/26 as discussed                 CODE  STATUS:    Code Status and Medical Interventions:   Ordered at: 02/10/24 0437     Code Status (Patient has no pulse and is not breathing):    CPR (Attempt to Resuscitate)     Medical Interventions (Patient has pulse or is breathing):    Full Support       Future Appointments   Date Time Provider Department Center   4/15/2024  2:20 PM Dennys Moreno MD [facility]           Additional Instructions for the Follow-ups that You Need to Schedule       Discharge Follow-up with PCP   As directed       Currently Documented PCP:    Jessie Almodovar III, NP-C    PCP Phone Number:    [phone]     Follow Up Details: Recommend primary care provider follow-up within 1 week for general hospital follow-up.  Please call today to schedule        Discharge Follow-up with Specified Provider: Follow-up with your gastroenterologist, Dr Rios,  for elevated bilirubin levels; 2 Months   As directed      To: Follow-up with your gastroenterologist, Dr Rios,  for elevated bilirubin levels   Follow Up: 2 Months        Discharge Follow-up with Specified Provider: Cyndie Diaz in 2 weeks. with BMP lab test before the appointment   As directed      To: Cyndie Diaz in 2 weeks. with BMP lab test before the appointment               Follow-up Information       Michele Anaya MD. Go on 2/29/2024.    Specialty: Vascular Surgery  Why: Spoke to Willow at the office. Appointment set for 12:15 pm on 02/29/2024 for suture removal.   Contact information:  [address]  [facility] [address]  [phone]               Jessie Almodovar III, NP-C .    Specialty: Internal Medicine  Contact information:  [address]  [facility] [id number] [phone]               Jessie Almodovar III, NP-C .    Specialty: Internal Medicine  Why: Recommend primary care provider follow-up within 1 week for general hospital follow-up.  Please call today to schedule  Contact  information:  [address]  [place] [id number] [phone]               Cyndie Diaz APRN Follow up in 2 week(s).    Specialties: Cardiology, Nurse Practitioner  Why: With BMP lab before appointment  Contact information:  [address]  [place] [id number] [phone]               Bong Rios MD Follow up in 3 month(s).    Specialty: Gastroenterology  Contact information:  [address] [facility] [address]  [place] [id number] [phone]                                 Manolo Silvestre MD  02/14/24      Time Spent on Discharge:  I spent greater than 35 minutes on this discharge activity which included: face-to-face encounter with the patient, reviewing the data in the system, coordination of the care with the nursing staff as well as consultants, documentation, and entering orders.

## 2024-02-14 NOTE — CASE MANAGEMENT/SOCIAL WORK
Continued Stay Note  [facility]     Patient Name: Richard Rodrigez  MRN: [id number]  Today's Date: 2/14/2024    Admit Date: 2/10/2024    Plan: Home, family/friend to transport - PT eval pending   Discharge Plan       Row Name 02/14/24 1444       Plan    Plan Comments Pt has appt with [facility] on Friday at 1230.  Pt states he can drive himself. No further needs identified.  JUDY Chacon RN                   Discharge Codes    No documentation.                 Expected Discharge Date and Time       Expected Discharge Date Expected Discharge Time    Feb 14, 2024               Carlie Chacon, RN

## 2024-02-15 ENCOUNTER — TRANSITIONAL CARE MANAGEMENT TELEPHONE ENCOUNTER (OUTPATIENT)
Dept: CALL CENTER | Facility: HOSPITAL | Age: 44
End: 2024-02-15
Payer: COMMERCIAL

## 2024-02-15 LAB
BACTERIA SPEC AEROBE CULT: NORMAL
BACTERIA SPEC AEROBE CULT: NORMAL

## 2024-02-15 NOTE — CASE MANAGEMENT/SOCIAL WORK
Case Management Discharge Note      Final Note: Discharged home with plans to follow up with wound care center         Selected Continued Care - Discharged on 2/14/2024 Admission date: 2/10/2024 - Discharge disposition: Home or Self Care      Destination    No services have been selected for the patient.                Durable Medical Equipment    No services have been selected for the patient.                Dialysis/Infusion    No services have been selected for the patient.                Home Medical Care    No services have been selected for the patient.                Therapy    No services have been selected for the patient.                Community Resources    No services have been selected for the patient.                Community & DME    No services have been selected for the patient.                    Transportation Services  Private: Car    Final Discharge Disposition Code: 01 - home or self-care

## 2024-02-16 ENCOUNTER — TRANSITIONAL CARE MANAGEMENT TELEPHONE ENCOUNTER (OUTPATIENT)
Dept: CALL CENTER | Facility: HOSPITAL | Age: 44
End: 2024-02-16
Payer: COMMERCIAL

## 2024-02-16 ENCOUNTER — OFFICE VISIT (OUTPATIENT)
Dept: WOUND CARE | Facility: HOSPITAL | Age: 44
End: 2024-02-16
Payer: COMMERCIAL

## 2024-02-16 LAB
BACTERIA SPEC AEROBE CULT: ABNORMAL
BACTERIA SPEC AEROBE CULT: ABNORMAL
GRAM STN SPEC: ABNORMAL
GRAM STN SPEC: ABNORMAL

## 2024-02-17 LAB — BACTERIA SPEC ANAEROBE CULT: NORMAL

## 2024-02-19 NOTE — PAYOR COMM NOTE
"Richard Rodrigez (44 y.o. Male)     PLEASE SEE ATTACHED FOR DC NOTICE    REF #[id number]     THANK YOU  MEGA ANDRADE RN/ DEPT  [facility]   [phone]  [phone]        Date of Birth   1980    Social Security Number       Address   [address] [place] [address] [place] [address]    Home Phone   [phone]    MRN   [id number]       Shinto   None    Marital Status                               Admission Date   2/10/24    Admission Type   Emergency    Admitting Provider   Manolo Silvestre MD    Attending Provider       Department, Room/Bed   [facility], E567/1       Discharge Date   2024    Discharge Disposition   Home or Self Care    Discharge Destination                                 Attending Provider: (none)   Allergies: No Known Allergies    Isolation: None   Infection: None   Code Status: Prior    Ht: 182.9 cm (72\")   Wt: 145 kg (320 lb)    Admission Cmt: None   Principal Problem: Anasarca [R60.1]                   Active Insurance as of 2/10/2024       Primary Coverage       Payor Plan Insurance Group Employer/Plan Group    CIG[employer] CIGNA [id number]       Payor Plan Address Payor Plan Phone Number Payor Plan Fax Number Effective Dates    [address] [phone]  2024 - None Entered    [place] [id number]         Subscriber Name Subscriber Birth Date Member ID       RICHARD RODRIGEZ 1980 [id number]                     Emergency Contacts        (Rel.) Home Phone Work Phone Mobile Phone    AIDE KING (Daughter) [phone] -- [phone]              [place]: [facility] [id number]  Tax ID [id number]     Discharge Summary        Manolo Silvestre MD at [facility]              [facility]  DISCHARGE SUMMARY    Patient Name: Richard Rodrigez  : 1980  MRN: [id number]    Date of Admission: 2/10/2024  2:00 AM  Date of Discharge: 2024  Primary Care " Physician: Jessie Almodovar III, NP-C    Consults       Date and Time Order Name Status Description    2/13/2024  6:00 AM Inpatient Cardiology Consult Completed     2/10/2024 12:46 PM Inpatient Vascular Surgery Consult Completed     2/10/2024  4:18 AM [facility] (on-call MD unless specified) Details              Hospital Course       Active Hospital Problems    Diagnosis  POA    **Anasarca [R60.1]  Yes    Proteus infection [A49.8]  Yes    Hyperbilirubinemia [E80.6]  Yes    Acute on chronic heart failure with preserved ejection fraction (HFpEF) [I50.33]  Yes    Acute systolic right heart failure [I50.811]  Yes    Venous stasis ulcer of right ankle [I83.013, L97.319]  Yes    Primary osteoarthritis of right ankle [M19.071]  Yes    Wound infection [T14.8XXA, L08.9]  Yes    Bacterial conjunctivitis [H10.9]  Yes    Prediabetes [R73.03]  Yes    Diastolic CHF, acute [I50.31]  Yes    Abrasion of ankle with infection, right, initial encounter [S90.511A, L08.9]  Yes    Cellulitis of right ankle [L03.115]  Yes    Open wound [T14.8XXA]  Yes    Pulmonary hypertension [I27.20]  Yes    Suspected sleep apnea [R29.818]  Yes    Obesity hypoventilation syndrome [E66.2]  Yes    Morbid (severe) obesity [E66.01]  Yes    Tobacco use disorder [F17.200]  Yes    Essential hypertension [I10]  Yes      Resolved Hospital Problems    Diagnosis Date Resolved POA    DIEGO (acute kidney injury) [N17.9] 02/14/2024 Yes          Hospital Course:  Richard Rodrigez is a 44 y.o. male presents to the hospital with complicated right purulent recurrent ankle wound with acute diastolic CHF/anasarca.     Discussion/plan for today:  Potassium is somewhat elevated but lab appears to have hemolyzed the sample.  Plan to give diuresis twice daily today and recheck potassium tomorrow.  Continue careful telemetry monitoring.  No new issues reported.  Case discussed with microbiology lab.  Ankle infection seems to be polymicrobial.  He did grow some Proteus  however surgical culture is growing gram-positive.  Clinically the drainage looked more consistent with a gram-positive infection possible Streptococcus or Staphylococcus.  Continue vancomycin and ceftriaxone for today.  Vancomycin level returned 13.  Continue local wound care and will follow-up on surgery recommendations.  Cardiology consulted due to echocardiogram findings of severe reduced right systolic function which is probably from cor pulmonale from untreated sleep apnea.  Patient previously was intolerant to CPAP but agrees to seek outpatient sleep study in the near future.  Decrease lisinopril further.     Ankle wound/complicated ankle ulcer:  Appreciate vascular surgery.  Further vascular workup.  MRI when possible.  Status post surgical debridement 2/12/2024.  Wound culture grew Proteus which seems to be the predominant infecting organism.  Patient was treated with ceftriaxone and appears stable to discharge on oral Bactrim per susceptibilities.  Patient has had trouble with noncompliance in the past and I have strongly recommended he make sure he complete all the antibiotic pills.  Case management is arranging for further dressing changes and outpatient [facility] wound care clinic.  Patient will also follow-up in vascular surgery clinic for suture removal.     CHF/anasarca:  Patient has diastolic heart failure and right systolic heart failure with cor pulmonale.  Cardiology evaluated and feel that the right heart failure is likely due to untreated obstructive sleep apnea.  They are going to work on getting him to a sleep study clinic.  Patient agrees and understands it is important he get a sleep study.  In the past he was not able to tolerate CPAP but agrees to try again and discuss other potential treatment options.  His profuse pitting edema greatly improved with IV diuresis for several days.  Cardiology is recommending Lasix 80 mg daily at discharge.  They are setting up appointment with BMP in 1  week.  Patient did have IDEGO at time of admission with initial creatinine of 1.28.  Etiology was likely cardiorenal.  His baseline creatinine is approximately 0.9.  Also of note vascular surgery followed for his anasarca INR following up with further management for venous insufficiency on an outpatient basis.       Prediabetes: Heart healthy diet.  Recommend lifestyle changes.     Morbid obesity: Recommend lifestyle changes, improve diet and exercise and gradual healthy weight loss.     Obesity hypoventilation syndrome and suspected obstructive sleep apnea: Supplemental oxygen as needed to maintain greater than 89%.  Avoid hyperoxia.  Mild borderline hypoxia noted at admission.  As per above patient needs outpatient sleep study which cardiology reports they are working on.  Alternatively he could receive a referral from his primary care provider.     Essential hypertension: Monitor blood pressure and adjust medications as needed.  Decrease lisinopril with diuresis.  Currently normotensive.    At the time of discharge patient was told to take all medications as prescribed, keep all follow-up appointments, and call their doctor or return to the hospital with any worsening or concerning symptoms.    Please note that this note was made using Dragon voice recognition software               Day of Discharge     Subjective: Patient states he is doing well.  He wants to discharge home.  He agrees to follow-up in wound care clinic for his next dressing changes as instructed.  He agrees to follow-up with primary care provider, vascular surgery, cardiology, and gastroenterology.  He understands he is going to have to work with his outpatient team to continue to manage his chronic medical issues.    Vital Signs:   Temp:  [97.5 °F (36.4 °C)-98.4 °F (36.9 °C)] 97.5 °F (36.4 °C)  Heart Rate:  [66-99] 69  Resp:  [16] 16  BP: ()/(46-65) 124/65     Physical Exam:    Constitutional: Awake, alert  Eyes: PERRLA, sclerae anicteric,  "no conjunctival injection  HENT: NCAT, mucous membranes moist  Neck: Supple, no thyromegaly, no lymphadenopathy, trachea midline  Respiratory: No cough or wheezes, normal inspiration, nonlabored respirations   Cardiovascular: Pulse rate is now normal, palpable radial pulses bilaterally  Gastrointestinal: Morbidly obese, soft, nontender, nondistended  Musculoskeletal: Severely obese, BMI is 43, much improved bilateral lower extremity edema  Psychiatric: Appropriate affect, cooperative, conversational  Neurologic: No slurred speech or facial droop, follows commands  Skin: Chronic venous stasis changes noted, right ankle ulcer currently with dressing placed yesterday per wound care with no drainage,  No rashes or jaundice, warm       Pertinent  and/or Most Recent Results     Results from last 7 days   Lab Units 02/14/24 0422 02/13/24 0352 02/12/24 0428 02/11/24  0805 02/11/24  0601 02/10/24  0711 02/10/24  0311   WBC 10*3/mm3 5.88 7.14 5.33  --  6.09 6.50 6.08   HEMOGLOBIN g/dL 15.8 16.2 16.1  --  16.7 16.5 16.8   HEMATOCRIT % 53.1* 54.6* 54.4*  --  55.1* 55.4* 56.6*   PLATELETS 10*3/mm3 154 163 174  --  161 202 194   SODIUM mmol/L 138 135* 137 141  --  139 138   POTASSIUM mmol/L 4.9 5.6* 4.8 4.5  --  5.0 5.0   CHLORIDE mmol/L 95* 94* 94* 95*  --  98 96*   CO2 mmol/L 36.6* 31.0* 34.6* 36.7*  --  30.5* 32.8*   BUN mg/dL 30* 28* 23* 27*  --  39* 40*   CREATININE mg/dL 0.95 0.98 0.92 1.00  --  1.22 1.28*   GLUCOSE mg/dL 86 104* 93 86  --  76 65   CALCIUM mg/dL 8.6 8.5* 8.2* 8.4*  --  9.0 9.3     Results from last 7 days   Lab Units 02/14/24  0422 02/13/24  0352 02/10/24  0311   BILIRUBIN mg/dL  --  3.0* 5.5*  5.4*   ALK PHOS U/L  --  135* 142*  145*   ALT (SGPT) U/L  --  37 35  35   AST (SGOT) U/L  --  68* 68*  68*   PROTIME Seconds 14.4*  --   --    INR  1.11*  --   --            Invalid input(s): \"TG\", \"LDLCALC\", \"LDLREALC\"  Results from last 7 days   Lab Units 02/10/24  0711 02/10/24  0311   TSH uIU/mL  --  " 4.040   HEMOGLOBIN A1C % 6.30*  --    PROBNP pg/mL  --  3,398.0*   HSTROP T ng/L 76* 88*   PROCALCITONIN ng/mL  --  0.37*   LACTATE mmol/L 1.7 2.2*       Microbiology Results Abnormal       Procedure Component Value - Date/Time    Blood Culture - Blood, Arm, Left [[id number]]  (Normal) Collected: 02/10/24 0859    Lab Status: Preliminary result Specimen: Blood from Arm, Left Updated: 02/14/24 0915     Blood Culture No growth at 4 days    Blood Culture - Blood, Arm, Right [[id number]]  (Normal) Collected: 02/10/24 0711    Lab Status: Preliminary result Specimen: Blood from Arm, Right Updated: 02/14/24 0730     Blood Culture No growth at 4 days            Imaging Results (All)       Procedure Component Value Units Date/Time    CT Angiogram Chest [[id number]] Collected: 02/14/24 0911     Updated: 02/14/24 1003    Narrative:      CT ANGIOGRAM CHEST     HISTORY: Shortness of breath, evaluate for pulmonary embolism.     TECHNIQUE: CT angiogram of the chest was performed with 95 mL Isovue-370  IV contrast. Multiplanar and 3-dimensional reformatted images were  produced at a separate workstation. Correlation with CT angiogram chest  12/12/2021.     FINDINGS: There is prominent diffuse third spacing. The cardiac chambers  are dilated, right more than left. The central pulmonary arteries are  dilated. The main pulmonary artery measures 44 mm diameter on image 54  compared with ascending aorta at the same level 32 mm. No pulmonary  embolism is present, however. The thoracic aorta is normal in caliber  and enhances normally.     No pleural or pericardial effusion. Sub-6 mm left lower lobe nodule is  unchanged compared with the CT from 12/2021, consistent with  postinflammatory benign nodule. No infiltrate or airway lesion.     The bones appear normal. No lesion identified in the visualized upper  abdominal organs.       Impression:      Diffuse third spacing with more prominent dilatation of the  central pulmonary arteries than was  seen on the previous exam. New  dilatation of the right atrium and right ventricle. No pulmonary  embolism.        Radiation dose reduction techniques were utilized, including automated  exposure control and exposure modulation based on body size.        This report was finalized on 2/14/2024 10:00 AM by Dr. Tyler Wang M.D on Workstation: [id number]       MRI Ankle Right With & Without Contrast [[id number]] Collected: 02/12/24 1851     Updated: 02/12/24 1927    Narrative:      MRI right ankle and hindfoot with and without contrast     HISTORY: Persistent nonhealing wound at the ankle. The wound has  reportedly been present since 2019 when the patient sustained a  fracture.     TECHNIQUE: MRI of the right ankle and hindfoot was performed before and  after the IV administration of 20 mL of MultiHance IV contrast.  Correlation with x-rays 02/10/2023.      FINDINGS: There is osseous ankylosis between the distal tibia and fibula  and evidence of previously removed fracture repair hardware. Advanced  arthritic change is observed at the tibiotalar joint and there are small  subcortical cysts at the tibial plafond and minimal subcortical marrow  signal change. There is a normal, small volume of fluid at the  tibiotalar joint with expected mild synovial enhancement, typical given  the degree of arthropathy. There is also bulky marginal osteophyte along  the dorsum of the talonavicular and navicular cuneiform joints.     Marrow signal throughout the ankle, hindfoot, and midfoot is otherwise  normal. There is no evidence of osteomyelitis or abscess.     Prominent subcutaneous veins are observed around the foot, ankle, and  the distal lower leg. There is a skin contour abnormality with  exaggerated concavity along the lateral side of the ankle superficial to  the fibula. The axial T1 sequence shows an approximately 12 mm wide area  of low signal tissue extending between the base of the contour  abnormality and the level  of the cortex of the distal fibula. The tissue  in this area minimally enhances. This presumably represents some  fibrotic or granulation tissue. There is no abscess. The adjacent  fibular marrow appears normal.     The flexor and extensor tendons are intact. There is no abnormal tendon  sheath effusion.     There is no cyst or mass lesion.       Impression:      Advanced osteoarthritic change at the right tibiotalar joint  and postoperative change from previous ankle fracture repair. There is  solid osseous ankylosis between the distal tibia and fibula. Some  contour deformity of the skin over the lateral side of the ankle is  observed and there appears to be some fibrotic tissue deep to this area.  However, no abscess, osteomyelitis, or other MRI finding of deep  infection is present.     This report was finalized on 2/12/2024 7:24 PM by Dr. Tyler Wang M.D on Workstation: [id number]       XR Ankle 3+ View Right [[id number]] Collected: 02/10/24 0706     Updated: 02/10/24 0706    Narrative:        Patient: RENATO ORR  Time Out: 07:06  Exam(s): XR RIGHT ANKLE     EXAM:    XR Right Ankle Complete, 3 or More Views    CLINICAL HISTORY:     Reason for exam: Open wound.    TECHNIQUE:    Frontal, lateral and oblique views of the right ankle.    COMPARISON:    No relevant prior studies available.    FINDINGS:    Bones joints:  Advanced degenerative changes throughout the entire   ankle without evidence of erosive or destructive change.  No fractures or   dislocations identified diffuse soft tissue swelling about the ankle.    Fusion of the distal tibia and fibula.    Soft tissues:  See above.    IMPRESSION:         No acute findings in the right ankle.    Extensive degenerative changes described above      Impression:          Electronically signed by Jono Cifuentes MD on 02-10-24 at 0706    XR Chest 1 View [[id number]] Collected: 02/10/24 0658     Updated: 02/10/24 0658    Narrative:        Patient: KIRBY  RENATO  Time Out: 06:57  Exam(s): XR CXR 1 VIEW     EXAM:    XR Chest, 1 View    CLINICAL HISTORY:     Reason for exam: volume overload.    TECHNIQUE:    Frontal view of the chest.    COMPARISON:    No relevant prior studies available.    FINDINGS:    Lungs:  Unremarkable.  No consolidation.    Pleural space:  Unremarkable.  No pneumothorax.    Heart:  Cardiomegaly.    Mediastinum:  Unremarkable.  Normal mediastinal contour.    Bones joints:  Unremarkable.  No acute fracture.    IMPRESSION:         No acute findings in the chest.      Impression:          Electronically signed by Jono Cifuentes MD on 02-10-24 at 0657            Results for orders placed during the hospital encounter of 02/10/24    Duplex Venous Lower Extremity - Bilateral CAR    Interpretation Summary    Normal bilateral lower extremity venous duplex scan.      Results for orders placed during the hospital encounter of 02/10/24    Duplex Venous Lower Extremity - Bilateral CAR    Interpretation Summary    Normal bilateral lower extremity venous duplex scan.      Results for orders placed during the hospital encounter of 02/10/24    Adult Transthoracic Echo Complete W/ Cont if Necessary Per Protocol    Interpretation Summary    Systolic and diastolic flattening of the interventricular septum consistent with right ventricle pressure and volume overload    The right ventricular cavity is severely dilated. Severely reduced right ventricular systolic function noted.    Left ventricular systolic function is normal. Left ventricular ejection fraction appears to be 61 - 65%.    Left ventricular wall thickness is consistent with borderline concentric hypertrophy.    Left ventricular diastolic function was indeterminate.    Saline test results are negative.    The right atrial cavity is moderately dilated.    Mild tricuspid valve regurgitation is present    Calculated right ventricular systolic pressure from tricuspid regurgitation is 47 mmHg.    There is  no evidence of pericardial effusion.      Plan for Follow-up of Pending Labs/Results: Vascular surgery clinic  Pending Labs       Order Current Status    Anaerobic Culture - Tissue, Leg, Right In process    Hepatic Function Panel In process    Blood Culture - Blood, Arm, Left Preliminary result    Blood Culture - Blood, Arm, Right Preliminary result    Wound Culture - Wound, Ankle, Right Preliminary result          Discharge Details        Discharge Medications        New Medications        Instructions Start Date   acetaminophen 325 MG tablet  Commonly known as: TYLENOL   650 mg, Oral, Every 6 Hours PRN      sulfamethoxazole-trimethoprim 800-160 MG per tablet  Commonly known as: BACTRIM DS,SEPTRA DS   1 tablet, Oral, Every 12 Hours Scheduled             Changes to Medications        Instructions Start Date   furosemide 80 MG tablet  Commonly known as: LASIX  What changed:   medication strength  how much to take  when to take this   80 mg, Oral, Daily   Start Date: February 15, 2024     lisinopril 5 MG tablet  Commonly known as: PRINIVIL,ZESTRIL  What changed:   medication strength  how much to take   5 mg, Oral, Daily   Start Date: February 15, 2024     metoprolol tartrate 50 MG tablet  Commonly known as: LOPRESSOR  What changed: how much to take   25 mg, Oral, 2 Times Daily               No Known Allergies      Discharge Disposition:  Home or Self Care    Diet:  Hospital:  Diet Order   Procedures    Diet: Cardiac Diets; Healthy Heart (2-3 Na+); Texture: Regular Texture (IDDSI 7); Fluid Consistency: Thin (IDDSI 0)       Activity:  Activity Instructions       Activity as Tolerated      Other Activity Instructions      Activity Instructions: You may return to work on 2/26 as discussed                 CODE STATUS:    Code Status and Medical Interventions:   Ordered at: 02/10/24 0437     Code Status (Patient has no pulse and is not breathing):    CPR (Attempt to Resuscitate)     Medical Interventions (Patient has pulse  or is breathing):    Full Support       Future Appointments   Date Time Provider Department Center   4/15/2024  2:20 PM Dennys Moreno MD [facility]           Additional Instructions for the Follow-ups that You Need to Schedule       Discharge Follow-up with PCP   As directed       Currently Documented PCP:    Jessie Almodovar III, NP-C    PCP Phone Number:    [phone]     Follow Up Details: Recommend primary care provider follow-up within 1 week for general hospital follow-up.  Please call today to schedule        Discharge Follow-up with Specified Provider: Follow-up with your gastroenterologist, Dr Rios,  for elevated bilirubin levels; 2 Months   As directed      To: Follow-up with your gastroenterologist, Dr Rios,  for elevated bilirubin levels   Follow Up: 2 Months        Discharge Follow-up with Specified Provider: Cyndie Diaz in 2 weeks. with BMP lab test before the appointment   As directed      To: Cyndie Diaz in 2 weeks. with BMP lab test before the appointment               Follow-up Information       Michele Anaya MD. Go on 2/29/2024.    Specialty: Vascular Surgery  Why: Spoke to Willow at the office. Appointment set for 12:15 pm on 02/29/2024 for suture removal.   Contact information:  [email or web address]  [facility] [id number] [phone]               Jessie Almodovar III, NP-C .    Specialty: Internal Medicine  Contact information:  [address] [facility] [address]  [facility] [address]  [phone]               Jessie Almodovar III, NP-C .    Specialty: Internal Medicine  Why: Recommend primary care provider follow-up within 1 week for general hospital follow-up.  Please call today to schedule  Contact information:  [email or web address]  [facility] [address]  [phone]               Cyndie Daiz APRN Follow up in 2 week(s).    Specialties: Cardiology, Nurse Practitioner  Why: With BMP lab before appointment  Contact  information:  [address]  [facility] [id number] [phone]               Bong Rios MD Follow up in 3 month(s).    Specialty: Gastroenterology  Contact information:  [address]  [facility] [id number] [phone]                                 Manolo Silvestre MD  02/14/24      Time Spent on Discharge:  I spent greater than 35 minutes on this discharge activity which included: face-to-face encounter with the patient, reviewing the data in the system, coordination of the care with the nursing staff as well as consultants, documentation, and entering orders.        Electronically signed by Manolo Silvestre MD at 02/14/24 1107       Discharge Order (From admission, onward)       Start     Ordered    02/14/24 0848  Discharge patient  Once        Expected Discharge Date: 02/14/24   Discharge Disposition: Home or Self Care   Physician of Record for Attribution - Please select from Treatment Team: MANOLO SILVESTRE [[id number]]   Review needed by CMO to determine Physician of Record: No      Question Answer Comment   Physician of Record for Attribution - Please select from Treatment Team MANOLO SILVESTRE    Review needed by CMO to determine Physician of Record No        02/14/24 0886

## 2024-02-23 ENCOUNTER — OFFICE VISIT (OUTPATIENT)
Dept: WOUND CARE | Facility: HOSPITAL | Age: 44
End: 2024-02-23
Payer: COMMERCIAL

## 2024-02-23 PROCEDURE — G0463 HOSPITAL OUTPT CLINIC VISIT: HCPCS

## 2024-03-01 ENCOUNTER — OFFICE VISIT (OUTPATIENT)
Dept: WOUND CARE | Facility: HOSPITAL | Age: 44
End: 2024-03-01
Payer: COMMERCIAL

## 2024-03-07 DIAGNOSIS — I83.813 VARICOSE VEINS OF BOTH LOWER EXTREMITIES WITH PAIN: Primary | ICD-10-CM

## 2024-03-18 RX ORDER — FUROSEMIDE 80 MG
80 TABLET ORAL DAILY
Qty: 30 TABLET | Refills: 0 | OUTPATIENT
Start: 2024-03-18

## 2024-03-18 NOTE — TELEPHONE ENCOUNTER
Incoming Refill Request      Medication requested (name and dose):   furosemide (LASIX) 80 MG tablet  80 mg, Daily       Pharmacy where request should be sent: [facility]/pharmacy #[id number] - [place], [place] - [address] [place] [address] - [phone]  - [phone]  [phone]     Additional details provided by patient: NONE    Best call back number: [phone]    Does the patient have less than a 3 day supply:  [x] Yes  [] No    Marcos Betancourt Rep  03/18/24, 09:22 EDT

## 2024-03-20 ENCOUNTER — LAB REQUISITION (OUTPATIENT)
Dept: LAB | Facility: HOSPITAL | Age: 44
End: 2024-03-20
Payer: COMMERCIAL

## 2024-03-20 ENCOUNTER — OFFICE VISIT (OUTPATIENT)
Dept: WOUND CARE | Facility: HOSPITAL | Age: 44
End: 2024-03-20
Payer: COMMERCIAL

## 2024-03-20 ENCOUNTER — HOSPITAL ENCOUNTER (OUTPATIENT)
Dept: GENERAL RADIOLOGY | Facility: HOSPITAL | Age: 44
Discharge: HOME | End: 2024-03-20
Payer: COMMERCIAL

## 2024-03-20 DIAGNOSIS — T81.32XD DISRUPTION OF INTERNAL OPERATION (SURGICAL) WOUND, NOT ELSEWHERE CLASSIFIED, SUBSEQUENT ENCOUNTER: ICD-10-CM

## 2024-03-20 PROCEDURE — 87077 CULTURE AEROBIC IDENTIFY: CPT | Performed by: NURSE PRACTITIONER

## 2024-03-20 PROCEDURE — 87070 CULTURE OTHR SPECIMN AEROBIC: CPT | Performed by: NURSE PRACTITIONER

## 2024-03-20 PROCEDURE — 87186 SC STD MICRODIL/AGAR DIL: CPT | Performed by: NURSE PRACTITIONER

## 2024-03-20 PROCEDURE — 87075 CULTR BACTERIA EXCEPT BLOOD: CPT | Performed by: NURSE PRACTITIONER

## 2024-03-20 PROCEDURE — 87205 SMEAR GRAM STAIN: CPT | Performed by: NURSE PRACTITIONER

## 2024-03-20 PROCEDURE — 73590 X-RAY EXAM OF LOWER LEG: CPT

## 2024-03-22 LAB
BACTERIA SPEC AEROBE CULT: ABNORMAL
GRAM STN SPEC: ABNORMAL

## 2024-03-24 ENCOUNTER — HOSPITAL ENCOUNTER (EMERGENCY)
Facility: HOSPITAL | Age: 44
Discharge: HOME | End: 2024-03-24
Attending: EMERGENCY MEDICINE | Admitting: EMERGENCY MEDICINE
Payer: COMMERCIAL

## 2024-03-24 VITALS
HEART RATE: 100 BPM | RESPIRATION RATE: 16 BRPM | TEMPERATURE: 97.8 F | OXYGEN SATURATION: 100 % | DIASTOLIC BLOOD PRESSURE: 104 MMHG | SYSTOLIC BLOOD PRESSURE: 167 MMHG

## 2024-03-24 DIAGNOSIS — T14.8XXA BLEEDING FROM WOUND: Primary | ICD-10-CM

## 2024-03-24 PROCEDURE — 99283 EMERGENCY DEPT VISIT LOW MDM: CPT

## 2024-03-24 NOTE — ED PROVIDER NOTES
EMERGENCY DEPARTMENT ENCOUNTER    Room Number:  32/32  PCP: Jessie Almodovar III [id number]  Independent Historians: Patient    HPI:  Chief Complaint: had concerns including Wound Check.      A complete HPI/ROS/PMH/PSH/SH/FH are unobtainable due to: None    Chronic or social conditions impacting patient care (Social Determinants of Health): None      Context: Richard Rodrigez is a 44 y.o. male with a medical history of diabetes and hypertension who presents to the ED c/o acute wound on right foot that was bleeding just prior to arrival.  Patient has a prolonged wound for over a month that has been going to wound care for.  Patient has been doing packing himself and keeping a close eye on that wound.  Patient denies any fevers, shortness of breath, calf swelling or tenderness to palpation.  Patient says the wound was bleeding so briskly that he was concerned a superficial vessel had ruptured.  He has had that happen before with significant bleeding at the scene.  Patient had hit his foot and incision site against the frame of the door which exacerbated his pain.  He was also mildly hypoglycemic upon arrival.  Will continue to monitor        Review of prior external notes (non-ED) -and- Review of prior external test results outside of this encounter: I reviewed patient's last admission which was February 14, 2024.  Patient was admitted for acute respiratory failure with prior admission for anasarca.    Prescription drug monitoring program review:         PAST MEDICAL HISTORY  Active Ambulatory Problems     Diagnosis Date Noted    Anasarca 12/07/2021    Morbid (severe) obesity 12/07/2021    Tobacco use disorder 12/07/2021    Essential hypertension 12/07/2021    Hepatic steatosis 12/08/2021    Suspected sleep apnea 12/10/2021    Obesity hypoventilation syndrome 12/10/2021    Pulmonary hypertension 12/11/2021    Liver mass 01/10/2022    Hepatic hemangioma 02/10/2022    Wound infection 02/10/2024    Bacterial  conjunctivitis 02/10/2024    Prediabetes 02/10/2024    Diastolic CHF, acute 02/10/2024    Abrasion of ankle with infection, right, initial encounter 02/10/2024    Cellulitis of right ankle 02/10/2024    Open wound 02/10/2024    Venous stasis ulcer of right ankle 02/12/2024    Primary osteoarthritis of right ankle 02/12/2024    Proteus infection 02/14/2024    Hyperbilirubinemia 02/14/2024    Acute on chronic heart failure with preserved ejection fraction (HFpEF) 02/14/2024    Acute systolic right heart failure 02/14/2024     Resolved Ambulatory Problems     Diagnosis Date Noted    Acute urinary retention 12/07/2021    Acute respiratory failure with hypoxia and hypercapnia 12/10/2021    DIEGO (acute kidney injury) 02/14/2024     Past Medical History:   Diagnosis Date    Hypertension          PAST SURGICAL HISTORY  Past Surgical History:   Procedure Laterality Date    FRACTURE SURGERY      INCISION AND DRAINAGE LEG Right 2/12/2024    Procedure: RIGHT LEG ambulatory phlebectomy with LIGATION OF BLEEDING SITE and wound debridement;  Surgeon: Shahzad Ramos Jr., MD;  Location: [facility];  Service: Vascular;  Laterality: Right;         FAMILY HISTORY  Family History   Problem Relation Age of Onset    Stroke Mother     Hypertension Mother     No Known Problems Sister     No Known Problems Brother     No Known Problems Sister     No Known Problems Sister     No Known Problems Sister     Breast cancer Maternal Grandmother     Colon cancer Neg Hx     Prostate cancer Neg Hx          SOCIAL HISTORY  Social History     Socioeconomic History    Marital status:    Tobacco Use    Smoking status: Every Day     Types: Cigars    Smokeless tobacco: Never   Vaping Use    Vaping status: Never Used   Substance and Sexual Activity    Alcohol use: Yes     Alcohol/week: 2.0 standard drinks of alcohol     Types: 2 Shots of liquor per week     Comment: occ, excessive per ER note    Drug use: Never    Sexual activity: Defer          ALLERGIES  Patient has no known allergies.        REVIEW OF SYSTEMS  Review of Systems  Included in HPI  All systems reviewed and negative except for those discussed in HPI.      PHYSICAL EXAM    I have reviewed the triage vital signs and nursing notes.    ED Triage Vitals [03/24/24 1623]   Temp Heart Rate Resp BP SpO2   97.8 °F (36.6 °C) 100 16 -- 100 %      Temp src Heart Rate Source Patient Position BP Location FiO2 (%)   Tympanic Monitor -- -- --       Physical Exam  GENERAL: alert, no acute distress cooperative and conversant male  SKIN: Warm, dry  HENT: Normocephalic, atraumatic  EYES: no scleral icterus  CV: regular rhythm, regular rate  RESPIRATORY: normal effort, lungs clear  ABDOMEN: soft, nontender, nondistended  MUSCULOSKELETAL: no deformity  NEURO: alert, moves all extremities, follows commands      NIH:                                                             LAB RESULTS  No results found for this or any previous visit (from the past 24 hour(s)).      RADIOLOGY  No Radiology Exams Resulted Within Past 24 Hours      MEDICATIONS GIVEN IN ER  Medications - No data to display      ORDERS PLACED DURING THIS VISIT:  No orders of the defined types were placed in this encounter.        OUTPATIENT MEDICATION MANAGEMENT:  No current Epic-ordered facility-administered medications on file.     Current Outpatient Medications Ordered in Epic   Medication Sig Dispense Refill    acetaminophen (TYLENOL) 325 MG tablet Take 2 tablets by mouth Every 6 (Six) Hours As Needed for Mild Pain.      furosemide (LASIX) 80 MG tablet Take 1 tablet by mouth Daily. 30 tablet 0    lisinopril (PRINIVIL,ZESTRIL) 5 MG tablet Take 1 tablet by mouth Daily. 30 tablet 0    metoprolol tartrate (LOPRESSOR) 50 MG tablet Take 0.5 tablets by mouth 2 (Two) Times a Day.           PROCEDURES  Procedures            PROGRESS, DATA ANALYSIS, CONSULTS, AND MEDICAL DECISION MAKING  All labs have been independently interpreted by me.  All  radiology studies have been reviewed by me. All EKG's have been independently viewed and interpreted by me.  Discussion below represents my analysis of pertinent findings related to patient's condition, differential diagnosis, treatment plan and final disposition.    Differential diagnosis includes but is not limited to .    Clinical Scores:                             AS OF 23:07 EDT VITALS:    BP - (!) 167/104  HR - 100  TEMP - 97.8 °F (36.6 °C) (Tympanic)  O2 SATS - 100%      COMPLEXITY OF CARE  The patient requires admission.    DIAGNOSIS  Final diagnoses:   Bleeding from wound         DISPOSITION  ED Disposition       ED Disposition   Discharge    Condition   Stable    Comment   --                Please note that portions of this document were completed with a voice recognition program.    Note Disclaimer: At [facility], we believe that sharing information builds trust and better relationships. You are receiving this note because you recently visited [facility]. It is possible you will see health information before a provider has talked with you about it. This kind of information can be easy to misunderstand. To help you fully understand what it means for your health, we urge you to discuss this note with your provider.         Deonna Lord MD  03/25/24 1152

## 2024-03-24 NOTE — DISCHARGE INSTRUCTIONS
Rest at home avoiding any particularly strenuous activity today. Call wound care in the morning to discuss your ER visit.      Eat small, frequent meals and drink plenty of fluids. Take any medication prescribed as instructed including the full course of antibiotics already prescribed to you.    Monitor for any signs of recurrent symptoms or worsening and see your primary doctor to discuss your ER visit while returning to the ER if any concerns as we discussed.

## 2024-03-24 NOTE — ED NOTES
Wound to lateral right ankle cleansed with Hibiclens and NS patted dry.  Adaptic dressing applied and covered with telfa.  Kerlix applied and secured with Ace wrap.  Tolerated well.

## 2024-03-24 NOTE — ED NOTES
Pt has had wound on right ankle and has been to wound care.  Today it started bleeding after he hit it with a door

## 2024-03-25 LAB — BACTERIA SPEC ANAEROBE CULT: NORMAL

## 2024-03-27 ENCOUNTER — OFFICE VISIT (OUTPATIENT)
Dept: WOUND CARE | Facility: HOSPITAL | Age: 44
End: 2024-03-27
Payer: COMMERCIAL

## 2024-03-29 ENCOUNTER — OFFICE VISIT (OUTPATIENT)
Dept: WOUND CARE | Facility: HOSPITAL | Age: 44
End: 2024-03-29
Payer: COMMERCIAL

## 2024-04-01 ENCOUNTER — OFFICE VISIT (OUTPATIENT)
Dept: WOUND CARE | Facility: HOSPITAL | Age: 44
End: 2024-04-01
Payer: COMMERCIAL

## 2024-04-03 ENCOUNTER — OFFICE VISIT (OUTPATIENT)
Dept: WOUND CARE | Facility: HOSPITAL | Age: 44
End: 2024-04-03
Payer: COMMERCIAL

## 2024-04-05 ENCOUNTER — OFFICE VISIT (OUTPATIENT)
Dept: WOUND CARE | Facility: HOSPITAL | Age: 44
End: 2024-04-05
Payer: COMMERCIAL

## 2024-04-08 ENCOUNTER — OFFICE VISIT (OUTPATIENT)
Dept: WOUND CARE | Facility: HOSPITAL | Age: 44
End: 2024-04-08
Payer: COMMERCIAL

## 2024-04-10 ENCOUNTER — OFFICE VISIT (OUTPATIENT)
Dept: WOUND CARE | Facility: HOSPITAL | Age: 44
End: 2024-04-10
Payer: COMMERCIAL

## 2024-04-12 ENCOUNTER — LAB REQUISITION (OUTPATIENT)
Dept: LAB | Facility: HOSPITAL | Age: 44
End: 2024-04-12
Payer: COMMERCIAL

## 2024-04-12 ENCOUNTER — OFFICE VISIT (OUTPATIENT)
Dept: WOUND CARE | Facility: HOSPITAL | Age: 44
End: 2024-04-12
Payer: COMMERCIAL

## 2024-04-12 DIAGNOSIS — T81.32XD DISRUPTION OF INTERNAL OPERATION (SURGICAL) WOUND, NOT ELSEWHERE CLASSIFIED, SUBSEQUENT ENCOUNTER: ICD-10-CM

## 2024-04-12 PROCEDURE — 87077 CULTURE AEROBIC IDENTIFY: CPT | Performed by: NURSE PRACTITIONER

## 2024-04-12 PROCEDURE — 87075 CULTR BACTERIA EXCEPT BLOOD: CPT | Performed by: NURSE PRACTITIONER

## 2024-04-12 PROCEDURE — 87070 CULTURE OTHR SPECIMN AEROBIC: CPT | Performed by: NURSE PRACTITIONER

## 2024-04-12 PROCEDURE — 87186 SC STD MICRODIL/AGAR DIL: CPT | Performed by: NURSE PRACTITIONER

## 2024-04-12 PROCEDURE — 87205 SMEAR GRAM STAIN: CPT | Performed by: NURSE PRACTITIONER

## 2024-04-12 PROCEDURE — 87015 SPECIMEN INFECT AGNT CONCNTJ: CPT | Performed by: NURSE PRACTITIONER

## 2024-04-17 ENCOUNTER — OFFICE VISIT (OUTPATIENT)
Dept: WOUND CARE | Facility: HOSPITAL | Age: 44
End: 2024-04-17
Payer: COMMERCIAL

## 2024-04-17 LAB — BACTERIA SPEC ANAEROBE CULT: NORMAL

## 2024-04-19 ENCOUNTER — OFFICE VISIT (OUTPATIENT)
Dept: WOUND CARE | Facility: HOSPITAL | Age: 44
End: 2024-04-19
Payer: COMMERCIAL

## 2024-04-22 ENCOUNTER — OFFICE VISIT (OUTPATIENT)
Dept: WOUND CARE | Facility: HOSPITAL | Age: 44
End: 2024-04-22
Payer: COMMERCIAL

## 2024-04-22 RX ORDER — LISINOPRIL 40 MG/1
40 TABLET ORAL DAILY
Qty: 90 TABLET | Refills: 3 | OUTPATIENT
Start: 2024-04-22

## 2024-04-22 RX ORDER — METOPROLOL TARTRATE 50 MG/1
50 TABLET, FILM COATED ORAL 2 TIMES DAILY
Qty: 180 TABLET | Refills: 3 | Status: SHIPPED | OUTPATIENT
Start: 2024-04-22

## 2024-04-24 ENCOUNTER — OFFICE VISIT (OUTPATIENT)
Dept: WOUND CARE | Facility: HOSPITAL | Age: 44
End: 2024-04-24
Payer: COMMERCIAL

## 2024-04-29 ENCOUNTER — OFFICE VISIT (OUTPATIENT)
Dept: WOUND CARE | Facility: HOSPITAL | Age: 44
End: 2024-04-29
Payer: COMMERCIAL

## 2024-05-01 ENCOUNTER — OFFICE VISIT (OUTPATIENT)
Dept: WOUND CARE | Facility: HOSPITAL | Age: 44
End: 2024-05-01
Payer: COMMERCIAL

## 2024-05-06 ENCOUNTER — OFFICE VISIT (OUTPATIENT)
Dept: WOUND CARE | Facility: HOSPITAL | Age: 44
End: 2024-05-06
Payer: COMMERCIAL

## 2024-05-10 ENCOUNTER — OFFICE VISIT (OUTPATIENT)
Dept: WOUND CARE | Facility: HOSPITAL | Age: 44
End: 2024-05-10
Payer: COMMERCIAL

## 2024-05-17 ENCOUNTER — OFFICE VISIT (OUTPATIENT)
Dept: WOUND CARE | Facility: HOSPITAL | Age: 44
End: 2024-05-17
Payer: COMMERCIAL

## 2024-05-22 ENCOUNTER — OFFICE VISIT (OUTPATIENT)
Dept: WOUND CARE | Facility: HOSPITAL | Age: 44
End: 2024-05-22
Payer: COMMERCIAL

## 2024-05-29 ENCOUNTER — OFFICE VISIT (OUTPATIENT)
Dept: WOUND CARE | Facility: HOSPITAL | Age: 44
End: 2024-05-29
Payer: COMMERCIAL

## 2024-06-05 ENCOUNTER — APPOINTMENT (OUTPATIENT)
Dept: WOUND CARE | Facility: HOSPITAL | Age: 44
End: 2024-06-05
Payer: COMMERCIAL

## 2024-06-06 RX ORDER — FUROSEMIDE 40 MG/1
40 TABLET ORAL 2 TIMES DAILY
Qty: 180 TABLET | Refills: 3 | OUTPATIENT
Start: 2024-06-06

## 2024-06-06 RX ORDER — LISINOPRIL 40 MG/1
40 TABLET ORAL DAILY
Qty: 90 TABLET | Refills: 3 | OUTPATIENT
Start: 2024-06-06

## 2024-06-11 NOTE — TELEPHONE ENCOUNTER
Please reach out to patient to see if he is still taking this, it appears that it was discontinued in February.

## 2024-06-12 ENCOUNTER — APPOINTMENT (OUTPATIENT)
Dept: WOUND CARE | Facility: HOSPITAL | Age: 44
End: 2024-06-12
Payer: COMMERCIAL

## 2024-06-12 RX ORDER — FUROSEMIDE 40 MG/1
40 TABLET ORAL 2 TIMES DAILY
Qty: 180 TABLET | Refills: 3 | Status: SHIPPED | OUTPATIENT
Start: 2024-06-12

## 2024-06-13 ENCOUNTER — TELEPHONE (OUTPATIENT)
Dept: CARDIOLOGY | Facility: CLINIC | Age: 44
End: 2024-06-13
Payer: COMMERCIAL

## 2024-06-13 NOTE — TELEPHONE ENCOUNTER
Richard Anquain Walton called office regarding his furosemide RX.  Patient stated that he is out of his medication and that [facility] told him they needed something from our office to fill out.    Called [facility] and staff stated medication was not due to be filled until 6/14. Staff stated she would push through medication to be filled today and that it would be ready sometime after 2/3pm.      Returned call to patient and notified him of my conversation with [facility].  Patient verbalized understanding.  Encouraged patient to call office if he continues to have issues getting his medication or for any other questions or concerns.  He stated he will do this.    Thank you,  Arabella DEL VALLE RN  Triage Nurse JONH   13:18 EDT

## 2024-06-18 ENCOUNTER — OFFICE VISIT (OUTPATIENT)
Dept: WOUND CARE | Facility: HOSPITAL | Age: 44
End: 2024-06-18
Payer: COMMERCIAL

## 2024-06-26 ENCOUNTER — OFFICE VISIT (OUTPATIENT)
Dept: WOUND CARE | Facility: HOSPITAL | Age: 44
End: 2024-06-26
Payer: COMMERCIAL

## 2024-07-02 RX ORDER — LISINOPRIL 40 MG/1
40 TABLET ORAL DAILY
Qty: 90 TABLET | Refills: 3 | Status: SHIPPED | OUTPATIENT
Start: 2024-07-02

## 2025-03-31 ENCOUNTER — OFFICE VISIT (OUTPATIENT)
Dept: INTERNAL MEDICINE | Facility: CLINIC | Age: 45
End: 2025-03-31
Payer: COMMERCIAL

## 2025-03-31 VITALS
HEIGHT: 72 IN | BODY MASS INDEX: 37 KG/M2 | HEART RATE: 73 BPM | SYSTOLIC BLOOD PRESSURE: 138 MMHG | WEIGHT: 273.2 LBS | DIASTOLIC BLOOD PRESSURE: 80 MMHG | OXYGEN SATURATION: 97 %

## 2025-03-31 DIAGNOSIS — F17.200 TOBACCO USE DISORDER: Chronic | ICD-10-CM

## 2025-03-31 DIAGNOSIS — R73.03 PREDIABETES: ICD-10-CM

## 2025-03-31 DIAGNOSIS — I10 ESSENTIAL HYPERTENSION: Primary | Chronic | ICD-10-CM

## 2025-03-31 DIAGNOSIS — I50.32 CHRONIC HEART FAILURE WITH PRESERVED EJECTION FRACTION: ICD-10-CM

## 2025-03-31 LAB
ALBUMIN SERPL-MCNC: 4.1 G/DL (ref 3.5–5.2)
ALBUMIN/GLOB SERPL: 1.1 G/DL
ALP SERPL-CCNC: 135 U/L (ref 39–117)
ALT SERPL-CCNC: 36 U/L (ref 1–41)
AST SERPL-CCNC: 39 U/L (ref 1–40)
BILIRUB SERPL-MCNC: 0.5 MG/DL (ref 0–1.2)
BUN SERPL-MCNC: 17 MG/DL (ref 6–20)
BUN/CREAT SERPL: 14.7 (ref 7–25)
CALCIUM SERPL-MCNC: 9.6 MG/DL (ref 8.6–10.5)
CHLORIDE SERPL-SCNC: 100 MMOL/L (ref 98–107)
CO2 SERPL-SCNC: 28.9 MMOL/L (ref 22–29)
CREAT SERPL-MCNC: 1.16 MG/DL (ref 0.76–1.27)
EGFRCR SERPLBLD CKD-EPI 2021: 79.6 ML/MIN/1.73
ERYTHROCYTE [DISTWIDTH] IN BLOOD BY AUTOMATED COUNT: 17.8 % (ref 12.3–15.4)
GLOBULIN SER CALC-MCNC: 3.9 GM/DL
GLUCOSE SERPL-MCNC: 71 MG/DL (ref 65–99)
HBA1C MFR BLD: 5.4 % (ref 4.8–5.6)
HCT VFR BLD AUTO: 52.2 % (ref 37.5–51)
HGB BLD-MCNC: 15.7 G/DL (ref 13–17.7)
MCH RBC QN AUTO: 23.4 PG (ref 26.6–33)
MCHC RBC AUTO-ENTMCNC: 30.1 G/DL (ref 31.5–35.7)
MCV RBC AUTO: 77.9 FL (ref 79–97)
PLATELET # BLD AUTO: 252 10*3/MM3 (ref 140–450)
POTASSIUM SERPL-SCNC: 4.7 MMOL/L (ref 3.5–5.2)
PROT SERPL-MCNC: 8 G/DL (ref 6–8.5)
RBC # BLD AUTO: 6.7 10*6/MM3 (ref 4.14–5.8)
SODIUM SERPL-SCNC: 139 MMOL/L (ref 136–145)
WBC # BLD AUTO: 6.77 10*3/MM3 (ref 3.4–10.8)

## 2025-03-31 NOTE — PROGRESS NOTES
Chief Complaint  Hypertension     Subjective:      History of Present Illness {CC  Problem List  Visit  Diagnosis   Encounters  Notes  Medications  Labs  Result Review Imaging  Media :23}     Richard Rodrigez presents to [facility] for:      Follow up hypertension, HFpEF, pre diabetes, MO, suspected DEJAN, elevated Lft, current smoker.     He has not been seen at our practice since 10/26/22 after establishing 1/10/22.      Hospital admission  Discharge Summary by Manolo Silvestre MD (02/14/2024 10:59 AM)   Discharged on lasix 80 mg (he is taking 40 mg twice a day) , lisinopril 5 mg, lopressor 50 mg twice a day     I don't see that he has had follow up or labs since discharge.     Right foot wound 2024: followed by wound care   States wound is completely healed.     Cardiology had advised sleep study - which he has not done.     He states since last seen, he worked on lifestyle changes.   States has been exercising, working on weight loss, low salt.   States sleeps great.  No daytime sleepiness.   More energy than he ever had.     Wt Readings from Last 3 Encounters:   03/31/25 124 kg (273 lb 3.2 oz)   02/11/24 (!) 145 kg (320 lb)   04/13/23 (!) 165 kg (364 lb 9.6 oz)        Continues to smoke: states smoking only 2 cigars daily.   Declines cessation today - but states is motivated to quit in the future.             I have reviewed patient's medical history, any new submitted information provided by patient or medical assistant and updated medical record.      Objective:      Physical Exam  Constitutional:       Appearance: He is obese.   Cardiovascular:      Rate and Rhythm: Normal rate and regular rhythm.      Pulses: Normal pulses.      Heart sounds: Normal heart sounds.   Pulmonary:      Effort: Pulmonary effort is normal.      Breath sounds: Normal breath sounds.   Abdominal:      General: Bowel sounds are normal.   Musculoskeletal:      Right lower leg:  "No edema.      Left lower leg: No edema.   Neurological:      Mental Status: He is oriented to person, place, and time.        Result Review  Data Reviewed:{ Labs  Result Review  Imaging  Med Tab  Media :23}                Vital Signs:   /80 (BP Location: Left arm, Patient Position: Sitting, Cuff Size: Adult)   Pulse 73   Ht 182.9 cm (72\")   Wt 124 kg (273 lb 3.2 oz)   SpO2 97%   BMI 37.05 kg/m²   Estimated body mass index is 37.05 kg/m² as calculated from the following:    Height as of this encounter: 182.9 cm (72\").    Weight as of this encounter: 124 kg (273 lb 3.2 oz).        Requested Prescriptions      No prescriptions requested or ordered in this encounter       Routine medications provided by this office will also be refilled via pharmacy request.       Current Outpatient Medications:     furosemide (LASIX) 40 MG tablet, TAKE 1 TABLET BY MOUTH TWICE A DAY, Disp: 180 tablet, Rfl: 3    lisinopril (PRINIVIL,ZESTRIL) 5 MG tablet, Take 1 tablet by mouth Daily., Disp: 30 tablet, Rfl: 0    metoprolol tartrate (LOPRESSOR) 50 MG tablet, TAKE 1 TABLET BY MOUTH TWICE A DAY, Disp: 180 tablet, Rfl: 3    acetaminophen (TYLENOL) 325 MG tablet, Take 2 tablets by mouth Every 6 (Six) Hours As Needed for Mild Pain. (Patient not taking: Reported on 3/31/2025), Disp: , Rfl:      Assessment and Plan:      Assessment and Plan {CC Problem List  Visit Diagnosis  ROS  Review (Popup)  Health Maintenance  Quality  BestPractice  Medications  SmartSets  SnapShot Encounters  Media :23}     Diagnoses and all orders for this visit:    1. Essential hypertension (Primary)  Overview:  Current treatment:  Lasix 40 mg twice a day, lisinopril 5 mg, lopressor 50 mg bid.     Assessment & Plan:  Hypertension is improving with treatment.  Continue current treatment regimen.  Dietary sodium restriction.  Weight loss.  Stop smoking.  Continue current medications.  Blood pressure will be reassessed at the next regular " appointment.    Orders:  -     Comprehensive Metabolic Panel  -     CBC (No Diff)    2. Prediabetes  Assessment & Plan:  States he has significantly worked on diet changes  Recheck A1C today     Orders:  -     Hemoglobin A1c    3. Chronic heart failure with preserved ejection fraction  Assessment & Plan:  Euvolemic         4. Tobacco use disorder        Will get lab work today.   He will call cardiology to schedule follow up appointment.            No orders of the defined types were placed in this encounter.            Follow Up {Instructions Charge Capture  Follow-up Communications :23}     Return in about 6 months (around 9/30/2025) for Annual physical.      Patient was given instructions and counseling regarding his condition or for health maintenance advice. Please see specific information pulled into the AVS if appropriate.    Dragon disclaimer:   Much of this encounter note is an electronic transcription/translation of spoken language to printed text. The electronic translation of spoken language may permit erroneous, or at times, nonsensical words or phrases to be inadvertently transcribed; Although I have reviewed the note for such errors, some may still exist.     Additional Patient Counseling:       There are no Patient Instructions on file for this visit.

## 2025-04-01 PROBLEM — L08.9 WOUND INFECTION: Status: RESOLVED | Noted: 2024-02-10 | Resolved: 2025-04-01

## 2025-04-01 PROBLEM — A49.8 PROTEUS INFECTION: Status: RESOLVED | Noted: 2024-02-14 | Resolved: 2025-04-01

## 2025-04-01 PROBLEM — I50.32 CHRONIC HEART FAILURE WITH PRESERVED EJECTION FRACTION: Status: ACTIVE | Noted: 2024-02-14

## 2025-04-01 PROBLEM — T14.8XXA WOUND INFECTION: Status: RESOLVED | Noted: 2024-02-10 | Resolved: 2025-04-01

## 2025-04-01 PROBLEM — T14.8XXA OPEN WOUND: Status: RESOLVED | Noted: 2024-02-10 | Resolved: 2025-04-01

## 2025-05-16 RX ORDER — METOPROLOL TARTRATE 50 MG
50 TABLET ORAL EVERY 12 HOURS SCHEDULED
Qty: 30 TABLET | Refills: 0 | Status: SHIPPED | OUTPATIENT
Start: 2025-05-16

## 2025-05-16 NOTE — TELEPHONE ENCOUNTER
05/16/25  10:27 EDT    Received a refill request for metoprolol.  Patient has not been evaluated in clinic since 2023.  Patient was evaluated during a hospitalization in February 2024.  I have provided a courtesy 2-week supply, patient will need to schedule an appointment to avoid interruption in refills.      QUIQUE Bales  [facility]

## 2025-05-28 RX ORDER — METOPROLOL TARTRATE 50 MG
50 TABLET ORAL EVERY 12 HOURS SCHEDULED
Qty: 30 TABLET | Refills: 0 | OUTPATIENT
Start: 2025-05-28

## 2025-07-10 RX ORDER — METOPROLOL TARTRATE 50 MG
50 TABLET ORAL EVERY 12 HOURS SCHEDULED
Qty: 30 TABLET | Refills: 0 | OUTPATIENT
Start: 2025-07-10

## 2025-07-10 NOTE — TELEPHONE ENCOUNTER
Called and spoke with patient , he states he is currently at work and will call back later to schedule an appointment.

## 2025-07-10 NOTE — TELEPHONE ENCOUNTER
Failed protocol check list   Lov 4/13/23 Currently no future appointment scheduled.   Last labs 3/31/25

## 2025-08-15 RX ORDER — LISINOPRIL 40 MG/1
40 TABLET ORAL DAILY
Qty: 90 TABLET | Refills: 3 | Status: SHIPPED | OUTPATIENT
Start: 2025-08-15

## 2025-08-15 RX ORDER — FUROSEMIDE 40 MG/1
40 TABLET ORAL EVERY 12 HOURS SCHEDULED
Qty: 180 TABLET | Refills: 3 | Status: SHIPPED | OUTPATIENT
Start: 2025-08-15

## (undated) DEVICE — TRY SKINPREP WET PREM SCRB

## (undated) DEVICE — TP SXN YANKR WO/ VNT CLR LF

## (undated) DEVICE — DRSNG SPNG KERLIX SUPR 6X6 3/4IN MD STRL TY/10

## (undated) DEVICE — SUT VIC 2/0 CT1 36IN UD VCP945H

## (undated) DEVICE — BNDG ELAS ELITE V/CLOSE 6IN 5YD LF STRL

## (undated) DEVICE — PAD GRND E/S MEGADYNE MONOPLR 2/PLT W/CORD A/ DISP

## (undated) DEVICE — DRSNG PAD ABD ABS 8X10IN STRL

## (undated) DEVICE — PENCL ES MEGADINE EZ/CLEAN BUTN W/HOLSTR 10FT

## (undated) DEVICE — SPNG LAP 18X18IN LF STRL PK/5

## (undated) DEVICE — PK ORTHO MINOR 40

## (undated) DEVICE — TBG SXN CONN/M 1/4IN 20FT LF STRL

## (undated) DEVICE — GLV SURG SENSICARE PI MIC PF SZ7.5 LF STRL

## (undated) DEVICE — BNDG RL GZ BULKEE2 4.5IN 4.1YD STRL

## (undated) DEVICE — LBL SHET CUST SMOKE 2X2IN YEL EA/PK/200